# Patient Record
Sex: FEMALE | Race: BLACK OR AFRICAN AMERICAN | Employment: FULL TIME | URBAN - METROPOLITAN AREA
[De-identification: names, ages, dates, MRNs, and addresses within clinical notes are randomized per-mention and may not be internally consistent; named-entity substitution may affect disease eponyms.]

---

## 2017-02-10 ENCOUNTER — OFFICE VISIT (OUTPATIENT)
Dept: FAMILY MEDICINE CLINIC | Facility: CLINIC | Age: 42
End: 2017-02-10

## 2017-02-10 ENCOUNTER — LAB ENCOUNTER (OUTPATIENT)
Dept: LAB | Age: 42
End: 2017-02-10
Attending: PHYSICIAN ASSISTANT

## 2017-02-10 VITALS
BODY MASS INDEX: 39.27 KG/M2 | HEIGHT: 64 IN | SYSTOLIC BLOOD PRESSURE: 130 MMHG | WEIGHT: 230 LBS | HEART RATE: 90 BPM | DIASTOLIC BLOOD PRESSURE: 85 MMHG

## 2017-02-10 DIAGNOSIS — Z79.4 TYPE 2 DIABETES MELLITUS WITHOUT COMPLICATION, WITH LONG-TERM CURRENT USE OF INSULIN (HCC): ICD-10-CM

## 2017-02-10 DIAGNOSIS — N91.1 SECONDARY AMENORRHEA: Primary | ICD-10-CM

## 2017-02-10 DIAGNOSIS — E11.9 TYPE 2 DIABETES MELLITUS WITHOUT COMPLICATION, WITH LONG-TERM CURRENT USE OF INSULIN (HCC): ICD-10-CM

## 2017-02-10 DIAGNOSIS — N76.0 ACUTE VAGINITIS: ICD-10-CM

## 2017-02-10 DIAGNOSIS — N91.1 SECONDARY AMENORRHEA: ICD-10-CM

## 2017-02-10 DIAGNOSIS — M79.89 SWELLING OF BOTH HANDS: ICD-10-CM

## 2017-02-10 DIAGNOSIS — M54.50 ACUTE BILATERAL LOW BACK PAIN WITHOUT SCIATICA: ICD-10-CM

## 2017-02-10 LAB
ALBUMIN SERPL BCP-MCNC: 3.7 G/DL (ref 3.5–4.8)
ALBUMIN/GLOB SERPL: 1 {RATIO} (ref 1–2)
ALP SERPL-CCNC: 67 U/L (ref 32–100)
ALT SERPL-CCNC: 27 U/L (ref 14–54)
ANION GAP SERPL CALC-SCNC: 10 MMOL/L (ref 0–18)
APPEARANCE: CLEAR
AST SERPL-CCNC: 22 U/L (ref 15–41)
B-HCG SERPL-ACNC: 0.5 MIU/ML
BASOPHILS # BLD: 0.1 K/UL (ref 0–0.2)
BASOPHILS NFR BLD: 1 %
BILIRUB SERPL-MCNC: 0.5 MG/DL (ref 0.3–1.2)
BUN SERPL-MCNC: 10 MG/DL (ref 8–20)
BUN/CREAT SERPL: 12.3 (ref 10–20)
CALCIUM SERPL-MCNC: 9.3 MG/DL (ref 8.5–10.5)
CHLORIDE SERPL-SCNC: 104 MMOL/L (ref 95–110)
CHOLEST SERPL-MCNC: 178 MG/DL (ref 110–200)
CO2 SERPL-SCNC: 26 MMOL/L (ref 22–32)
CONTROL LINE PRESENT WITH A CLEAR BACKGROUND (YES/NO): YES YES/NO
CREAT SERPL-MCNC: 0.81 MG/DL (ref 0.5–1.5)
CRP SERPL-MCNC: 1.3 MG/DL (ref 0–0.9)
EOSINOPHIL # BLD: 0.2 K/UL (ref 0–0.7)
EOSINOPHIL NFR BLD: 3 %
ERYTHROCYTE [DISTWIDTH] IN BLOOD BY AUTOMATED COUNT: 14.7 % (ref 11–15)
ERYTHROCYTE [SEDIMENTATION RATE] IN BLOOD: 8 MM/HR (ref 0–20)
GLOBULIN PLAS-MCNC: 3.7 G/DL (ref 2.5–3.7)
GLUCOSE SERPL-MCNC: 93 MG/DL (ref 70–99)
HBA1C MFR BLD: 9.5 % (ref 4–6)
HCT VFR BLD AUTO: 44.5 % (ref 35–48)
HDLC SERPL-MCNC: 43 MG/DL
HGB BLD-MCNC: 14.6 G/DL (ref 12–16)
KIT LOT #: NORMAL NUMERIC
LDLC SERPL CALC-MCNC: 121 MG/DL (ref 0–99)
LYMPHOCYTES # BLD: 3 K/UL (ref 1–4)
LYMPHOCYTES NFR BLD: 37 %
MCH RBC QN AUTO: 26.5 PG (ref 27–32)
MCHC RBC AUTO-ENTMCNC: 32.8 G/DL (ref 32–37)
MCV RBC AUTO: 80.8 FL (ref 80–100)
MONOCYTES # BLD: 0.7 K/UL (ref 0–1)
MONOCYTES NFR BLD: 9 %
MULTISTIX LOT#: ABNORMAL NUMERIC
NEUTROPHILS # BLD AUTO: 4.2 K/UL (ref 1.8–7.7)
NEUTROPHILS NFR BLD: 51 %
NONHDLC SERPL-MCNC: 135 MG/DL
OSMOLALITY UR CALC.SUM OF ELEC: 289 MOSM/KG (ref 275–295)
PH, URINE: 6.5 (ref 4.5–8)
PLATELET # BLD AUTO: 237 K/UL (ref 140–400)
PMV BLD AUTO: 9 FL (ref 7.4–10.3)
POTASSIUM SERPL-SCNC: 3.7 MMOL/L (ref 3.3–5.1)
PREGNANCY TEST, URINE: NEGATIVE
PROT SERPL-MCNC: 7.4 G/DL (ref 5.9–8.4)
RBC # BLD AUTO: 5.51 M/UL (ref 3.7–5.4)
RHEUMATOID FACT SER QL: <5 IU/ML
SODIUM SERPL-SCNC: 140 MMOL/L (ref 136–144)
SPECIFIC GRAVITY: 1.01 (ref 1–1.03)
TRIGL SERPL-MCNC: 72 MG/DL (ref 1–149)
TSH SERPL-ACNC: 0.53 UIU/ML (ref 0.34–5.6)
UROBILINOGEN,SEMI-QN: 0.2 MG/DL (ref 0–1.9)
WBC # BLD AUTO: 8.2 K/UL (ref 4–11)

## 2017-02-10 PROCEDURE — 83036 HEMOGLOBIN GLYCOSYLATED A1C: CPT

## 2017-02-10 PROCEDURE — 80061 LIPID PANEL: CPT

## 2017-02-10 PROCEDURE — 85025 COMPLETE CBC W/AUTO DIFF WBC: CPT

## 2017-02-10 PROCEDURE — 86431 RHEUMATOID FACTOR QUANT: CPT

## 2017-02-10 PROCEDURE — 85652 RBC SED RATE AUTOMATED: CPT

## 2017-02-10 PROCEDURE — 86038 ANTINUCLEAR ANTIBODIES: CPT

## 2017-02-10 PROCEDURE — 86140 C-REACTIVE PROTEIN: CPT

## 2017-02-10 PROCEDURE — 36415 COLL VENOUS BLD VENIPUNCTURE: CPT

## 2017-02-10 PROCEDURE — 84702 CHORIONIC GONADOTROPIN TEST: CPT

## 2017-02-10 PROCEDURE — 81002 URINALYSIS NONAUTO W/O SCOPE: CPT | Performed by: PHYSICIAN ASSISTANT

## 2017-02-10 PROCEDURE — 84443 ASSAY THYROID STIM HORMONE: CPT

## 2017-02-10 PROCEDURE — 81025 URINE PREGNANCY TEST: CPT | Performed by: PHYSICIAN ASSISTANT

## 2017-02-10 PROCEDURE — 80053 COMPREHEN METABOLIC PANEL: CPT

## 2017-02-10 PROCEDURE — 86039 ANTINUCLEAR ANTIBODIES (ANA): CPT

## 2017-02-10 PROCEDURE — 99212 OFFICE O/P EST SF 10 MIN: CPT | Performed by: PHYSICIAN ASSISTANT

## 2017-02-10 PROCEDURE — 99214 OFFICE O/P EST MOD 30 MIN: CPT | Performed by: PHYSICIAN ASSISTANT

## 2017-02-10 RX ORDER — FLUCONAZOLE 150 MG/1
150 TABLET ORAL ONCE
Qty: 1 TABLET | Refills: 0 | Status: SHIPPED | OUTPATIENT
Start: 2017-02-10 | End: 2017-02-10

## 2017-02-10 NOTE — PROGRESS NOTES
HPI:    Patient ID: Gracie Sheppard is a 46year old female. HPI Comments: Patient presents for vaginal discharge and itching for past week. She denies urinary frequency or dysuria. She denies pelvic pain.   She has also been having irregular periods by mouth once. Disp: 1 tablet Rfl: 0   Insulin Pen Needle (RELION PEN NEEDLES) 32G X 4 MM Does not apply Misc Use with injections 2 times daily Disp: 200 each Rfl: 3   Insulin Syringe 31G X 5/16\" 0.5 ML Does not apply Misc by Does not apply route.  Disp: hands and feet. No sign of fungal infection. Psychiatric: She has a normal mood and affect. ASSESSMENT/PLAN:   Secondary amenorrhea  (primary encounter diagnosis)  -Urine HCG negative. Serum HCG and TSH ordered.     Acute vaginitis  -Difluc

## 2017-02-12 LAB
C TRACH DNA SPEC QL NAA+PROBE: NEGATIVE
N GONORRHOEA DNA SPEC QL NAA+PROBE: NEGATIVE
TRICHOMONAS SCREEN: NEGATIVE

## 2017-02-15 LAB — ANA NUCLEOLAR TITR SER IF: 40 {TITER}

## 2017-05-17 ENCOUNTER — PATIENT OUTREACH (OUTPATIENT)
Dept: FAMILY MEDICINE CLINIC | Facility: CLINIC | Age: 42
End: 2017-05-17

## 2017-05-17 NOTE — PROGRESS NOTES
NEEDS: HGBA1C, CMP, Lipid  DM REVIEW: HGBA1C: 2/10/2017-9.5, CMP: 2/20/2017, TSH: 2/10/2017, MICROALBUMIN:8/20/2016,  LDL: 2/10/2017-121,  EYE: 9/7/2016, FOOT: 8/22/2016   NOTES: LOV was 2/10/2017.

## 2017-09-09 ENCOUNTER — TELEPHONE (OUTPATIENT)
Dept: OTOLARYNGOLOGY | Facility: CLINIC | Age: 42
End: 2017-09-09

## 2017-09-09 DIAGNOSIS — E04.1 THYROID NODULE: Primary | ICD-10-CM

## 2017-10-13 ENCOUNTER — TELEPHONE (OUTPATIENT)
Dept: OTOLARYNGOLOGY | Facility: CLINIC | Age: 42
End: 2017-10-13

## 2018-03-13 ENCOUNTER — OFFICE VISIT (OUTPATIENT)
Dept: FAMILY MEDICINE CLINIC | Facility: CLINIC | Age: 43
End: 2018-03-13

## 2018-03-13 ENCOUNTER — LAB ENCOUNTER (OUTPATIENT)
Dept: LAB | Age: 43
End: 2018-03-13
Attending: FAMILY MEDICINE
Payer: COMMERCIAL

## 2018-03-13 VITALS
BODY MASS INDEX: 42.34 KG/M2 | WEIGHT: 248 LBS | HEIGHT: 64 IN | SYSTOLIC BLOOD PRESSURE: 122 MMHG | DIASTOLIC BLOOD PRESSURE: 87 MMHG | TEMPERATURE: 99 F | HEART RATE: 99 BPM

## 2018-03-13 DIAGNOSIS — E78.00 HYPERCHOLESTEREMIA: ICD-10-CM

## 2018-03-13 DIAGNOSIS — Z12.31 ENCOUNTER FOR SCREENING MAMMOGRAM FOR BREAST CANCER: ICD-10-CM

## 2018-03-13 DIAGNOSIS — Z01.419 ENCOUNTER FOR GYNECOLOGICAL EXAMINATION: ICD-10-CM

## 2018-03-13 DIAGNOSIS — E66.01 MORBID OBESITY WITH BMI OF 40.0-44.9, ADULT (HCC): ICD-10-CM

## 2018-03-13 DIAGNOSIS — Z11.3 SCREENING EXAMINATION FOR STD (SEXUALLY TRANSMITTED DISEASE): ICD-10-CM

## 2018-03-13 DIAGNOSIS — Z00.00 WELL ADULT EXAM: Primary | ICD-10-CM

## 2018-03-13 DIAGNOSIS — IMO0001 UNCONTROLLED TYPE 2 DIABETES MELLITUS WITHOUT COMPLICATION, WITH LONG-TERM CURRENT USE OF INSULIN: ICD-10-CM

## 2018-03-13 DIAGNOSIS — Z00.00 WELL ADULT EXAM: ICD-10-CM

## 2018-03-13 DIAGNOSIS — E04.2 MULTIPLE THYROID NODULES: ICD-10-CM

## 2018-03-13 PROBLEM — N76.0 ACUTE VAGINITIS: Status: RESOLVED | Noted: 2017-02-10 | Resolved: 2018-03-13

## 2018-03-13 PROBLEM — E11.9 TYPE 2 DIABETES MELLITUS WITHOUT COMPLICATION, WITH LONG-TERM CURRENT USE OF INSULIN (HCC): Status: RESOLVED | Noted: 2017-02-10 | Resolved: 2018-03-13

## 2018-03-13 PROBLEM — Z79.4 TYPE 2 DIABETES MELLITUS WITHOUT COMPLICATION, WITH LONG-TERM CURRENT USE OF INSULIN (HCC): Status: RESOLVED | Noted: 2017-02-10 | Resolved: 2018-03-13

## 2018-03-13 PROBLEM — M54.50 ACUTE BILATERAL LOW BACK PAIN WITHOUT SCIATICA: Status: RESOLVED | Noted: 2017-02-10 | Resolved: 2018-03-13

## 2018-03-13 LAB
ALT SERPL-CCNC: 36 U/L (ref 14–54)
ANION GAP SERPL CALC-SCNC: 7 MMOL/L (ref 0–18)
AST SERPL-CCNC: 28 U/L (ref 15–41)
BASOPHILS # BLD: 0 K/UL (ref 0–0.2)
BASOPHILS NFR BLD: 1 %
BUN SERPL-MCNC: 11 MG/DL (ref 8–20)
BUN/CREAT SERPL: 14.7 (ref 10–20)
CALCIUM SERPL-MCNC: 9.2 MG/DL (ref 8.5–10.5)
CHLORIDE SERPL-SCNC: 102 MMOL/L (ref 95–110)
CHOLEST SERPL-MCNC: 188 MG/DL (ref 110–200)
CO2 SERPL-SCNC: 28 MMOL/L (ref 22–32)
CREAT SERPL-MCNC: 0.75 MG/DL (ref 0.5–1.5)
CREAT UR-MCNC: 279.5 MG/DL
EOSINOPHIL # BLD: 0.1 K/UL (ref 0–0.7)
EOSINOPHIL NFR BLD: 2 %
ERYTHROCYTE [DISTWIDTH] IN BLOOD BY AUTOMATED COUNT: 14.5 % (ref 11–15)
GLUCOSE SERPL-MCNC: 238 MG/DL (ref 70–99)
HBA1C MFR BLD: 11 % (ref 4–6)
HCT VFR BLD AUTO: 42.4 % (ref 35–48)
HDLC SERPL-MCNC: 46 MG/DL
HGB BLD-MCNC: 14.5 G/DL (ref 12–16)
LDLC SERPL CALC-MCNC: 128 MG/DL (ref 0–99)
LYMPHOCYTES # BLD: 3.3 K/UL (ref 1–4)
LYMPHOCYTES NFR BLD: 39 %
MCH RBC QN AUTO: 27.2 PG (ref 27–32)
MCHC RBC AUTO-ENTMCNC: 34.1 G/DL (ref 32–37)
MCV RBC AUTO: 79.6 FL (ref 80–100)
MICROALBUMIN UR-MCNC: 5.2 MG/DL (ref 0–1.8)
MICROALBUMIN/CREAT UR: 18.6 MG/G{CREAT} (ref 0–20)
MONOCYTES # BLD: 0.7 K/UL (ref 0–1)
MONOCYTES NFR BLD: 8 %
NEUTROPHILS # BLD AUTO: 4.3 K/UL (ref 1.8–7.7)
NEUTROPHILS NFR BLD: 51 %
NONHDLC SERPL-MCNC: 142 MG/DL
OSMOLALITY UR CALC.SUM OF ELEC: 291 MOSM/KG (ref 275–295)
PLATELET # BLD AUTO: 427 K/UL (ref 140–400)
PMV BLD AUTO: 8.5 FL (ref 7.4–10.3)
POTASSIUM SERPL-SCNC: 3.8 MMOL/L (ref 3.3–5.1)
RBC # BLD AUTO: 5.32 M/UL (ref 3.7–5.4)
SODIUM SERPL-SCNC: 137 MMOL/L (ref 136–144)
TRIGL SERPL-MCNC: 72 MG/DL (ref 1–149)
TSH SERPL-ACNC: 0.89 UIU/ML (ref 0.45–5.33)
WBC # BLD AUTO: 8.4 K/UL (ref 4–11)

## 2018-03-13 PROCEDURE — 80061 LIPID PANEL: CPT

## 2018-03-13 PROCEDURE — 83036 HEMOGLOBIN GLYCOSYLATED A1C: CPT

## 2018-03-13 PROCEDURE — 99396 PREV VISIT EST AGE 40-64: CPT | Performed by: FAMILY MEDICINE

## 2018-03-13 PROCEDURE — 82570 ASSAY OF URINE CREATININE: CPT

## 2018-03-13 PROCEDURE — 80048 BASIC METABOLIC PNL TOTAL CA: CPT

## 2018-03-13 PROCEDURE — 84443 ASSAY THYROID STIM HORMONE: CPT

## 2018-03-13 PROCEDURE — 82306 VITAMIN D 25 HYDROXY: CPT

## 2018-03-13 PROCEDURE — 85025 COMPLETE CBC W/AUTO DIFF WBC: CPT

## 2018-03-13 PROCEDURE — 84450 TRANSFERASE (AST) (SGOT): CPT

## 2018-03-13 PROCEDURE — 82043 UR ALBUMIN QUANTITATIVE: CPT

## 2018-03-13 PROCEDURE — 36415 COLL VENOUS BLD VENIPUNCTURE: CPT

## 2018-03-13 PROCEDURE — 84460 ALANINE AMINO (ALT) (SGPT): CPT

## 2018-03-13 RX ORDER — PENICILLIN V POTASSIUM 500 MG/1
500 TABLET ORAL
Refills: 0 | COMMUNITY
Start: 2018-03-07 | End: 2018-06-08

## 2018-03-13 NOTE — PROGRESS NOTES
HPI:   Salas Rutherford is a 38year old female who presents for a complete physical exam. Symptoms: periods are regular.      Wt Readings from Last 6 Encounters:  03/13/18 : 248 lb (112.5 kg)  02/10/17 : 230 lb (104.3 kg)  09/16/16 : 230 lb (104.3 kg)  09 Insulin Syringe 30G X 5/16\" 1 ML Does not apply Misc Use with insulin 4 times per day Disp: 150 each Rfl: 0      Past Medical History:   Diagnosis Date   • Diabetes mellitus ([facility] no BDR  7/24/2014   • Diabetic retinopathy ([facility] 2008   • Myopia of both denies dysuria, vaginal discharge or itching,periods regular   MUSCULOSKELETAL: denies back pain  NEURO: denies headaches  PSYCHE: denies depression or anxiety  HEMATOLOGIC: denies hx of anemia  ENDOCRINE: denies thyroid history  ALL/ASTHMA: denies hx of a shot  Tetanus booster every 10 years    - ALT (SGPT); Future  - AST (SGOT); Future  - BASIC METABOLIC PANEL (8); Future  - CBC WITH DIFFERENTIAL WITH PLATELET; Future  - LIPID PANEL;  Future  - TSH W REFLEX TO FREE T4; Future  - VITAMIN D, 25-HYDROXY; Futur

## 2018-03-14 LAB
25(OH)D3 SERPL-MCNC: 15.3 NG/ML
C TRACH DNA SPEC QL NAA+PROBE: NEGATIVE
N GONORRHOEA DNA SPEC QL NAA+PROBE: NEGATIVE

## 2018-03-16 ENCOUNTER — PATIENT MESSAGE (OUTPATIENT)
Dept: ENDOCRINOLOGY CLINIC | Facility: CLINIC | Age: 43
End: 2018-03-16

## 2018-03-16 ENCOUNTER — PATIENT MESSAGE (OUTPATIENT)
Dept: FAMILY MEDICINE CLINIC | Facility: CLINIC | Age: 43
End: 2018-03-16

## 2018-03-16 ENCOUNTER — TELEPHONE (OUTPATIENT)
Dept: ENDOCRINOLOGY CLINIC | Facility: CLINIC | Age: 43
End: 2018-03-16

## 2018-03-16 ENCOUNTER — TELEPHONE (OUTPATIENT)
Dept: OTHER | Age: 43
End: 2018-03-16

## 2018-03-16 DIAGNOSIS — N92.6 LATE MENSES: Primary | ICD-10-CM

## 2018-03-16 LAB
HPV I/H RISK 1 DNA SPEC QL NAA+PROBE: NEGATIVE
LAST PAP RESULT: NORMAL

## 2018-03-16 NOTE — TELEPHONE ENCOUNTER
KASIATCDEAN and will also send email. Currently there is cancellation spot at 1:15 on Monday or 3pm MD approval. Will email patient as well and offer either one.

## 2018-03-16 NOTE — TELEPHONE ENCOUNTER
Pt sent message via My Chart scheduling request.  Pt was offered first available appt on 5/4/18 but states:    \"I need an appointment much sooner with Dr Ray Santoyo or the other Endocrinologist in the office my Dr wants me to see someone asap since my diabetes

## 2018-03-16 NOTE — TELEPHONE ENCOUNTER
No recent pregnancy test on file. Message was routed to provider to confirm if order can be placed.     Please reply to pool: JOSE ENRIQUE Connelly

## 2018-03-16 NOTE — TELEPHONE ENCOUNTER
From: Edgardo Rodas  To: Paige Alcantara MD  Sent: 3/16/2018 7:52 AM CDT  Subject: Test Results Question    I was wondering if the pregnancy test was done if so what are the results.     I can't get the mammogram next week without the results of the pregn

## 2018-03-17 NOTE — TELEPHONE ENCOUNTER
Pt informed via [employer] regarding urine pregnancy test order. Pt to call back if se has any questions.

## 2018-03-17 NOTE — TELEPHONE ENCOUNTER
Amie Rodarte MD Physician Signed  Creation Time: 03/17/2018 10:25 AM         No pregnancy test was done. I can order this.   Urine hCG ordered.

## 2018-03-19 ENCOUNTER — TELEPHONE (OUTPATIENT)
Dept: ENDOCRINOLOGY CLINIC | Facility: CLINIC | Age: 43
End: 2018-03-19

## 2018-03-19 ENCOUNTER — TELEPHONE (OUTPATIENT)
Dept: FAMILY MEDICINE CLINIC | Facility: CLINIC | Age: 43
End: 2018-03-19

## 2018-03-19 ENCOUNTER — OFFICE VISIT (OUTPATIENT)
Dept: ENDOCRINOLOGY CLINIC | Facility: CLINIC | Age: 43
End: 2018-03-19

## 2018-03-19 VITALS
SYSTOLIC BLOOD PRESSURE: 137 MMHG | DIASTOLIC BLOOD PRESSURE: 84 MMHG | WEIGHT: 250 LBS | HEART RATE: 97 BPM | HEIGHT: 64 IN | BODY MASS INDEX: 42.68 KG/M2

## 2018-03-19 DIAGNOSIS — E11.65 UNCONTROLLED TYPE 2 DIABETES MELLITUS WITH HYPERGLYCEMIA, WITH LONG-TERM CURRENT USE OF INSULIN (HCC): Primary | ICD-10-CM

## 2018-03-19 DIAGNOSIS — Z79.4 UNCONTROLLED TYPE 2 DIABETES MELLITUS WITH HYPERGLYCEMIA, WITH LONG-TERM CURRENT USE OF INSULIN (HCC): Primary | ICD-10-CM

## 2018-03-19 LAB
GLUCOSE BLOOD: 280
TEST STRIP LOT #: NORMAL NUMERIC

## 2018-03-19 PROCEDURE — 99212 OFFICE O/P EST SF 10 MIN: CPT | Performed by: INTERNAL MEDICINE

## 2018-03-19 PROCEDURE — 82962 GLUCOSE BLOOD TEST: CPT | Performed by: INTERNAL MEDICINE

## 2018-03-19 PROCEDURE — 36416 COLLJ CAPILLARY BLOOD SPEC: CPT | Performed by: INTERNAL MEDICINE

## 2018-03-19 PROCEDURE — 99214 OFFICE O/P EST MOD 30 MIN: CPT | Performed by: INTERNAL MEDICINE

## 2018-03-19 RX ORDER — GLIMEPIRIDE 4 MG/1
4 TABLET ORAL
Qty: 30 TABLET | Refills: 6 | Status: SHIPPED | OUTPATIENT
Start: 2018-03-19 | End: 2018-06-29

## 2018-03-19 RX ORDER — METFORMIN HYDROCHLORIDE 500 MG/1
500 TABLET, EXTENDED RELEASE ORAL 2 TIMES DAILY WITH MEALS
Qty: 60 TABLET | Refills: 6 | Status: SHIPPED | OUTPATIENT
Start: 2018-03-19 | End: 2018-06-08

## 2018-03-19 NOTE — PROGRESS NOTES
Name: Rajni Humphrey  Date: 3/19/2018    Referring Physician: No ref. provider found    HISTORY OF PRESENT ILLNESS   Rajni Humphrey is a 38year old female who presents for diabetes mellitus, diagnosed in 2008.   She has been lost to follow up for ove hours. (Patient taking differently: Inject 42 Units into the skin every 12 (twelve) hours.  Taking 75 units in the morning, 80 units in the evening ), Disp: 30 mL, Rfl: 3  •  Glucose Blood (CLAIRE CONTOUR NEXT TEST) In Vitro Strip, Check 2 times daily, Disp: conjunctivae, sclera. , normal sclera and normal pupils  Ears/Nose/Mouth/Throat/Neck:  no palpable thyroid nodules or cervical lymphadenopathy, diffusely enlarged thyroid  Back: no kyphosis or back tenderness  Respiratory:  clear to auscultation bilaterally

## 2018-03-19 NOTE — TELEPHONE ENCOUNTER
Informed patient that Dr Micheal Ortiz has ordered urine pregnancy test, lab in [facility] is open [id number] M-F, [place] [address]

## 2018-03-19 NOTE — PATIENT INSTRUCTIONS
Start Tresiba 80 units SQ daily    Start Glimepiride 4mg daily    Start Metformin ER 500mg 2 times per day    Start Victoza 0.6mg SQ daily for one week then increase to 1.2mg SQ daily for one week then increase to 1.8mg SQ daily

## 2018-03-20 ENCOUNTER — TELEPHONE (OUTPATIENT)
Dept: OTHER | Age: 43
End: 2018-03-20

## 2018-03-20 ENCOUNTER — APPOINTMENT (OUTPATIENT)
Dept: LAB | Age: 43
End: 2018-03-20
Attending: FAMILY MEDICINE
Payer: COMMERCIAL

## 2018-03-20 DIAGNOSIS — N92.6 LATE MENSES: ICD-10-CM

## 2018-03-20 LAB — B-HCG UR QL: NEGATIVE

## 2018-03-20 PROCEDURE — 81025 URINE PREGNANCY TEST: CPT

## 2018-03-21 ENCOUNTER — HOSPITAL ENCOUNTER (OUTPATIENT)
Dept: ULTRASOUND IMAGING | Age: 43
Discharge: HOME | End: 2018-03-21
Attending: FAMILY MEDICINE
Payer: COMMERCIAL

## 2018-03-21 ENCOUNTER — HOSPITAL ENCOUNTER (OUTPATIENT)
Dept: MAMMOGRAPHY | Age: 43
Discharge: HOME | End: 2018-03-21
Attending: FAMILY MEDICINE
Payer: COMMERCIAL

## 2018-03-21 ENCOUNTER — TELEPHONE (OUTPATIENT)
Dept: FAMILY MEDICINE CLINIC | Facility: CLINIC | Age: 43
End: 2018-03-21

## 2018-03-21 DIAGNOSIS — Z12.31 ENCOUNTER FOR SCREENING MAMMOGRAM FOR BREAST CANCER: ICD-10-CM

## 2018-03-21 DIAGNOSIS — E04.2 MULTIPLE THYROID NODULES: ICD-10-CM

## 2018-03-21 PROCEDURE — 77067 SCR MAMMO BI INCL CAD: CPT | Performed by: FAMILY MEDICINE

## 2018-03-21 PROCEDURE — 76536 US EXAM OF HEAD AND NECK: CPT | Performed by: FAMILY MEDICINE

## 2018-03-22 NOTE — TELEPHONE ENCOUNTER
Victoza approved through 3/20/2019. Approved or up to 9ml/month. Case number [id number]. Pharmacy made aware.

## 2018-03-23 PROBLEM — R92.2 DENSE BREAST TISSUE ON MAMMOGRAM: Status: ACTIVE | Noted: 2018-03-23

## 2018-03-23 PROBLEM — E55.9 VITAMIN D DEFICIENCY: Status: ACTIVE | Noted: 2018-03-23

## 2018-03-23 PROBLEM — R92.30 DENSE BREAST TISSUE ON MAMMOGRAM: Status: ACTIVE | Noted: 2018-03-23

## 2018-04-27 ENCOUNTER — OFFICE VISIT (OUTPATIENT)
Dept: ENDOCRINOLOGY CLINIC | Facility: CLINIC | Age: 43
End: 2018-04-27

## 2018-04-27 VITALS
DIASTOLIC BLOOD PRESSURE: 86 MMHG | WEIGHT: 251 LBS | HEART RATE: 102 BPM | SYSTOLIC BLOOD PRESSURE: 131 MMHG | HEIGHT: 64 IN | BODY MASS INDEX: 42.85 KG/M2

## 2018-04-27 DIAGNOSIS — E11.65 UNCONTROLLED TYPE 2 DIABETES MELLITUS WITH HYPERGLYCEMIA, WITH LONG-TERM CURRENT USE OF INSULIN (HCC): Primary | ICD-10-CM

## 2018-04-27 DIAGNOSIS — Z79.4 UNCONTROLLED TYPE 2 DIABETES MELLITUS WITH HYPERGLYCEMIA, WITH LONG-TERM CURRENT USE OF INSULIN (HCC): Primary | ICD-10-CM

## 2018-04-27 PROCEDURE — 99213 OFFICE O/P EST LOW 20 MIN: CPT | Performed by: INTERNAL MEDICINE

## 2018-04-27 PROCEDURE — 82962 GLUCOSE BLOOD TEST: CPT | Performed by: INTERNAL MEDICINE

## 2018-04-27 PROCEDURE — 99212 OFFICE O/P EST SF 10 MIN: CPT | Performed by: INTERNAL MEDICINE

## 2018-04-27 PROCEDURE — 36416 COLLJ CAPILLARY BLOOD SPEC: CPT | Performed by: INTERNAL MEDICINE

## 2018-04-27 PROCEDURE — 83036 HEMOGLOBIN GLYCOSYLATED A1C: CPT | Performed by: INTERNAL MEDICINE

## 2018-04-27 NOTE — PATIENT INSTRUCTIONS
Stop Metformin     Start Tresiba 80 units SQ bedtime    Start Victoza 0.6mg SQ daily for one week then increase to 1.2mg SQ daily     Continue Glimepiride

## 2018-04-27 NOTE — PROGRESS NOTES
Name: Pat Gutiérrez  Date: 4/27/2018    Referring Physician: No ref. provider found    HISTORY OF PRESENT ILLNESS   Pat Gutiérrez is a 38year old female who presents for diabetes mellitus, diagnosed in 2008.   She has been lost to follow up for ove each, Rfl: 3  •  Insulin Syringe 31G X 5/16\" 0.5 ML Does not apply Misc, by Does not apply route., Disp: , Rfl:   •  Insulin NPH Isophane & Regular (NOVOLIN 70/30 RELION) (70-30) 100 UNIT/ML Subcutaneous Suspension, Inject 42 Units into the skin every 12 Alcohol use: No              Drug use: No            Other Topics            Concern  Caffeine Concern        Yes    Comment:tea; 2 cups a week.         Medical History:   Past Medical History:   Diagnosis Date   • Diabetes mellitus ([facility] no BDR  7/24/2014 transition to basal and GLP-1 therapy   -Start Tresiba 80 units SQ QHS   -Start Victoza 1.8mg SQ daily  -Start Glimepiride 4mg PO daily  -No Metformin therapy given significant GI symptoms  -Provided coupon cards to help with cost and she will call if unab

## 2018-05-24 ENCOUNTER — APPOINTMENT (OUTPATIENT)
Dept: LAB | Age: 43
End: 2018-05-24
Attending: FAMILY MEDICINE
Payer: COMMERCIAL

## 2018-05-24 ENCOUNTER — OFFICE VISIT (OUTPATIENT)
Dept: FAMILY MEDICINE CLINIC | Facility: CLINIC | Age: 43
End: 2018-05-24

## 2018-05-24 VITALS
TEMPERATURE: 98 F | HEIGHT: 64 IN | HEART RATE: 90 BPM | DIASTOLIC BLOOD PRESSURE: 82 MMHG | BODY MASS INDEX: 42.34 KG/M2 | SYSTOLIC BLOOD PRESSURE: 132 MMHG | WEIGHT: 248 LBS

## 2018-05-24 DIAGNOSIS — Z11.1 TUBERCULOSIS SCREENING: ICD-10-CM

## 2018-05-24 DIAGNOSIS — Z01.84 IMMUNITY STATUS TESTING: ICD-10-CM

## 2018-05-24 DIAGNOSIS — Z02.1 DRUG SCREENING, PRE-EMPLOYMENT: ICD-10-CM

## 2018-05-24 DIAGNOSIS — Z02.1 PRE-EMPLOYMENT EXAMINATION: Primary | ICD-10-CM

## 2018-05-24 PROCEDURE — 99212 OFFICE O/P EST SF 10 MIN: CPT | Performed by: FAMILY MEDICINE

## 2018-05-24 PROCEDURE — 86580 TB INTRADERMAL TEST: CPT | Performed by: FAMILY MEDICINE

## 2018-05-24 PROCEDURE — 36415 COLL VENOUS BLD VENIPUNCTURE: CPT

## 2018-05-24 PROCEDURE — 86706 HEP B SURFACE ANTIBODY: CPT

## 2018-05-24 PROCEDURE — 99214 OFFICE O/P EST MOD 30 MIN: CPT | Performed by: FAMILY MEDICINE

## 2018-05-24 PROCEDURE — 86765 RUBEOLA ANTIBODY: CPT

## 2018-05-24 PROCEDURE — 86762 RUBELLA ANTIBODY: CPT

## 2018-05-24 PROCEDURE — 86787 VARICELLA-ZOSTER ANTIBODY: CPT

## 2018-05-24 PROCEDURE — 86735 MUMPS ANTIBODY: CPT

## 2018-05-24 PROCEDURE — 80307 DRUG TEST PRSMV CHEM ANLYZR: CPT

## 2018-05-24 NOTE — PROGRESS NOTES
HPI:   Sin Choi is a 38year old female who presents for a complete preemployment  exam.    Patient has brought in forms that she needs completed. Patient is currently doing masters in public health.   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 Disp: 30 tablet Rfl: 6   Insulin Pen Needle (RELION PEN NEEDLES) 32G X 4 MM Does not apply Misc Use with injections 2 times daily Disp: 200 each Administered Date(s) Administered   • Influenza Vaccine, No Preserv, 3YR + 10/09/2014   • Pneumovax 23 07/18/2016   • TDAP 07/18/2016   • Tb Intradermal Test 05/24/2018         Diabetes Care Foot Exam due on 08/22/2017  Diabetes Care A1C due on 07/27/201 SCREEN DONE ONLINE IN OFFICE WAS NORMAL    ASSESSMENT AND PLAN:   Sin Choi is a 38year old female who presents for a complete physical exam.Health maintenance, will check fasting Lipids, CMP, TSH and CBC.  Pt info handouts given for: exercise, low

## 2018-05-26 ENCOUNTER — NURSE ONLY (OUTPATIENT)
Dept: FAMILY MEDICINE CLINIC | Facility: CLINIC | Age: 43
End: 2018-05-26

## 2018-05-26 DIAGNOSIS — Z11.1 PPD SCREENING TEST: Primary | ICD-10-CM

## 2018-05-26 NOTE — PROGRESS NOTES
Patient is here for her tb read. Verified name, and medication.  Patients tb test was normal 0mm induration

## 2018-06-02 ENCOUNTER — NURSE ONLY (OUTPATIENT)
Dept: FAMILY MEDICINE CLINIC | Facility: CLINIC | Age: 43
End: 2018-06-02

## 2018-06-02 DIAGNOSIS — Z11.1 ENCOUNTER FOR PPD TEST: Primary | ICD-10-CM

## 2018-06-02 PROCEDURE — 90707 MMR VACCINE SC: CPT | Performed by: FAMILY MEDICINE

## 2018-06-02 PROCEDURE — 86580 TB INTRADERMAL TEST: CPT | Performed by: FAMILY MEDICINE

## 2018-06-02 PROCEDURE — 90471 IMMUNIZATION ADMIN: CPT | Performed by: FAMILY MEDICINE

## 2018-06-02 NOTE — PROGRESS NOTES
Pt in for nurse visit in for TB placement and MMR booster. Pt verified name and . Administered PPD on Right forearm today, tolerated injection n/c n/r. Gelacio Roughen Pt advised to return in 48-72 hrs. Pt advised not to scratch or place bandage on injection site.

## 2018-06-04 ENCOUNTER — NURSE ONLY (OUTPATIENT)
Dept: FAMILY MEDICINE CLINIC | Facility: CLINIC | Age: 43
End: 2018-06-04

## 2018-06-04 DIAGNOSIS — Z11.1 PPD SCREENING TEST: Primary | ICD-10-CM

## 2018-06-07 ENCOUNTER — TELEPHONE (OUTPATIENT)
Dept: FAMILY MEDICINE CLINIC | Facility: CLINIC | Age: 43
End: 2018-06-07

## 2018-06-07 NOTE — TELEPHONE ENCOUNTER
Pt is requesting results on color vision and a drug screening test. Pt states she also has questions on TB test results.      Pt also requesting order for alcohol test.

## 2018-06-08 ENCOUNTER — APPOINTMENT (OUTPATIENT)
Dept: LAB | Age: 43
End: 2018-06-08
Attending: INTERNAL MEDICINE
Payer: COMMERCIAL

## 2018-06-08 ENCOUNTER — OFFICE VISIT (OUTPATIENT)
Dept: INTERNAL MEDICINE CLINIC | Facility: CLINIC | Age: 43
End: 2018-06-08

## 2018-06-08 VITALS
DIASTOLIC BLOOD PRESSURE: 86 MMHG | WEIGHT: 248 LBS | BODY MASS INDEX: 42.34 KG/M2 | HEART RATE: 88 BPM | SYSTOLIC BLOOD PRESSURE: 140 MMHG | HEIGHT: 64 IN

## 2018-06-08 DIAGNOSIS — J30.2 SEASONAL ALLERGIC RHINITIS, UNSPECIFIED TRIGGER: ICD-10-CM

## 2018-06-08 DIAGNOSIS — G47.33 OSA (OBSTRUCTIVE SLEEP APNEA): ICD-10-CM

## 2018-06-08 DIAGNOSIS — E11.9 DIABETES MELLITUS TYPE 2 WITHOUT RETINOPATHY (HCC): ICD-10-CM

## 2018-06-08 DIAGNOSIS — Z02.0 SCHOOL PHYSICAL EXAM: ICD-10-CM

## 2018-06-08 DIAGNOSIS — Z02.0 SCHOOL PHYSICAL EXAM: Primary | ICD-10-CM

## 2018-06-08 DIAGNOSIS — H66.90 ACUTE OTITIS MEDIA, UNSPECIFIED OTITIS MEDIA TYPE: ICD-10-CM

## 2018-06-08 PROCEDURE — 80320 DRUG SCREEN QUANTALCOHOLS: CPT

## 2018-06-08 PROCEDURE — 36415 COLL VENOUS BLD VENIPUNCTURE: CPT

## 2018-06-08 PROCEDURE — 99213 OFFICE O/P EST LOW 20 MIN: CPT | Performed by: INTERNAL MEDICINE

## 2018-06-08 PROCEDURE — 99212 OFFICE O/P EST SF 10 MIN: CPT | Performed by: INTERNAL MEDICINE

## 2018-06-08 RX ORDER — AMOXICILLIN 875 MG/1
875 TABLET, COATED ORAL 2 TIMES DAILY
Qty: 10 TABLET | Refills: 0 | Status: SHIPPED | OUTPATIENT
Start: 2018-06-08 | End: 2018-06-13

## 2018-06-08 NOTE — PROGRESS NOTES
Isra Smith is a 38year old female.   Patient presents with:  Test Results: alcohol screen      HPI:   Pt comes for a f/u  c/c nasal congestion and needs forms filled   c/o nasal congestion x one week   PND when lying down and sneezing a lot   Ros as without mention of complication, not stated as uncontrolled       Past Surgical History:   Procedure Laterality Date   •       3      Social History:  Smoking status: Never Smoker                                                              Smokeless t (obstructive sleep apnea)   She does state that she has some difficulty swallowing certain foods and noted that her tonsils are enlarged   She does have sleep apnea but she is not on a CPAP machine   She does snore she does wake up short of breath from her

## 2018-06-08 NOTE — PATIENT INSTRUCTIONS
School physical exam  -     ETHYL ALCOHOL;  Future   Will check   Diabetes mellitus type 2 without retinopathy ([facility]   Noted that her A1c was 10.2 and April 2018 and she sees Dr. Le Snyder, advised her to continue checking blood sugars  Seasonal allergic rhinit Ask your provider for advice on how to avoid substances that you are allergic to. Below are a few tips for each type of allergen. Pet dander:  · Do not have pets with fur and feathers.   · If you can't avoid having a pet, keep it out of your bedroom and of © 3583-0014 [address] [facility] [place]. All rights reserved. This information is not intended as a substitute for professional medical care. Always follow your healthcare professional's instructions.

## 2018-06-08 NOTE — TELEPHONE ENCOUNTER
2 step TB test was done and neg  Drug screen was done and was neg  Not sure if we do alcohol test here, like a serum alcohol level?

## 2018-06-09 NOTE — TELEPHONE ENCOUNTER
Alcohol test was negative–if she needs any paperwork that needs to be filled/signed please ask her to bring it in, otherwise a copy of this can be left in the office for her to  her to be mailed   please call patient and ask her how she would like t

## 2018-06-11 ENCOUNTER — TELEPHONE (OUTPATIENT)
Dept: OTHER | Age: 43
End: 2018-06-11

## 2018-06-11 NOTE — TELEPHONE ENCOUNTER
Result   ETHYL ALCOHOL (Order [id number])   ETHYL ALCOHOL   Order: [id number]   Collected:  6/8/2018 12:59 PM Status:  Final result Dx:  School physical exam   Notes recorded by Jc You MD on 6/9/2018 at 1:38 PM CDT  Results reviewed and will be sent

## 2018-06-12 NOTE — TELEPHONE ENCOUNTER
Called patient - verified patient's name and  - informed pt of doctor's note - patient states she already received test results thru [facility]

## 2018-06-20 ENCOUNTER — OFFICE VISIT (OUTPATIENT)
Dept: OPHTHALMOLOGY | Facility: CLINIC | Age: 43
End: 2018-06-20

## 2018-06-20 DIAGNOSIS — E10.9 TYPE 1 DIABETES MELLITUS WITHOUT RETINOPATHY (HCC): Primary | ICD-10-CM

## 2018-06-20 PROCEDURE — 92014 COMPRE OPH EXAM EST PT 1/>: CPT | Performed by: OPHTHALMOLOGY

## 2018-06-20 NOTE — PATIENT INSTRUCTIONS
Type 1 diabetes mellitus without retinopathy ([facility]  Diabetes type I: no background retinopathy, no signs of neovascularization noted. Discussed ocular and systemic benefits of blood sugar control.

## 2018-06-20 NOTE — PROGRESS NOTES
Catherine Agustin is a 38year old female.     HPI:     HPI     Diabetic Eye Exam    Additional comments: Pt has been a diabetic for 10 years  3 month on pills/ 10 years on Insulin (taking insulin only)  Pt checks her BS 4 times a day   Pt's last blood suga Inject 2 times daily Disp: 100 each Rfl: 3   glimepiride 4 MG Oral Tab Take 1 tablet (4 mg total) by mouth every morning before breakfast. Disp: 30 tablet Rfl: 6   Insulin Pen Needle (RELION PEN NEEDLES) 32G X 4 MM Does not apply Misc Use with injections 2 glasses updated                 ASSESSMENT/PLAN:     Diagnoses and Plan:     Type 1 diabetes mellitus without retinopathy ([facility]  Diabetes type I: no background retinopathy, no signs of neovascularization noted.   Discussed ocular and systemic benefits of bl

## 2018-06-20 NOTE — ASSESSMENT & PLAN NOTE
Diabetes type I: no background retinopathy, no signs of neovascularization noted. Discussed ocular and systemic benefits of blood sugar control. 1 area of cotton wool spot observed in the left eye today. Will follow yearly.    Patient and her endocrinolo

## 2018-06-21 ENCOUNTER — HOSPITAL ENCOUNTER (EMERGENCY)
Facility: HOSPITAL | Age: 43
Discharge: HOME | End: 2018-06-21
Attending: EMERGENCY MEDICINE
Payer: COMMERCIAL

## 2018-06-21 ENCOUNTER — APPOINTMENT (OUTPATIENT)
Dept: CT IMAGING | Facility: HOSPITAL | Age: 43
End: 2018-06-21
Attending: EMERGENCY MEDICINE
Payer: COMMERCIAL

## 2018-06-21 VITALS
WEIGHT: 248 LBS | HEART RATE: 90 BPM | TEMPERATURE: 100 F | HEIGHT: 64 IN | OXYGEN SATURATION: 96 % | SYSTOLIC BLOOD PRESSURE: 133 MMHG | RESPIRATION RATE: 18 BRPM | DIASTOLIC BLOOD PRESSURE: 93 MMHG | BODY MASS INDEX: 42.34 KG/M2

## 2018-06-21 DIAGNOSIS — R51.9 NONINTRACTABLE HEADACHE, UNSPECIFIED CHRONICITY PATTERN, UNSPECIFIED HEADACHE TYPE: Primary | ICD-10-CM

## 2018-06-21 PROCEDURE — 70450 CT HEAD/BRAIN W/O DYE: CPT | Performed by: EMERGENCY MEDICINE

## 2018-06-21 PROCEDURE — 99284 EMERGENCY DEPT VISIT MOD MDM: CPT

## 2018-06-21 PROCEDURE — 96375 TX/PRO/DX INJ NEW DRUG ADDON: CPT

## 2018-06-21 PROCEDURE — 96361 HYDRATE IV INFUSION ADD-ON: CPT

## 2018-06-21 PROCEDURE — 80048 BASIC METABOLIC PNL TOTAL CA: CPT | Performed by: EMERGENCY MEDICINE

## 2018-06-21 PROCEDURE — 81025 URINE PREGNANCY TEST: CPT

## 2018-06-21 PROCEDURE — 96374 THER/PROPH/DIAG INJ IV PUSH: CPT

## 2018-06-21 PROCEDURE — 85025 COMPLETE CBC W/AUTO DIFF WBC: CPT | Performed by: EMERGENCY MEDICINE

## 2018-06-21 PROCEDURE — 82962 GLUCOSE BLOOD TEST: CPT

## 2018-06-21 RX ORDER — DIPHENHYDRAMINE HYDROCHLORIDE 50 MG/ML
25 INJECTION INTRAMUSCULAR; INTRAVENOUS ONCE
Status: COMPLETED | OUTPATIENT
Start: 2018-06-21 | End: 2018-06-21

## 2018-06-21 RX ORDER — METOCLOPRAMIDE HYDROCHLORIDE 5 MG/ML
10 INJECTION INTRAMUSCULAR; INTRAVENOUS ONCE
Status: COMPLETED | OUTPATIENT
Start: 2018-06-21 | End: 2018-06-21

## 2018-06-21 RX ORDER — DEXAMETHASONE SODIUM PHOSPHATE 4 MG/ML
10 VIAL (ML) INJECTION ONCE
Status: COMPLETED | OUTPATIENT
Start: 2018-06-21 | End: 2018-06-21

## 2018-06-21 RX ORDER — MELOXICAM 7.5 MG/1
7.5 TABLET ORAL DAILY
Qty: 14 TABLET | Refills: 0 | Status: SHIPPED | OUTPATIENT
Start: 2018-06-21 | End: 2018-06-28

## 2018-06-21 RX ORDER — KETOROLAC TROMETHAMINE 30 MG/ML
30 INJECTION, SOLUTION INTRAMUSCULAR; INTRAVENOUS ONCE
Status: COMPLETED | OUTPATIENT
Start: 2018-06-21 | End: 2018-06-21

## 2018-06-21 RX ORDER — BUTALBITAL, ACETAMINOPHEN AND CAFFEINE 50; 325; 40 MG/1; MG/1; MG/1
1 TABLET ORAL EVERY 6 HOURS PRN
Qty: 12 TABLET | Refills: 0 | Status: SHIPPED | OUTPATIENT
Start: 2018-06-21 | End: 2018-06-24

## 2018-06-21 RX ORDER — METOCLOPRAMIDE 10 MG/1
10 TABLET ORAL EVERY 6 HOURS PRN
Qty: 20 TABLET | Refills: 0 | Status: SHIPPED | OUTPATIENT
Start: 2018-06-21 | End: 2018-06-26

## 2018-06-21 NOTE — ED NOTES
Pt reports to ED with complaint of headache starting yesterday.  Pt has been having issues with her allergies, pt states she has taken multiple different OTC medications for her allergies, congestion, sinus pain, etc. Pt believes that her headache has progr

## 2018-06-21 NOTE — ED PROVIDER NOTES
Patient Seen in: [facility] Emergency Department    History   Patient presents with:  Headache (neurologic)    Stated Complaint:  cephalgia    HPI    39 yo F with PMH IDDM, HL, allergic rhinitis presenting with diffuse cephalgia for past two days re Hypertension Mother    • Cancer Maternal Grandmother      ovarian   • Stroke Maternal Grandmother    • Ovarian Cancer Maternal Grandmother    • Macular degeneration Neg    • Glaucoma Neg        Smoking status: Never Smoker reviewed.         ED Course     Labs Reviewed   POCT GLUCOSE - Abnormal; Notable for the following:        Result Value    POC Glucose  102 (*)     All other components within normal limits   CBC W/ DIFFERENTIAL - Abnormal; Notable for the following:     [facility] electrolyte derangement, gravid state, sinusitis, intracerebral mass/hemorrhage.     Pulse ox: 94%:Normal on RA, as interpreted by myself    Evaluation for cephalgia in setting of history of allergies though in patient with worsening pain without prior neur

## 2018-06-22 ENCOUNTER — OFFICE VISIT (OUTPATIENT)
Dept: INTERNAL MEDICINE CLINIC | Facility: CLINIC | Age: 43
End: 2018-06-22

## 2018-06-22 ENCOUNTER — APPOINTMENT (OUTPATIENT)
Dept: LAB | Age: 43
End: 2018-06-22
Attending: FAMILY MEDICINE
Payer: COMMERCIAL

## 2018-06-22 VITALS
WEIGHT: 248 LBS | BODY MASS INDEX: 42.34 KG/M2 | SYSTOLIC BLOOD PRESSURE: 144 MMHG | DIASTOLIC BLOOD PRESSURE: 94 MMHG | HEIGHT: 64 IN | HEART RATE: 97 BPM

## 2018-06-22 DIAGNOSIS — I10 ESSENTIAL HYPERTENSION: ICD-10-CM

## 2018-06-22 DIAGNOSIS — R51.9 HEADACHE DISORDER: Primary | ICD-10-CM

## 2018-06-22 DIAGNOSIS — J01.00 ACUTE NON-RECURRENT MAXILLARY SINUSITIS: ICD-10-CM

## 2018-06-22 DIAGNOSIS — E78.00 HYPERCHOLESTEREMIA: ICD-10-CM

## 2018-06-22 DIAGNOSIS — E11.9 DIABETES MELLITUS TYPE 2 WITHOUT RETINOPATHY (HCC): ICD-10-CM

## 2018-06-22 PROCEDURE — 99214 OFFICE O/P EST MOD 30 MIN: CPT | Performed by: INTERNAL MEDICINE

## 2018-06-22 PROCEDURE — 36415 COLL VENOUS BLD VENIPUNCTURE: CPT

## 2018-06-22 PROCEDURE — 80061 LIPID PANEL: CPT

## 2018-06-22 PROCEDURE — 99212 OFFICE O/P EST SF 10 MIN: CPT | Performed by: INTERNAL MEDICINE

## 2018-06-22 PROCEDURE — 80053 COMPREHEN METABOLIC PANEL: CPT

## 2018-06-22 RX ORDER — LISINOPRIL 10 MG/1
10 TABLET ORAL DAILY
Qty: 90 TABLET | Refills: 3 | Status: SHIPPED | OUTPATIENT
Start: 2018-06-22 | End: 2019-06-17

## 2018-06-22 RX ORDER — AMOXICILLIN AND CLAVULANATE POTASSIUM 875; 125 MG/1; MG/1
1 TABLET, FILM COATED ORAL 2 TIMES DAILY
Qty: 20 TABLET | Refills: 0 | Status: SHIPPED | OUTPATIENT
Start: 2018-06-22 | End: 2018-07-02

## 2018-06-22 NOTE — PROGRESS NOTES
Sin Choi is a 38year old female.   Patient presents with:  ER F/U: headache       HPI:   Pt comes for f/u   C/c htn and headaches   C/o headaches x 3 days   Here with   juancarlos   Has had sinus headaches before but not this bad   Can sleep o (RELION PEN NEEDLES) 32G X 4 MM Does not apply Misc Use with injections 2 times daily Disp: 200 each Rfl: 3   Insulin Syringe 31G X 5/16\" 0.5 ML Does not apply Misc by Does not apply route.  Disp:  Rfl:    Glucose Blood (CLAIRE CONTOUR NEXT TEST) In Vitro S redness and throat -- large tonsils without exudates   NECK: supple,no adenopathy,non tender , short thick neck   LUNGS: clear to auscultation, no wheeze  CARDIO: RRR without murmur  GI: obese   EXTREMITIES: no cyanosis, or edema    ASSESSMENT AND PLAN:

## 2018-06-28 ENCOUNTER — OFFICE VISIT (OUTPATIENT)
Dept: INTERNAL MEDICINE CLINIC | Facility: CLINIC | Age: 43
End: 2018-06-28

## 2018-06-28 VITALS
DIASTOLIC BLOOD PRESSURE: 83 MMHG | BODY MASS INDEX: 42.34 KG/M2 | SYSTOLIC BLOOD PRESSURE: 120 MMHG | WEIGHT: 248 LBS | HEIGHT: 64 IN | HEART RATE: 88 BPM

## 2018-06-28 DIAGNOSIS — I10 ESSENTIAL HYPERTENSION: ICD-10-CM

## 2018-06-28 DIAGNOSIS — IMO0001 UNCONTROLLED TYPE 2 DIABETES MELLITUS WITHOUT COMPLICATION, WITH LONG-TERM CURRENT USE OF INSULIN: ICD-10-CM

## 2018-06-28 DIAGNOSIS — G44.019 EPISODIC CLUSTER HEADACHE, NOT INTRACTABLE: Primary | ICD-10-CM

## 2018-06-28 PROCEDURE — 99214 OFFICE O/P EST MOD 30 MIN: CPT | Performed by: INTERNAL MEDICINE

## 2018-06-28 PROCEDURE — 99212 OFFICE O/P EST SF 10 MIN: CPT | Performed by: INTERNAL MEDICINE

## 2018-06-28 NOTE — PROGRESS NOTES
Divine Brumfield is a 38year old female.   Patient presents with:  Headache: follow up, has improved       HPI:   Patient comes for follow-up  C/c headache  C/ o  headaches have improved though having the pressure in her head a 6 out of 10  Having drainag 30 tablet Rfl: 6   Insulin Pen Needle (RELION PEN NEEDLES) 32G X 4 MM Does not apply Misc Use with injections 2 times daily Disp: 200 each Rfl: 3   Insulin Syringe 31G X 5/16\" 0.5 ML Does not apply Misc by Does not apply route.  Disp:  Rfl:    Glucose Bloo atraumatic, normocephalic  LUNGS: clear to auscultation, no wheeze  CARDIO: RRR without murmur  GI: obese   EXTREMITIES: no cyanosis, or edema    ASSESSMENT AND PLAN:   Diagnoses and all orders for this visit:    Episodic cluster headache, not intractable

## 2018-06-29 ENCOUNTER — OFFICE VISIT (OUTPATIENT)
Dept: ENDOCRINOLOGY CLINIC | Facility: CLINIC | Age: 43
End: 2018-06-29

## 2018-06-29 VITALS
BODY MASS INDEX: 43.19 KG/M2 | DIASTOLIC BLOOD PRESSURE: 86 MMHG | HEIGHT: 64 IN | HEART RATE: 82 BPM | SYSTOLIC BLOOD PRESSURE: 124 MMHG | WEIGHT: 253 LBS

## 2018-06-29 DIAGNOSIS — E11.8 UNCONTROLLED TYPE 2 DIABETES MELLITUS WITH COMPLICATION, UNSPECIFIED WHETHER LONG TERM INSULIN USE: Primary | ICD-10-CM

## 2018-06-29 DIAGNOSIS — E11.65 UNCONTROLLED TYPE 2 DIABETES MELLITUS WITH COMPLICATION, UNSPECIFIED WHETHER LONG TERM INSULIN USE: Primary | ICD-10-CM

## 2018-06-29 PROCEDURE — 83036 HEMOGLOBIN GLYCOSYLATED A1C: CPT | Performed by: INTERNAL MEDICINE

## 2018-06-29 PROCEDURE — 99214 OFFICE O/P EST MOD 30 MIN: CPT | Performed by: INTERNAL MEDICINE

## 2018-06-29 PROCEDURE — 82962 GLUCOSE BLOOD TEST: CPT | Performed by: INTERNAL MEDICINE

## 2018-06-29 PROCEDURE — 36416 COLLJ CAPILLARY BLOOD SPEC: CPT | Performed by: INTERNAL MEDICINE

## 2018-06-29 PROCEDURE — 99212 OFFICE O/P EST SF 10 MIN: CPT | Performed by: INTERNAL MEDICINE

## 2018-06-29 RX ORDER — GLIMEPIRIDE 4 MG/1
4 TABLET ORAL
Qty: 90 TABLET | Refills: 1 | Status: SHIPPED | OUTPATIENT
Start: 2018-06-29 | End: 2019-12-16

## 2018-06-29 RX ORDER — PEN NEEDLE, DIABETIC 31 G X1/4"
NEEDLE, DISPOSABLE MISCELLANEOUS
Qty: 100 EACH | Refills: 3 | Status: SHIPPED | OUTPATIENT
Start: 2018-06-29 | End: 2020-01-23

## 2018-06-29 NOTE — PROGRESS NOTES
Name: Miguel Morris  Date: 6/29/2018    Referring Physician: No ref. provider found    HISTORY OF PRESENT ILLNESS   Miguel Morris is a 38year old female who presents for diabetes mellitus, diagnosed in 2008.   She has been lost to follow up for ove 10 MG Oral Tab, Take 1 tablet (10 mg total) by mouth daily. , Disp: 90 tablet, Rfl: 3  •  atorvastatin 10 MG Oral Tab, Take 1 tablet (10 mg total) by mouth nightly., Disp: 90 tablet, Rfl: 0  •  Insulin Pen Needle (BD PEN NEEDLE ESTEFANY U/F) 32G X 4 MM Does not Surgical History:  No date:       Comment: 3      PHYSICAL EXAM  /86 (BP Location: Right arm, Patient Position: Sitting, Cuff Size: large)   Pulse 82   Ht 5' 4\" (1.626 m)   Wt 253 lb (114.8 kg)   BMI 43.43 kg/m²     General Appearance:  alert, w elevated, Continue atorvastatin 40mg PO daily    This is a 25 minute visit and greater than 50% of the time was spent counseling the patient and/or coordinating care.     RTC 2 month    6/29/2018  Reza Ocampo MD

## 2018-06-29 NOTE — PATIENT INSTRUCTIONS
Continue Glimepiride    Start Lantus 80 units SQ daily    Start Ozempic 0.25mg SQ weekly for one month then increase to 0.5mg SQ weekly

## 2018-08-02 ENCOUNTER — OFFICE VISIT (OUTPATIENT)
Dept: INTERNAL MEDICINE CLINIC | Facility: CLINIC | Age: 43
End: 2018-08-02
Payer: COMMERCIAL

## 2018-08-02 VITALS
DIASTOLIC BLOOD PRESSURE: 77 MMHG | HEIGHT: 64 IN | WEIGHT: 247 LBS | HEART RATE: 97 BPM | BODY MASS INDEX: 42.17 KG/M2 | SYSTOLIC BLOOD PRESSURE: 110 MMHG | TEMPERATURE: 98 F

## 2018-08-02 DIAGNOSIS — B37.3 CANDIDA VAGINITIS: ICD-10-CM

## 2018-08-02 DIAGNOSIS — Z23 NEED FOR MMR VACCINE: ICD-10-CM

## 2018-08-02 DIAGNOSIS — I10 ESSENTIAL HYPERTENSION: ICD-10-CM

## 2018-08-02 DIAGNOSIS — IMO0001 UNCONTROLLED TYPE 2 DIABETES MELLITUS WITHOUT COMPLICATION, WITH LONG-TERM CURRENT USE OF INSULIN: Primary | ICD-10-CM

## 2018-08-02 DIAGNOSIS — N91.1 SECONDARY AMENORRHEA: ICD-10-CM

## 2018-08-02 DIAGNOSIS — G43.009 MIGRAINE WITHOUT AURA AND WITHOUT STATUS MIGRAINOSUS, NOT INTRACTABLE: ICD-10-CM

## 2018-08-02 PROBLEM — B37.31 CANDIDA VAGINITIS: Status: ACTIVE | Noted: 2018-08-02

## 2018-08-02 PROCEDURE — 99212 OFFICE O/P EST SF 10 MIN: CPT | Performed by: INTERNAL MEDICINE

## 2018-08-02 PROCEDURE — 90707 MMR VACCINE SC: CPT | Performed by: INTERNAL MEDICINE

## 2018-08-02 PROCEDURE — 99214 OFFICE O/P EST MOD 30 MIN: CPT | Performed by: INTERNAL MEDICINE

## 2018-08-02 PROCEDURE — 90471 IMMUNIZATION ADMIN: CPT | Performed by: INTERNAL MEDICINE

## 2018-08-02 RX ORDER — NYSTATIN AND TRIAMCINOLONE ACETONIDE 100000; 1 [USP'U]/G; MG/G
1 OINTMENT TOPICAL 2 TIMES DAILY
Qty: 15 G | Refills: 0 | Status: SHIPPED | OUTPATIENT
Start: 2018-08-02 | End: 2019-10-22

## 2018-08-02 NOTE — PROGRESS NOTES
Julianna Lobato is a 41year old female. Patient presents with: Follow - Up  Blood Pressure  Imm/Inj: 2nd MMR booster?   Headache: none recently       HPI:   Pt comes for f/u  C/c htn dm ha   C/o migraines are better -- thinking it is due to more freque Inject 0.5 mg into the skin once a week.  Disp: 3 mL Rfl: 3   Insulin Pen Needle (PEN NEEDLES) 31G X 6 MM Does not apply Misc Inject once daily Disp: 100 each Rfl: 3   glimepiride 4 MG Oral Tab Take 1 tablet (4 mg total) by mouth every morning before breakf denies chest pain on exertion, palpitations, swelling in feet  NEURO: denies headaches , anxiety, depression  Feet–without ulcers or calluses sensations grossly intact and also define filament b/l     EXAM:   /77   Pulse 97   Temp 97.8 °F (36.6 °C) (

## 2018-08-02 NOTE — PATIENT INSTRUCTIONS
Diabetes: Activity Tips    Being more active can help you manage your diabetes. The tips on this sheet can help you get the most from your exercise. They can also help you stay safe.   Staying Active  It’s important for adults to spend less time sitting a You may be told to plan your exercise for 1 to 2 hours after a meal. In most cases, you don’t need to eat while being active. If you take insulin or medicine that can cause low blood sugar, test your blood sugar before exercising.  And carry a fast-acting s

## 2018-08-15 ENCOUNTER — TELEPHONE (OUTPATIENT)
Dept: OPHTHALMOLOGY | Facility: CLINIC | Age: 43
End: 2018-08-15

## 2018-08-15 NOTE — TELEPHONE ENCOUNTER
I spoke to patient and told her that her last glasses Rx was from 9/07/16. She declined a refraction when she was here in 6/2018 because she had just gotten new glasses in Jan 2018.   Patient understood and said she would call [facility] who did her Rx

## 2018-08-15 NOTE — TELEPHONE ENCOUNTER
pt called. She is at [facility] now. Could you please fax RX for glasses to fax# [phone]. She states this was not in her MyChart. Thank you.

## 2018-09-05 ENCOUNTER — OFFICE VISIT (OUTPATIENT)
Dept: OBGYN CLINIC | Facility: CLINIC | Age: 43
End: 2018-09-05
Payer: COMMERCIAL

## 2018-09-05 VITALS
HEART RATE: 86 BPM | DIASTOLIC BLOOD PRESSURE: 87 MMHG | BODY MASS INDEX: 42 KG/M2 | SYSTOLIC BLOOD PRESSURE: 128 MMHG | WEIGHT: 245 LBS

## 2018-09-05 DIAGNOSIS — N92.6 IRREGULAR PERIODS: Primary | ICD-10-CM

## 2018-09-05 DIAGNOSIS — N92.1 MENORRHAGIA WITH IRREGULAR CYCLE: ICD-10-CM

## 2018-09-05 PROCEDURE — 99243 OFF/OP CNSLTJ NEW/EST LOW 30: CPT | Performed by: OBSTETRICS & GYNECOLOGY

## 2018-09-05 NOTE — PROGRESS NOTES
Brittany Orona is a 41year old female  Patient's last menstrual period was 2018 (approximate).  Patient presents with:  Gyn Exam: Pt referred from her PCP, says that she has only had 3 periods this year, given her moms history wanted to get Potassium Chloride ER 10 MEQ Oral Tab CR, Take 1 tablet (10 mEq total) by mouth daily. , Disp: 30 tablet, Rfl: 3  •  insulin glargine (LANTUS SOLOSTAR) 100 UNIT/ML Subcutaneous Solution Pen-injector, Inject 80 Units into the skin nightly., Disp: 30 mL, Rfl: diarrhea or constipation  Genitourinary:    denies dysuria, incontinence, abnormal vaginal discharge, vaginal itching  Musculoskeletal:   denies back pain. Skin/Breast:    denies any breast pain, lumps, or discharge.    Neurological:    denies headaches, e / neg.

## 2018-09-10 ENCOUNTER — HOSPITAL ENCOUNTER (OUTPATIENT)
Dept: ULTRASOUND IMAGING | Facility: HOSPITAL | Age: 43
Discharge: HOME | End: 2018-09-10
Attending: OBSTETRICS & GYNECOLOGY
Payer: COMMERCIAL

## 2018-09-10 DIAGNOSIS — N92.6 IRREGULAR PERIODS: ICD-10-CM

## 2018-09-10 PROCEDURE — 76830 TRANSVAGINAL US NON-OB: CPT | Performed by: OBSTETRICS & GYNECOLOGY

## 2018-09-10 PROCEDURE — 76856 US EXAM PELVIC COMPLETE: CPT | Performed by: OBSTETRICS & GYNECOLOGY

## 2018-09-13 ENCOUNTER — APPOINTMENT (OUTPATIENT)
Dept: LAB | Facility: HOSPITAL | Age: 43
End: 2018-09-13
Attending: OBSTETRICS & GYNECOLOGY
Payer: COMMERCIAL

## 2018-09-13 DIAGNOSIS — N92.6 IRREGULAR PERIODS: ICD-10-CM

## 2018-09-13 LAB — HCG SERPL QL: NEGATIVE

## 2018-09-13 PROCEDURE — 36415 COLL VENOUS BLD VENIPUNCTURE: CPT

## 2018-09-13 PROCEDURE — 84703 CHORIONIC GONADOTROPIN ASSAY: CPT

## 2018-09-14 ENCOUNTER — OFFICE VISIT (OUTPATIENT)
Dept: OBGYN CLINIC | Facility: CLINIC | Age: 43
End: 2018-09-14
Payer: COMMERCIAL

## 2018-09-14 VITALS
SYSTOLIC BLOOD PRESSURE: 121 MMHG | BODY MASS INDEX: 42 KG/M2 | HEART RATE: 84 BPM | WEIGHT: 243 LBS | DIASTOLIC BLOOD PRESSURE: 77 MMHG

## 2018-09-14 DIAGNOSIS — N92.6 IRREGULAR MENSES: Primary | ICD-10-CM

## 2018-09-14 DIAGNOSIS — N92.6 IRREGULAR MENSTRUAL CYCLE: ICD-10-CM

## 2018-09-14 PROCEDURE — 58100 BIOPSY OF UTERUS LINING: CPT | Performed by: OBSTETRICS & GYNECOLOGY

## 2018-09-14 RX ORDER — ATORVASTATIN CALCIUM 20 MG/1
10 TABLET, FILM COATED ORAL
COMMUNITY
End: 2018-09-14

## 2018-09-19 ENCOUNTER — TELEPHONE (OUTPATIENT)
Dept: OBGYN CLINIC | Facility: CLINIC | Age: 43
End: 2018-09-19

## 2018-09-28 ENCOUNTER — OFFICE VISIT (OUTPATIENT)
Dept: ENDOCRINOLOGY CLINIC | Facility: CLINIC | Age: 43
End: 2018-09-28
Payer: COMMERCIAL

## 2018-09-28 VITALS
BODY MASS INDEX: 42 KG/M2 | DIASTOLIC BLOOD PRESSURE: 77 MMHG | WEIGHT: 241.81 LBS | SYSTOLIC BLOOD PRESSURE: 116 MMHG | HEART RATE: 83 BPM

## 2018-09-28 DIAGNOSIS — IMO0001 UNCONTROLLED TYPE 2 DIABETES MELLITUS WITHOUT COMPLICATION, WITH LONG-TERM CURRENT USE OF INSULIN: Primary | ICD-10-CM

## 2018-09-28 LAB
CARTRIDGE LOT#: ABNORMAL NUMERIC
GLUCOSE BLOOD: 127
HEMOGLOBIN A1C: 7.8 % (ref 4.3–5.6)
TEST STRIP LOT #: NORMAL NUMERIC

## 2018-09-28 PROCEDURE — 90471 IMMUNIZATION ADMIN: CPT | Performed by: INTERNAL MEDICINE

## 2018-09-28 PROCEDURE — 99212 OFFICE O/P EST SF 10 MIN: CPT | Performed by: INTERNAL MEDICINE

## 2018-09-28 PROCEDURE — 90686 IIV4 VACC NO PRSV 0.5 ML IM: CPT | Performed by: INTERNAL MEDICINE

## 2018-09-28 PROCEDURE — 83036 HEMOGLOBIN GLYCOSYLATED A1C: CPT | Performed by: INTERNAL MEDICINE

## 2018-09-28 PROCEDURE — 82962 GLUCOSE BLOOD TEST: CPT | Performed by: INTERNAL MEDICINE

## 2018-09-28 PROCEDURE — 36416 COLLJ CAPILLARY BLOOD SPEC: CPT | Performed by: INTERNAL MEDICINE

## 2018-09-28 PROCEDURE — 99213 OFFICE O/P EST LOW 20 MIN: CPT | Performed by: INTERNAL MEDICINE

## 2018-09-28 NOTE — PROGRESS NOTES
Name: Edgardo Rodas  Date: 9/28/2018    Referring Physician: No ref. provider found    HISTORY OF PRESENT ILLNESS   Edgardo Rodas is a 41year old female who presents for diabetes mellitus, diagnosed in 2008.   She has been lost to follow up for ove tablet (10 mEq total) by mouth daily. , Disp: 30 tablet, Rfl: 3  •  Insulin NPH & Regular (70-30) 100 UNIT/ML Subcutaneous Suspension Pen-injector, Inject into the skin., Disp: , Rfl:   •  insulin glargine (LANTUS SOLOSTAR) 100 UNIT/ML Subcutaneous Solution Not on file      Transportation needs - non-medical: Not on file    Occupational History      Not on file    Tobacco Use      Smoking status: Never Smoker      Smokeless tobacco: Never Used    Substance and Sexual Activity      Alcohol use: No      Drug us muscle strength and tone  Skin:  normal moisture and skin texture  Neuro:  sensory grossly intact and motor grossly intact  Psychiatric:  oriented to time, self, and place  Nutritional:  no abnormal weight gain or loss    ASSESSMENT/PLAN:      1.  Diabetes

## 2018-10-05 ENCOUNTER — TELEPHONE (OUTPATIENT)
Dept: GASTROENTEROLOGY | Facility: CLINIC | Age: 43
End: 2018-10-05

## 2018-10-05 ENCOUNTER — LAB ENCOUNTER (OUTPATIENT)
Dept: LAB | Facility: HOSPITAL | Age: 43
End: 2018-10-05
Attending: PHYSICIAN ASSISTANT
Payer: COMMERCIAL

## 2018-10-05 ENCOUNTER — OFFICE VISIT (OUTPATIENT)
Dept: GASTROENTEROLOGY | Facility: CLINIC | Age: 43
End: 2018-10-05
Payer: COMMERCIAL

## 2018-10-05 VITALS
WEIGHT: 244 LBS | SYSTOLIC BLOOD PRESSURE: 120 MMHG | DIASTOLIC BLOOD PRESSURE: 81 MMHG | HEIGHT: 64 IN | BODY MASS INDEX: 41.66 KG/M2 | HEART RATE: 76 BPM

## 2018-10-05 DIAGNOSIS — R10.13 EPIGASTRIC PAIN: ICD-10-CM

## 2018-10-05 DIAGNOSIS — R12 HEARTBURN: Primary | ICD-10-CM

## 2018-10-05 DIAGNOSIS — R12 HEARTBURN: ICD-10-CM

## 2018-10-05 DIAGNOSIS — K59.00 CONSTIPATION, UNSPECIFIED CONSTIPATION TYPE: ICD-10-CM

## 2018-10-05 PROCEDURE — 36415 COLL VENOUS BLD VENIPUNCTURE: CPT

## 2018-10-05 PROCEDURE — 99212 OFFICE O/P EST SF 10 MIN: CPT | Performed by: PHYSICIAN ASSISTANT

## 2018-10-05 PROCEDURE — 83690 ASSAY OF LIPASE: CPT

## 2018-10-05 PROCEDURE — 99204 OFFICE O/P NEW MOD 45 MIN: CPT | Performed by: PHYSICIAN ASSISTANT

## 2018-10-05 PROCEDURE — 80076 HEPATIC FUNCTION PANEL: CPT

## 2018-10-05 PROCEDURE — 85025 COMPLETE CBC W/AUTO DIFF WBC: CPT

## 2018-10-05 RX ORDER — PANTOPRAZOLE SODIUM 40 MG/1
40 TABLET, DELAYED RELEASE ORAL
Qty: 90 TABLET | Refills: 0 | Status: SHIPPED | OUTPATIENT
Start: 2018-10-05 | End: 2018-10-19

## 2018-10-05 NOTE — TELEPHONE ENCOUNTER
Per LOV note 9/28/18 patient was given the following instructions:    -Continue Lantus 80 units SQ daily   -Continue Ozempic 0.5mg SQ weekly, verbalized understanding of risks and benefits  -Continue Glimepiride 4mg PO daily  -No Metformin therapy given si

## 2018-10-05 NOTE — TELEPHONE ENCOUNTER
GI RNs, please obtain recommendations from Dr. Gonzalo Adam for this patient's upper endoscopy with MAC sedation.

## 2018-10-05 NOTE — TELEPHONE ENCOUNTER
[facility]  Provider Name:   Date:  10/16/18  Location:  [facility]  Sedation:  Mac  Time:  1300 / Arrival 1200  Prep: Npo 3 hours prior to arrival   Meds/Allergies Reconciled?:  Physician reviewed  Diagnosis with codes:  Heartburn Rebeccaster Began difficulty with the abdominal pain and the shortness of breath, I did recommend you go to the ER today. I strongly urge you that if this continues even into the remainder of the day, that you proceed to the ER for urgent evaluation.                 Further

## 2018-10-05 NOTE — PATIENT INSTRUCTIONS
1. Upper endoscopy with MAC sedation @ [facility] - Dr. Kacy Mann. 2. US of the Abdomen Complete - please call to schedule this. We may consider a CT scan of the abdomen/pelvis pending the result of the labs/your course with symptoms. 3. Labs today.     4.

## 2018-10-05 NOTE — TELEPHONE ENCOUNTER
Routed to Endo RN's- please advise on DM med adjustment orders prior to pt's EGD w/ Dr. Blane Armstrong on 10/16/18, pt will be NPO at midnight, thank you.

## 2018-10-05 NOTE — H&P
[address] [facility] [address] Gastroenterology                                                                                                  Clinic History and Physical     Pa seems to make her symptoms worse. She is taking Aleve daily or BID for the past few weeks. She has been having problems with constipation all of her life. She does not take anything for this.  She has a bowel movement each day however it is generally ha Smoker      Smokeless tobacco: Never Used    Alcohol use: No    Drug use: No       Medications (Active prior to today's visit):    Current Outpatient Medications:  Pantoprazole Sodium 40 MG Oral Tab EC Take 1 tablet (40 mg total) by mouth every morning bef Apply 1 Application topically 2 (two) times daily.  Disp: 15 g Rfl: 0       Allergies:  No Known Allergies    ROS:   CONSTITUTIONAL:  negative for fevers, rigors  EYES:  negative for diplopia   RESPIRATORY:  +SOB  CARDIOVASCULAR:  negative for crushing sub- absence of overt anemia, (+) mild microcytosis, no transaminitis. Presents today looking quite uncomfortable and refusing ER evaluation x 3.     # Epigastric Pain with radiation to the back  # Heartburn  # Hx of Nausea: resolved  # Constipation    Upper GI intervention [i.e. polypectomy, ablation, stent placement, etc.] as indicated.     Orders This Visit:  Orders Placed This Encounter      CBC W Differential W Platelet      Hepatic Function Panel (7)      Lipase      Meds This Visit:  Requested Prescriptions

## 2018-10-08 NOTE — TELEPHONE ENCOUNTER
I have copied Dr Vikram Deluca if she wants to adjust but I suggest the following:      Lantus 40 units the day of the procedure (one half of the daily dose)     -Continue Ozempic 0.5mg SQ weekly, (unless due on the day of the procedure ) then delay a day     Decr

## 2018-10-09 ENCOUNTER — TELEPHONE (OUTPATIENT)
Dept: GASTROENTEROLOGY | Facility: CLINIC | Age: 43
End: 2018-10-09

## 2018-10-09 ENCOUNTER — HOSPITAL ENCOUNTER (OUTPATIENT)
Dept: ULTRASOUND IMAGING | Age: 43
Discharge: HOME | End: 2018-10-09
Attending: PHYSICIAN ASSISTANT
Payer: COMMERCIAL

## 2018-10-09 DIAGNOSIS — R10.13 EPIGASTRIC PAIN: ICD-10-CM

## 2018-10-09 DIAGNOSIS — R12 HEARTBURN: ICD-10-CM

## 2018-10-09 DIAGNOSIS — R16.0 HEPATOMEGALY: ICD-10-CM

## 2018-10-09 DIAGNOSIS — K59.00 CONSTIPATION, UNSPECIFIED CONSTIPATION TYPE: ICD-10-CM

## 2018-10-09 DIAGNOSIS — K76.9 LIVER LESION: Primary | ICD-10-CM

## 2018-10-09 PROCEDURE — 76700 US EXAM ABDOM COMPLETE: CPT | Performed by: PHYSICIAN ASSISTANT

## 2018-10-09 NOTE — TELEPHONE ENCOUNTER
[facility] Pt verbalized understanding of all and copy of all instructions sent to her via [employer] for her review.

## 2018-10-15 ENCOUNTER — TELEPHONE (OUTPATIENT)
Dept: GASTROENTEROLOGY | Facility: CLINIC | Age: 43
End: 2018-10-15

## 2018-10-15 DIAGNOSIS — R10.13 EPIGASTRIC PAIN: ICD-10-CM

## 2018-10-15 DIAGNOSIS — R10.13 EPIGASTRIC PAIN IN PEDIATRIC PATIENT: ICD-10-CM

## 2018-10-15 DIAGNOSIS — R12 HEARTBURN: Primary | ICD-10-CM

## 2018-10-15 NOTE — TELEPHONE ENCOUNTER
Patient left message with answering service to cancel procedure with Dr Mey Howard for Tuesday 10/16

## 2018-10-15 NOTE — TELEPHONE ENCOUNTER
PB-FYI    Pt contacted and states she does NOT feel she needs to undergo the EGD at this time, and would prefer to cancel at this time. Pt informed she is to c/b if she changes her mind and wishes to reschedule in the future.  Pt removed from provider sched

## 2018-10-22 NOTE — TELEPHONE ENCOUNTER
Given the patient's clinical picture, I did discuss with her the importance of EGD, however if she feels that she does not need this procedure, I have noted her request. If her clinical situation changes, she is welcome to see me in office for re-assessmen

## 2018-11-03 ENCOUNTER — HOSPITAL ENCOUNTER (OUTPATIENT)
Dept: MRI IMAGING | Facility: HOSPITAL | Age: 43
Discharge: HOME | End: 2018-11-03
Attending: PHYSICIAN ASSISTANT
Payer: COMMERCIAL

## 2018-11-03 DIAGNOSIS — R16.0 HEPATOMEGALY: ICD-10-CM

## 2018-11-03 DIAGNOSIS — K76.9 LIVER LESION: ICD-10-CM

## 2018-11-03 PROCEDURE — 82565 ASSAY OF CREATININE: CPT

## 2018-11-03 PROCEDURE — A9575 INJ GADOTERATE MEGLUMI 0.1ML: HCPCS | Performed by: PHYSICIAN ASSISTANT

## 2018-11-03 PROCEDURE — 74183 MRI ABD W/O CNTR FLWD CNTR: CPT | Performed by: PHYSICIAN ASSISTANT

## 2018-11-28 ENCOUNTER — TELEPHONE (OUTPATIENT)
Dept: ENDOCRINOLOGY CLINIC | Facility: CLINIC | Age: 43
End: 2018-11-28

## 2018-11-28 NOTE — TELEPHONE ENCOUNTER
Pharmacy faxed a new RX request for Lantua 100 Unit/MI Inj Scarlet Grater 30  Last filled 10/29/2018

## 2019-06-12 ENCOUNTER — OFFICE VISIT (OUTPATIENT)
Dept: INTERNAL MEDICINE CLINIC | Facility: CLINIC | Age: 44
End: 2019-06-12
Payer: COMMERCIAL

## 2019-06-12 VITALS
SYSTOLIC BLOOD PRESSURE: 137 MMHG | WEIGHT: 236 LBS | HEIGHT: 64 IN | DIASTOLIC BLOOD PRESSURE: 92 MMHG | HEART RATE: 99 BPM | BODY MASS INDEX: 40.29 KG/M2

## 2019-06-12 DIAGNOSIS — Z12.31 SCREENING MAMMOGRAM, ENCOUNTER FOR: ICD-10-CM

## 2019-06-12 DIAGNOSIS — I10 ESSENTIAL HYPERTENSION: Primary | ICD-10-CM

## 2019-06-12 DIAGNOSIS — K21.9 GASTRIC REFLUX: ICD-10-CM

## 2019-06-12 DIAGNOSIS — E66.01 MORBID OBESITY WITH BMI OF 40.0-44.9, ADULT (HCC): ICD-10-CM

## 2019-06-12 DIAGNOSIS — E11.9 DIABETES MELLITUS TYPE 2 WITHOUT RETINOPATHY (HCC): ICD-10-CM

## 2019-06-12 PROCEDURE — 99214 OFFICE O/P EST MOD 30 MIN: CPT | Performed by: INTERNAL MEDICINE

## 2019-06-12 PROCEDURE — 99212 OFFICE O/P EST SF 10 MIN: CPT | Performed by: INTERNAL MEDICINE

## 2019-06-12 NOTE — PROGRESS NOTES
Alex Starr is a 41year old female. Patient presents with:  Diabetes: follow up.  need order for mammogram to have done at [place]      HPI:   Patient comes for follow-up  C/C dm, htn .  Needs a mammogram   C/o overall she feels good and started runn Lancets 30G Does not apply Misc Check 2 times daily, lancets for contour Disp: 100 each Rfl: 6   Insulin Syringe 30G X 5/16\" 1 ML Does not apply Misc Use with insulin 4 times per day Disp: 150 each Rfl: 0   Insulin NPH & Regular (70-30) 100 UNIT/ML Subc tenderness  EXTREMITIES: no edema  BREAST: Bilateral breasts without any lumps, bilateral axilla without any lymphadenopathy, no nipple retraction or  discharge      ASSESSMENT AND PLAN:   Diagnoses and all orders for this visit:    Essential hypertension

## 2019-06-25 ENCOUNTER — MED REC SCAN ONLY (OUTPATIENT)
Dept: INTERNAL MEDICINE CLINIC | Facility: CLINIC | Age: 44
End: 2019-06-25

## 2019-10-07 ENCOUNTER — MED REC SCAN ONLY (OUTPATIENT)
Dept: INTERNAL MEDICINE CLINIC | Facility: CLINIC | Age: 44
End: 2019-10-07

## 2019-10-22 ENCOUNTER — OFFICE VISIT (OUTPATIENT)
Dept: INTERNAL MEDICINE CLINIC | Facility: CLINIC | Age: 44
End: 2019-10-22
Payer: COMMERCIAL

## 2019-10-22 VITALS
DIASTOLIC BLOOD PRESSURE: 90 MMHG | WEIGHT: 247 LBS | BODY MASS INDEX: 42.17 KG/M2 | HEIGHT: 64 IN | SYSTOLIC BLOOD PRESSURE: 143 MMHG | HEART RATE: 82 BPM

## 2019-10-22 DIAGNOSIS — E55.9 VITAMIN D DEFICIENCY: ICD-10-CM

## 2019-10-22 DIAGNOSIS — I10 ESSENTIAL HYPERTENSION: ICD-10-CM

## 2019-10-22 DIAGNOSIS — Z00.00 PHYSICAL EXAM: Primary | ICD-10-CM

## 2019-10-22 DIAGNOSIS — IMO0001 UNCONTROLLED TYPE 2 DIABETES MELLITUS WITHOUT COMPLICATION, WITH LONG-TERM CURRENT USE OF INSULIN: ICD-10-CM

## 2019-10-22 DIAGNOSIS — R92.8 ABNORMAL MAMMOGRAM: ICD-10-CM

## 2019-10-22 DIAGNOSIS — D75.839 THROMBOCYTOSIS: ICD-10-CM

## 2019-10-22 DIAGNOSIS — E66.9 OBESITY (BMI 30-39.9): ICD-10-CM

## 2019-10-22 PROCEDURE — 99396 PREV VISIT EST AGE 40-64: CPT | Performed by: INTERNAL MEDICINE

## 2019-10-22 RX ORDER — ATORVASTATIN CALCIUM 10 MG/1
10 TABLET, FILM COATED ORAL NIGHTLY
Qty: 90 TABLET | Refills: 3 | Status: SHIPPED | OUTPATIENT
Start: 2019-10-22 | End: 2021-05-11

## 2019-10-22 RX ORDER — LISINOPRIL 10 MG/1
10 TABLET ORAL DAILY
Qty: 90 TABLET | Refills: 3 | Status: SHIPPED | OUTPATIENT
Start: 2019-10-22 | End: 2019-12-16

## 2019-10-22 RX ORDER — LISINOPRIL 10 MG/1
10 TABLET ORAL DAILY
COMMUNITY
End: 2019-10-22

## 2019-10-22 RX ORDER — INSULIN ASPART 100 [IU]/ML
20 INJECTION, SOLUTION INTRAVENOUS; SUBCUTANEOUS
Qty: 3 VIAL | Refills: 3 | Status: SHIPPED | OUTPATIENT
Start: 2019-10-22 | End: 2019-10-29

## 2019-10-22 NOTE — PROGRESS NOTES
Armida Barnett is a 45year old female.   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[place] [id number] Check 2 times daily, Disp: 100 strip, Rfl: 6  Lancets 30G Does not apply Misc, Check 2 times daily, lancets for contour, Disp: 100 each, Rfl: 6  Insulin Syringe 30G X 5/16\" 1 ML Does not apply Misc, Use with insulin 4 times per day, Disp: 150 each, Rfl: 0 lesions, noted acanthosis nigricans and swelling in the back of the neck  HEENT: atraumatic, normocephalic,ears and throat are clear, no frontal or maxillary sinus tenderness, pupils equal reactive light bilaterally, extraocular muscles intact  NECK: suppl on acei, statin   Diet -- advised to follow a low carb, low sugar diet , try to be consistent                Exercise 30 min a day   Sees dr Lakeshia Santo  Try Lantus and novolog ,stop 70/30 , consider  victoza and cont glimiperide , Could not tolerate metformin

## 2019-10-23 NOTE — PATIENT INSTRUCTIONS
Prevention Guidelines, Women Ages 36 to 52  Screening tests and vaccines are an important part of managing your health. A screening test is done to find diseases in people who don't have any symptoms.  The goal is to find a disease early so lifestyle  · Flexible sigmoidoscopy every 5 years, or  · Colonoscopy every 10 years, or  · CT colonography (virtual colonoscopy) every 5 years, or  · Yearly fecal occult blood test, or  · Yearly fecal immunochemical test every year, or  · Stool DNA test, every 3 year Chickenpox (varicella) All women in this age group who have no record of this infection or vaccine 2 doses; the second dose should be given at least 4 weeks after the first dose   Hepatitis A Women at increased risk for infection–talk with your healthcare Use of tobacco and the health effects it can cause All women in this age group Every exam   1 American Diabetes Association  2 American College of Obstetricians and Gynecologists   [address]  [address] [facility]  Date Last R

## 2019-10-26 ENCOUNTER — LAB ENCOUNTER (OUTPATIENT)
Dept: LAB | Age: 44
End: 2019-10-26
Attending: INTERNAL MEDICINE
Payer: COMMERCIAL

## 2019-10-26 DIAGNOSIS — E66.9 OBESITY (BMI 30-39.9): ICD-10-CM

## 2019-10-26 DIAGNOSIS — E55.9 VITAMIN D DEFICIENCY: ICD-10-CM

## 2019-10-26 DIAGNOSIS — IMO0001 UNCONTROLLED TYPE 2 DIABETES MELLITUS WITHOUT COMPLICATION, WITH LONG-TERM CURRENT USE OF INSULIN: ICD-10-CM

## 2019-10-26 PROCEDURE — 84443 ASSAY THYROID STIM HORMONE: CPT

## 2019-10-26 PROCEDURE — 36415 COLL VENOUS BLD VENIPUNCTURE: CPT

## 2019-10-26 PROCEDURE — 83036 HEMOGLOBIN GLYCOSYLATED A1C: CPT

## 2019-10-26 PROCEDURE — 85025 COMPLETE CBC W/AUTO DIFF WBC: CPT

## 2019-10-26 PROCEDURE — 82043 UR ALBUMIN QUANTITATIVE: CPT

## 2019-10-26 PROCEDURE — 82306 VITAMIN D 25 HYDROXY: CPT

## 2019-10-26 PROCEDURE — 82570 ASSAY OF URINE CREATININE: CPT

## 2019-10-29 ENCOUNTER — TELEPHONE (OUTPATIENT)
Dept: CARDIOLOGY CLINIC | Facility: CLINIC | Age: 44
End: 2019-10-29

## 2019-10-29 DIAGNOSIS — IMO0001 UNCONTROLLED TYPE 2 DIABETES MELLITUS WITHOUT COMPLICATION, WITH LONG-TERM CURRENT USE OF INSULIN: ICD-10-CM

## 2019-10-29 RX ORDER — ERGOCALCIFEROL 1.25 MG/1
50000 CAPSULE ORAL WEEKLY
Qty: 4 CAPSULE | Refills: 3 | Status: SHIPPED | OUTPATIENT
Start: 2019-10-29 | End: 2020-09-14

## 2019-10-29 NOTE — TELEPHONE ENCOUNTER
Prior auth needed for med below. Key.covermymeds. ClassBadges  Key: [id number]    Current Outpatient Medications:     •  insulin glargine (LANTUS) 100 UNIT/ML Subcutaneous Solution, Inject 70 Units into the skin nightly., Disp: 3 vial, Rfl: 3

## 2019-10-30 RX ORDER — INSULIN ASPART 100 [IU]/ML
INJECTION, SOLUTION INTRAVENOUS; SUBCUTANEOUS
Qty: 60 ML | Refills: 1 | Status: SHIPPED | OUTPATIENT
Start: 2019-10-30 | End: 2020-01-23

## 2019-11-01 NOTE — TELEPHONE ENCOUNTER
Per CMM :  Additional Information Required  Please advise the dispensing pharmacy to contact the [address] at [phone] for assistance. Contacted [facility] and spoke with Víctor Tyson, [address].    PA approved effective 10/02/2019-11/01/2020 (f

## 2019-11-22 ENCOUNTER — MED REC SCAN ONLY (OUTPATIENT)
Dept: INTERNAL MEDICINE CLINIC | Facility: CLINIC | Age: 44
End: 2019-11-22

## 2019-11-26 ENCOUNTER — TELEPHONE (OUTPATIENT)
Dept: INTERNAL MEDICINE CLINIC | Facility: CLINIC | Age: 44
End: 2019-11-26

## 2019-11-26 NOTE — TELEPHONE ENCOUNTER
Per Prachi Castaneda patient has an appointment tomorrow and need the Order fix please put it as  ANDERS 2D + 3D diagnostic with US if needed.

## 2019-11-27 ENCOUNTER — HOSPITAL ENCOUNTER (OUTPATIENT)
Dept: MAMMOGRAPHY | Facility: HOSPITAL | Age: 44
Discharge: HOME | End: 2019-11-27
Attending: INTERNAL MEDICINE
Payer: COMMERCIAL

## 2019-11-27 DIAGNOSIS — R92.8 ABNORMAL MAMMOGRAM: ICD-10-CM

## 2019-12-06 ENCOUNTER — HOSPITAL ENCOUNTER (OUTPATIENT)
Dept: MAMMOGRAPHY | Facility: HOSPITAL | Age: 44
Discharge: HOME | End: 2019-12-06
Attending: INTERNAL MEDICINE
Payer: COMMERCIAL

## 2019-12-06 PROCEDURE — 77065 DX MAMMO INCL CAD UNI: CPT | Performed by: INTERNAL MEDICINE

## 2019-12-06 PROCEDURE — 77061 BREAST TOMOSYNTHESIS UNI: CPT | Performed by: INTERNAL MEDICINE

## 2019-12-16 ENCOUNTER — OFFICE VISIT (OUTPATIENT)
Dept: INTERNAL MEDICINE CLINIC | Facility: CLINIC | Age: 44
End: 2019-12-16
Payer: COMMERCIAL

## 2019-12-16 VITALS
DIASTOLIC BLOOD PRESSURE: 82 MMHG | SYSTOLIC BLOOD PRESSURE: 117 MMHG | OXYGEN SATURATION: 99 % | TEMPERATURE: 99 F | WEIGHT: 251 LBS | RESPIRATION RATE: 22 BRPM | HEART RATE: 98 BPM | BODY MASS INDEX: 42.85 KG/M2 | HEIGHT: 64 IN

## 2019-12-16 DIAGNOSIS — I10 ESSENTIAL HYPERTENSION: ICD-10-CM

## 2019-12-16 DIAGNOSIS — E55.9 VITAMIN D DEFICIENCY: ICD-10-CM

## 2019-12-16 DIAGNOSIS — R05.9 COUGH: ICD-10-CM

## 2019-12-16 DIAGNOSIS — E11.9 DIABETES MELLITUS TYPE 2 WITHOUT RETINOPATHY (HCC): ICD-10-CM

## 2019-12-16 DIAGNOSIS — J06.9 UPPER RESPIRATORY TRACT INFECTION, UNSPECIFIED TYPE: Primary | ICD-10-CM

## 2019-12-16 PROCEDURE — 99214 OFFICE O/P EST MOD 30 MIN: CPT | Performed by: INTERNAL MEDICINE

## 2019-12-16 RX ORDER — GUAIFENESIN AND CODEINE PHOSPHATE 100; 10 MG/5ML; MG/5ML
5 SOLUTION ORAL NIGHTLY PRN
Qty: 118 ML | Refills: 0 | Status: SHIPPED | OUTPATIENT
Start: 2019-12-16 | End: 2019-12-23

## 2019-12-16 RX ORDER — BENZONATATE 100 MG/1
100 CAPSULE ORAL 2 TIMES DAILY PRN
Qty: 20 CAPSULE | Refills: 0 | Status: SHIPPED | OUTPATIENT
Start: 2019-12-16 | End: 2019-12-23

## 2019-12-16 RX ORDER — GLIMEPIRIDE 4 MG/1
4 TABLET ORAL
Qty: 90 TABLET | Refills: 1 | Status: SHIPPED | OUTPATIENT
Start: 2019-12-16 | End: 2020-05-01

## 2019-12-16 RX ORDER — AMOXICILLIN AND CLAVULANATE POTASSIUM 875; 125 MG/1; MG/1
1 TABLET, FILM COATED ORAL 2 TIMES DAILY
Qty: 20 TABLET | Refills: 0 | Status: SHIPPED | OUTPATIENT
Start: 2019-12-16 | End: 2019-12-26

## 2019-12-16 NOTE — PROGRESS NOTES
Isra Smith is a 45year old female.   Patient presents with:  Cough: Patient present with cough, congestion and headache      HPI:   Patient comes as an urgent visit  C/C dr chew cough  C/O cough x 4 days -- hard to get it up   Unable to lie down tried 100 each 3   • Insulin Pen Needle (RELION PEN NEEDLES) 32G X 4 MM Does not apply Misc Use with injections 2 times daily 200 each 3   • Glucose Blood (CLAIRE CONTOUR NEXT TEST) In Vitro Strip Check 2 times daily 100 strip 6   • Lancets 30G Does not apply Mis maxillary sinus tenderness   NECK: supple,no adenopathy, nontender  LUNGS: clear to auscultation, no wheeze  CARDIO: RRR without murmur  EXTREMITIES: no cyanosis, or edema    ASSESSMENT AND PLAN:   Diagnoses and all orders for this visit:    Upper respirat March 2018–normal-- dr Paige Wilkins 12/19 --Rpt in one yr   Labs -viewed labs done in June 2019 in care everywhere and 10/19   Flu shot up-to-date         The patient indicates understanding of these issues and agrees to the plan.   No follow-ups on arnel

## 2019-12-21 ENCOUNTER — NURSE ONLY (OUTPATIENT)
Dept: INTERNAL MEDICINE CLINIC | Facility: CLINIC | Age: 44
End: 2019-12-21
Payer: COMMERCIAL

## 2019-12-21 VITALS — SYSTOLIC BLOOD PRESSURE: 135 MMHG | DIASTOLIC BLOOD PRESSURE: 86 MMHG | HEART RATE: 76 BPM

## 2019-12-21 DIAGNOSIS — I10 ESSENTIAL HYPERTENSION: Primary | ICD-10-CM

## 2019-12-21 NOTE — PROGRESS NOTES
Pt presents for Blood pressure check . Pt stopped Lisinopril per Dr Sheila Ware order for possible reaction to med Hoarseness and Cough. Pt waited in the room about 5 minutes before checking B/P reading obtained was  137/92 left arm Pulse 86 .  Will wait abo

## 2019-12-23 ENCOUNTER — OFFICE VISIT (OUTPATIENT)
Dept: SURGERY | Facility: CLINIC | Age: 44
End: 2019-12-23
Payer: COMMERCIAL

## 2019-12-23 VITALS
BODY MASS INDEX: 42.85 KG/M2 | DIASTOLIC BLOOD PRESSURE: 80 MMHG | SYSTOLIC BLOOD PRESSURE: 130 MMHG | HEIGHT: 64 IN | WEIGHT: 251 LBS

## 2019-12-23 DIAGNOSIS — E55.9 VITAMIN D DEFICIENCY: ICD-10-CM

## 2019-12-23 DIAGNOSIS — Z51.81 ENCOUNTER FOR THERAPEUTIC DRUG MONITORING: ICD-10-CM

## 2019-12-23 DIAGNOSIS — E78.00 HYPERCHOLESTEREMIA: Primary | ICD-10-CM

## 2019-12-23 DIAGNOSIS — Z86.79 HISTORY OF HYPERTENSION: ICD-10-CM

## 2019-12-23 DIAGNOSIS — E66.01 MORBID OBESITY WITH BMI OF 40.0-44.9, ADULT (HCC): ICD-10-CM

## 2019-12-23 PROCEDURE — 99214 OFFICE O/P EST MOD 30 MIN: CPT | Performed by: NURSE PRACTITIONER

## 2019-12-23 RX ORDER — PHENTERMINE HYDROCHLORIDE 15 MG/1
15 CAPSULE ORAL EVERY MORNING
Qty: 30 CAPSULE | Refills: 1 | Status: SHIPPED | OUTPATIENT
Start: 2019-12-23 | End: 2020-01-29 | Stop reason: DRUGHIGH

## 2019-12-23 NOTE — PROGRESS NOTES
[address] [facility]  [address] [address]  Dept: [phone]     Patient:  Eileen Moreira  :      1975  MRN:      [id number]    Chief Complaint:  Patient presents with:   Follow - Up: Weigh mg total) by mouth every morning before breakfast. 90 tablet 1   • Amoxicillin-Pot Clavulanate 875-125 MG Oral Tab Take 1 tablet by mouth 2 (two) times daily for 10 days.  20 tablet 0   • NOVOLOG 100 UNIT/ML Subcutaneous Solution INJECT 20 UNITS INTO THE SK activity: Not on file    Lifestyle      Physical activity:        Days per week: Not on file        Minutes per session: Not on file      Stress: Not on file    Relationships      Social connections:        Talks on phone: Not on file        Gets together: exercising? no   · Type of exercise?  None     Eating Habits  · Patient states the following:  · Eats 2 meal(s) per day  · Length of time it takes to consume a meal:  20  · # of snacks per day: 1 Type of snacks:  Chips  · Amount of soda consumption per day: Satya.  10/26/19 a1c 10.4. HYPERCHOLESTEROLEMIA:  Mild elevation.     Lab Results   Component Value Date/Time    CHOLEST 165 06/22/2018 10:40 AM     (H) 06/22/2018 10:40 AM    HDL 39 06/22/2018 10:40 AM    TRIG 67 06/22/2018 10:40 AM       Nancy anxiety among others. She understands that I will not call in the prescription for her; she has to have an appointment to have the medication refilled. Must avoid pregnancy during use, counseled on adequate contraception during use.  Patient states Elni

## 2019-12-24 NOTE — PROGRESS NOTES
Called Pt to f/u on the cough  . Pt is not available left message with Pt's daughter to return the call to our clinic . Pt needs an appointment if the cough has not cleared .

## 2019-12-24 NOTE — PROGRESS NOTES
Pt called back states cough is better now she is just experiencing hoarseness . Pt declined appointment at this time states she will call back by next wk if not improving .

## 2019-12-26 ENCOUNTER — TELEPHONE (OUTPATIENT)
Dept: ENDOCRINOLOGY CLINIC | Facility: CLINIC | Age: 44
End: 2019-12-26

## 2020-01-02 ENCOUNTER — APPOINTMENT (OUTPATIENT)
Dept: LAB | Facility: HOSPITAL | Age: 45
End: 2020-01-02
Attending: INTERNAL MEDICINE
Payer: COMMERCIAL

## 2020-01-02 ENCOUNTER — OFFICE VISIT (OUTPATIENT)
Dept: INTERNAL MEDICINE CLINIC | Facility: CLINIC | Age: 45
End: 2020-01-02
Payer: COMMERCIAL

## 2020-01-02 ENCOUNTER — HOSPITAL ENCOUNTER (OUTPATIENT)
Dept: GENERAL RADIOLOGY | Facility: HOSPITAL | Age: 45
Discharge: HOME | End: 2020-01-02
Attending: INTERNAL MEDICINE
Payer: COMMERCIAL

## 2020-01-02 VITALS
HEIGHT: 64 IN | BODY MASS INDEX: 43.05 KG/M2 | TEMPERATURE: 99 F | SYSTOLIC BLOOD PRESSURE: 131 MMHG | WEIGHT: 252.19 LBS | HEART RATE: 90 BPM | DIASTOLIC BLOOD PRESSURE: 85 MMHG | OXYGEN SATURATION: 95 %

## 2020-01-02 DIAGNOSIS — E78.00 HYPERCHOLESTEREMIA: ICD-10-CM

## 2020-01-02 DIAGNOSIS — E55.9 VITAMIN D DEFICIENCY: ICD-10-CM

## 2020-01-02 DIAGNOSIS — I10 ESSENTIAL HYPERTENSION: ICD-10-CM

## 2020-01-02 DIAGNOSIS — J40 BRONCHITIS: Primary | ICD-10-CM

## 2020-01-02 DIAGNOSIS — J40 BRONCHITIS: ICD-10-CM

## 2020-01-02 DIAGNOSIS — Z51.81 ENCOUNTER FOR THERAPEUTIC DRUG MONITORING: ICD-10-CM

## 2020-01-02 DIAGNOSIS — E11.9 DIABETES MELLITUS TYPE 2 WITHOUT RETINOPATHY (HCC): ICD-10-CM

## 2020-01-02 DIAGNOSIS — Z86.79 HISTORY OF HYPERTENSION: ICD-10-CM

## 2020-01-02 DIAGNOSIS — E66.01 MORBID OBESITY WITH BMI OF 40.0-44.9, ADULT (HCC): ICD-10-CM

## 2020-01-02 LAB
ALBUMIN SERPL-MCNC: 3.5 G/DL (ref 3.4–5)
ALBUMIN/GLOB SERPL: 0.9 {RATIO} (ref 1–2)
ALP LIVER SERPL-CCNC: 87 U/L (ref 37–98)
ALT SERPL-CCNC: 41 U/L (ref 13–56)
ANION GAP SERPL CALC-SCNC: 4 MMOL/L (ref 0–18)
AST SERPL-CCNC: 31 U/L (ref 15–37)
BILIRUB SERPL-MCNC: 0.5 MG/DL (ref 0.1–2)
BUN BLD-MCNC: 10 MG/DL (ref 7–18)
BUN/CREAT SERPL: 12 (ref 10–20)
CALCIUM BLD-MCNC: 8.5 MG/DL (ref 8.5–10.1)
CHLORIDE SERPL-SCNC: 106 MMOL/L (ref 98–112)
CO2 SERPL-SCNC: 27 MMOL/L (ref 21–32)
CREAT BLD-MCNC: 0.83 MG/DL (ref 0.55–1.02)
GLOBULIN PLAS-MCNC: 4 G/DL (ref 2.8–4.4)
GLUCOSE BLD-MCNC: 166 MG/DL (ref 70–99)
M PROTEIN MFR SERPL ELPH: 7.5 G/DL (ref 6.4–8.2)
OSMOLALITY SERPL CALC.SUM OF ELEC: 287 MOSM/KG (ref 275–295)
PATIENT FASTING Y/N/NP: NO
POTASSIUM SERPL-SCNC: 3.9 MMOL/L (ref 3.5–5.1)
SODIUM SERPL-SCNC: 137 MMOL/L (ref 136–145)

## 2020-01-02 PROCEDURE — 71046 X-RAY EXAM CHEST 2 VIEWS: CPT | Performed by: INTERNAL MEDICINE

## 2020-01-02 PROCEDURE — 93010 ELECTROCARDIOGRAM REPORT: CPT | Performed by: NURSE PRACTITIONER

## 2020-01-02 PROCEDURE — 80053 COMPREHEN METABOLIC PANEL: CPT

## 2020-01-02 PROCEDURE — 93005 ELECTROCARDIOGRAM TRACING: CPT

## 2020-01-02 PROCEDURE — 99214 OFFICE O/P EST MOD 30 MIN: CPT | Performed by: INTERNAL MEDICINE

## 2020-01-02 PROCEDURE — 36415 COLL VENOUS BLD VENIPUNCTURE: CPT

## 2020-01-02 RX ORDER — PREDNISONE 10 MG/1
TABLET ORAL
Qty: 20 TABLET | Refills: 0 | Status: SHIPPED | OUTPATIENT
Start: 2020-01-02 | End: 2020-01-29 | Stop reason: ALTCHOICE

## 2020-01-02 NOTE — PROGRESS NOTES
Kandyrylie Brumfield is a 45year old female.   Patient presents with:  Hoarseness: Hoarseness / Cough       HPI:   Pt comes as an urgent visit  C/c coughing   C/o feels like she can bring up her cough and spit outhte mucus   Finished the abx and didn't like t 32G X 4 MM Does not apply Misc Inject 2 times daily 100 each 3   • Insulin Pen Needle (RELION PEN NEEDLES) 32G X 4 MM Does not apply Misc Use with injections 2 times daily 200 each 3   • Glucose Blood (CLAIRE CONTOUR NEXT TEST) In Vitro Strip Check 2 times distress  SKIN: no rashes,no suspicious lesions  HEENT: atraumatic, normocephalic,ears- canal red but TM is ok  and throat are clear, + mild frontal sinus tenderness no maxillary sinus tenderness  NECK: supple,no adenopathy, nontender,+ acanthosis nigrican file.

## 2020-01-17 ENCOUNTER — MED REC SCAN ONLY (OUTPATIENT)
Dept: INTERNAL MEDICINE CLINIC | Facility: CLINIC | Age: 45
End: 2020-01-17

## 2020-01-23 ENCOUNTER — PATIENT MESSAGE (OUTPATIENT)
Dept: INTERNAL MEDICINE CLINIC | Facility: CLINIC | Age: 45
End: 2020-01-23

## 2020-01-23 DIAGNOSIS — E11.9 DIABETES MELLITUS TYPE 2 WITHOUT RETINOPATHY (HCC): Primary | ICD-10-CM

## 2020-01-23 RX ORDER — INSULIN LISPRO 100 [IU]/ML
20 INJECTION, SOLUTION INTRAVENOUS; SUBCUTANEOUS
Qty: 60 ML | Refills: 1 | Status: SHIPPED | OUTPATIENT
Start: 2020-01-23 | End: 2020-03-09

## 2020-01-23 RX ORDER — INSULIN ASPART 100 [IU]/ML
INJECTION, SOLUTION INTRAVENOUS; SUBCUTANEOUS
Refills: 0 | Status: CANCELLED | OUTPATIENT
Start: 2020-01-23

## 2020-01-23 RX ORDER — PEN NEEDLE, DIABETIC 31 G X1/4"
NEEDLE, DISPOSABLE MISCELLANEOUS
Qty: 400 EACH | Refills: 1 | Status: SHIPPED | OUTPATIENT
Start: 2020-01-23 | End: 2020-08-13

## 2020-01-23 NOTE — TELEPHONE ENCOUNTER
Please see patient MyChart message and message taken by CSS below. Rx for Humalog pended for review/approval with same instructions as Novolog Rx.

## 2020-01-23 NOTE — TELEPHONE ENCOUNTER
[employer] message sent. From: Marian Harris  To:  Ania Gupta MD  Sent: 1/23/2020 12:29 PM CST  Subject: Prescription Question    I need a new prescription for the pen needles   31×6mm    They are giving me humalog pens not vials

## 2020-01-23 NOTE — TELEPHONE ENCOUNTER
Patient stated that she uses 4 pen needles daily, injects 4 times a day. Pharmacy confirmed. No other needs at this time.

## 2020-01-23 NOTE — TELEPHONE ENCOUNTER
The patient will not be receiving vial for her insulin. Left message to call back please transfer to triage. A [email or web address] eGifter[employer] message was also sent. How many time will you inject?    From what I can see is 4 times total    From patient review of clinical info

## 2020-01-29 ENCOUNTER — OFFICE VISIT (OUTPATIENT)
Dept: SURGERY | Facility: CLINIC | Age: 45
End: 2020-01-29
Payer: COMMERCIAL

## 2020-01-29 VITALS
BODY MASS INDEX: 43.19 KG/M2 | WEIGHT: 253 LBS | HEIGHT: 64 IN | OXYGEN SATURATION: 99 % | HEART RATE: 98 BPM | DIASTOLIC BLOOD PRESSURE: 74 MMHG | SYSTOLIC BLOOD PRESSURE: 120 MMHG

## 2020-01-29 DIAGNOSIS — Z51.81 ENCOUNTER FOR THERAPEUTIC DRUG MONITORING: ICD-10-CM

## 2020-01-29 DIAGNOSIS — E66.01 MORBID OBESITY WITH BMI OF 40.0-44.9, ADULT (HCC): ICD-10-CM

## 2020-01-29 DIAGNOSIS — Z86.79 HISTORY OF HYPERTENSION: Primary | ICD-10-CM

## 2020-01-29 DIAGNOSIS — E55.9 VITAMIN D DEFICIENCY: ICD-10-CM

## 2020-01-29 DIAGNOSIS — E78.00 HYPERCHOLESTEREMIA: ICD-10-CM

## 2020-01-29 PROCEDURE — 99214 OFFICE O/P EST MOD 30 MIN: CPT | Performed by: NURSE PRACTITIONER

## 2020-01-29 RX ORDER — PHENTERMINE HYDROCHLORIDE 30 MG/1
30 CAPSULE ORAL EVERY MORNING
Qty: 30 CAPSULE | Refills: 1 | Status: SHIPPED | OUTPATIENT
Start: 2020-01-29 | End: 2020-09-14

## 2020-01-29 NOTE — PROGRESS NOTES
[facility]  [address] [facility]  [address] [address]  Dept: [phone]     Patient:  Amna Dominguez  :      1975  MRN:      [id number]    Chief Complaint:  Patient presents with:   Follow - Up  Weigh kg)      Patient Medications:    Current Outpatient Medications   Medication Sig Dispense Refill   • Phentermine HCl 30 MG Oral Cap Take 1 capsule (30 mg total) by mouth every morning.  30 capsule 1   • Insulin Lispro (HUMALOG) 100 UNIT/ML Subcutaneous Solu Inability: Not on file      Transportation needs:        Medical: Not on file        Non-medical: Not on file    Tobacco Use      Smoking status: Never Smoker      Smokeless tobacco: Never Used    Substance and Sexual Activity      Alcohol use: No      Luis M smoker   • Macular degeneration Neg    • Glaucoma Neg        Food Journal  · Reviewed and Discussed:       · Patient has a Food Journal?: no   · Patient is reading nutrition labels?   no  · Average Caloric Intake: Not tracking    · Average CHO Intake: Not t no organomegaly  Extremities: extremities normal, atraumatic, no cyanosis or edema  Skin: acanthosis nigricans behind neck    ASSESSMENT     DIABETES:  The patient's blood sugars were reviewed with the patient.   The patient denies any episodes of hypoglyce maintenance. Avoid processed and poor quality carbohydrates, refined grains, flour, sugar.   Denies hx cardiac disease or substance abuse    Continue phentermine, increase to 30 mg by mouth in the AM- good RX    Discussed risks, benefits, rationale, and si

## 2020-01-29 NOTE — PATIENT INSTRUCTIONS
Goals:     Hydration: Aim for 64 oz of water a day. Vegetables: 2 non-starchy vegetables with dinner.      Increase phentermine to 30 mg in the AM    Meet with Dr. Melinda Salcedo

## 2020-02-17 ENCOUNTER — OFFICE VISIT (OUTPATIENT)
Dept: ENDOCRINOLOGY CLINIC | Facility: CLINIC | Age: 45
End: 2020-02-17
Payer: COMMERCIAL

## 2020-02-17 VITALS
HEART RATE: 96 BPM | BODY MASS INDEX: 44 KG/M2 | WEIGHT: 256 LBS | DIASTOLIC BLOOD PRESSURE: 86 MMHG | SYSTOLIC BLOOD PRESSURE: 131 MMHG

## 2020-02-17 DIAGNOSIS — E11.65 UNCONTROLLED TYPE 2 DIABETES MELLITUS WITH HYPERGLYCEMIA (HCC): Primary | ICD-10-CM

## 2020-02-17 LAB
CARTRIDGE LOT#: ABNORMAL NUMERIC
GLUCOSE BLOOD: 193
HEMOGLOBIN A1C: 9.3 % (ref 4.3–5.6)
TEST STRIP LOT #: NORMAL NUMERIC

## 2020-02-17 PROCEDURE — 36416 COLLJ CAPILLARY BLOOD SPEC: CPT | Performed by: INTERNAL MEDICINE

## 2020-02-17 PROCEDURE — 99214 OFFICE O/P EST MOD 30 MIN: CPT | Performed by: INTERNAL MEDICINE

## 2020-02-17 PROCEDURE — 82962 GLUCOSE BLOOD TEST: CPT | Performed by: INTERNAL MEDICINE

## 2020-02-17 PROCEDURE — 83036 HEMOGLOBIN GLYCOSYLATED A1C: CPT | Performed by: INTERNAL MEDICINE

## 2020-02-17 RX ORDER — FLUCONAZOLE 150 MG/1
150 TABLET ORAL WEEKLY
Qty: 4 TABLET | Refills: 3 | Status: SHIPPED | OUTPATIENT
Start: 2020-02-17 | End: 2020-05-01

## 2020-02-17 NOTE — PROGRESS NOTES
Name: Lissy Finley  Date: 2/17/2020    Referring Physician: No ref. provider found    HISTORY OF PRESENT ILLNESS   Lissy Finley is a 45year old female who presents for diabetes mellitus, diagnosed in 2008.   She has been lost to follow up for ove mouth every morning., Disp: 30 capsule, Rfl: 1  •  Insulin Lispro (HUMALOG) 100 UNIT/ML Subcutaneous Solution, Inject 20 Units into the skin 3 (three) times daily before meals. , Disp: 60 mL, Rfl: 1  •  Insulin Pen Needle (PEN NEEDLES) 31G X 6 MM Does not a Other Topics      Concerns:        Caffeine Concern: Yes          tea; 2 cups a week.       Medical History:   Past Medical History:   Diagnosis Date   • Diabetes mellitus ([facility] no BDR  7/24/2014   • Diabetic retinopathy ([facility] 2008   • Myopia of both eyes w benefits  -Provided titration instructons for medications  -Start Invokana 300mg PO daily, verbalized understanding of risks and benefits   -Provide script for diflucan to prevent yeast infections   -Continue Glimepiride 4mg PO daily  -No Metformin therapy

## 2020-02-17 NOTE — PATIENT INSTRUCTIONS
Continue Lantus 80 units SQ daily    STOP Humalog    START Ozempic 0.25mg SQ weekly for 3 weeks then increase to 0.5mg SQ weekly for 3 weeks then increase to 1mg SQ weekly    START Invokana 300mg daily    START Diflucan 150mg PO weekly

## 2020-02-20 ENCOUNTER — TELEPHONE (OUTPATIENT)
Dept: ENDOCRINOLOGY CLINIC | Facility: CLINIC | Age: 45
End: 2020-02-20

## 2020-02-20 NOTE — TELEPHONE ENCOUNTER
•  Canagliflozin (INVOKANA) 300 MG Oral Tab, Take 300 mg by mouth daily. , Disp: 30 tablet, Rfl: 5    [employer]  Key: [id number]  Patient Last Name: Brigid Abel  : 1975    •  Semaglutide,0.25 or 0.5MG/DOS, (OZEMPIC, 0.25 OR 0.5 MG/DOSE,) 2 MG/1.

## 2020-02-20 NOTE — TELEPHONE ENCOUNTER
[facility] is missing the ICD 10 dx on rx - please resend to fax# [phone]. For additional questions please call. Thank you.

## 2020-02-21 NOTE — TELEPHONE ENCOUNTER
Dr. Destiny Castro,     Please clarify diagnosis on  02/17/20 chart note - states she has type 1 diabetes at the top but at the assessment/plan section states type 2. We need to send notes to Melany Orozco for prior auth on her medications. Thank you.

## 2020-02-21 NOTE — TELEPHONE ENCOUNTER
Called [facility] back and they seem to not know what RN was calling for. Questionnaire completed on the phone. RN confirmed they received notes we faxed.

## 2020-02-21 NOTE — TELEPHONE ENCOUNTER
Request Reference Number: [id number]. Vanesa Oconnor 2/1.5ML is approved through 02/20/2021. For further questions, call [phone]. Request Reference Number: [id number]. HUMBERTO TAB 300MG is approved through 02/20/2021.  For further questions, call (

## 2020-03-09 ENCOUNTER — OFFICE VISIT (OUTPATIENT)
Dept: SURGERY | Facility: CLINIC | Age: 45
End: 2020-03-09
Payer: COMMERCIAL

## 2020-03-09 DIAGNOSIS — G56.03 BILATERAL CARPAL TUNNEL SYNDROME: ICD-10-CM

## 2020-03-09 DIAGNOSIS — G56.02 CARPAL TUNNEL SYNDROME, LEFT: Primary | ICD-10-CM

## 2020-03-09 PROCEDURE — 99243 OFF/OP CNSLTJ NEW/EST LOW 30: CPT | Performed by: PLASTIC SURGERY

## 2020-03-09 RX ORDER — CANAGLIFLOZIN 300 MG/1
TABLET, FILM COATED ORAL
COMMUNITY
Start: 2020-02-24 | End: 2020-05-01

## 2020-03-09 RX ORDER — PHENTERMINE HYDROCHLORIDE 30 MG/1
CAPSULE ORAL
COMMUNITY
Start: 2020-01-29 | End: 2020-06-12 | Stop reason: DRUGHIGH

## 2020-03-09 RX ORDER — SEMAGLUTIDE 1.34 MG/ML
INJECTION, SOLUTION SUBCUTANEOUS
COMMUNITY
Start: 2020-02-24 | End: 2020-05-01

## 2020-03-09 NOTE — H&P
Amna Dominguez is a 45year old female that presents with Patient presents with:  Carpal Tunnel Syndrome: Left hand   .     REFERRED BY:  Nagiial Block     Pacemaker: No  Latex Allergy: no  Coumadin: No  Plavix: No  Other anticoagulants: No  Cardiac stents No pertinent surgical history.      ALLERIGIES  No Known Allergies     MEDICATIONS  Current Outpatient Medications   Medication Sig Dispense Refill   • INVOKANA 300 MG Oral Tab      • OZEMPIC, 0.25 OR 0.5 MG/DOSE, 2 MG/1.5ML Subcutaneous Solution Pen-inject lancets for contour 100 each 6   • Insulin Syringe 30G X 5/16\" 1 ML Does not apply Misc Use with insulin 4 times per day 150 each 0        SOCIAL HISTORY  Social History    Tobacco Use      Smoking status: Never Smoker      Smokeless tobacco: Never Used tunnel syndrome is including treatment options. The patient  may not have relief of symptoms with treatment. This is a moderate carpal tunnel syndrome.     Because of the severity (see Severity Index) of this carpal tunnel, non-operative treatment i

## 2020-05-01 ENCOUNTER — PATIENT MESSAGE (OUTPATIENT)
Dept: ENDOCRINOLOGY CLINIC | Facility: CLINIC | Age: 45
End: 2020-05-01

## 2020-05-01 ENCOUNTER — TELEMEDICINE (OUTPATIENT)
Dept: ENDOCRINOLOGY CLINIC | Facility: CLINIC | Age: 45
End: 2020-05-01
Payer: COMMERCIAL

## 2020-05-01 DIAGNOSIS — E11.9 DIABETES MELLITUS TYPE 2 WITHOUT RETINOPATHY (HCC): ICD-10-CM

## 2020-05-01 PROCEDURE — 99213 OFFICE O/P EST LOW 20 MIN: CPT | Performed by: INTERNAL MEDICINE

## 2020-05-01 RX ORDER — CANAGLIFLOZIN 300 MG/1
300 TABLET, FILM COATED ORAL DAILY
Qty: 90 TABLET | Refills: 1 | Status: SHIPPED | OUTPATIENT
Start: 2020-05-01 | End: 2020-08-13

## 2020-05-01 RX ORDER — FLUCONAZOLE 150 MG/1
150 TABLET ORAL WEEKLY
Qty: 4 TABLET | Refills: 3 | Status: SHIPPED | OUTPATIENT
Start: 2020-05-01 | End: 2020-08-13

## 2020-05-01 RX ORDER — GLIMEPIRIDE 4 MG/1
4 TABLET ORAL
Qty: 90 TABLET | Refills: 1 | Status: SHIPPED | OUTPATIENT
Start: 2020-05-01 | End: 2020-08-13

## 2020-05-01 NOTE — PROGRESS NOTES
Name: Julianna Lobato  Date: 5/1/2020    Please note that this visit was started with two way video but then converted to audio due to technical difficulties.      HISTORY OF PRESENT ILLNESS   Julianna Lobato is a 45year old female who presents for nasreen , Rfl:   •  OZEMPIC, 0.25 OR 0.5 MG/DOSE, 2 MG/1.5ML Subcutaneous Solution Pen-injector, , Disp: , Rfl:   •  Phentermine HCl 30 MG Oral Cap, , Disp: , Rfl:   •  Semaglutide,0.25 or 0.5MG/DOS, (OZEMPIC, 0.25 OR 0.5 MG/DOSE,) 2 MG/1.5ML Subcutaneous Solution 30G X 5/16\" 1 ML Does not apply Misc, Use with insulin 4 times per day, Disp: 150 each, Rfl: 0     Allergies:   No Known Allergies    Social History:   Social History    Socioeconomic History      Marital status:       Spouse name: Not on file cardiovascular disease  -Discussed importance of SBGM  -Discussed importance of low CHO diet  -Increase Lantus 80 units SQ daily   -Continue Ozempic 1.0mg SQ weekly, verbalized understanding of risks and benefits  -Continue Invokana 300mg PO daily, verbali

## 2020-05-04 NOTE — TELEPHONE ENCOUNTER
From: Janie Hayes  To: Zamzam Sandra MD  Sent: 5/1/2020 4:27 PM CDT  Subject: Prescription Question    [facility]id[facility] Paris said they did not receive an electronic script for the Ozempic.+

## 2020-05-04 NOTE — TELEPHONE ENCOUNTER
Receipt confirmed to pharmacy on 5/1 at 8:17am. Replied to patient asking if she received the rx. Will await response.

## 2020-06-12 ENCOUNTER — OFFICE VISIT (OUTPATIENT)
Dept: SURGERY | Facility: CLINIC | Age: 45
End: 2020-06-12
Payer: COMMERCIAL

## 2020-06-12 VITALS
HEART RATE: 90 BPM | BODY MASS INDEX: 40.48 KG/M2 | DIASTOLIC BLOOD PRESSURE: 91 MMHG | HEIGHT: 64 IN | SYSTOLIC BLOOD PRESSURE: 129 MMHG | OXYGEN SATURATION: 99 % | WEIGHT: 237.13 LBS

## 2020-06-12 DIAGNOSIS — E66.01 MORBID OBESITY WITH BMI OF 40.0-44.9, ADULT (HCC): ICD-10-CM

## 2020-06-12 DIAGNOSIS — E78.00 HYPERCHOLESTEREMIA: Primary | ICD-10-CM

## 2020-06-12 DIAGNOSIS — E11.9 DIABETES MELLITUS TYPE 2 WITHOUT RETINOPATHY (HCC): ICD-10-CM

## 2020-06-12 DIAGNOSIS — Z51.81 ENCOUNTER FOR THERAPEUTIC DRUG MONITORING: ICD-10-CM

## 2020-06-12 DIAGNOSIS — E55.9 VITAMIN D DEFICIENCY: ICD-10-CM

## 2020-06-12 DIAGNOSIS — Z86.79 HISTORY OF HYPERTENSION: ICD-10-CM

## 2020-06-12 PROCEDURE — 99214 OFFICE O/P EST MOD 30 MIN: CPT | Performed by: NURSE PRACTITIONER

## 2020-06-12 RX ORDER — PHENTERMINE HYDROCHLORIDE 37.5 MG/1
37.5 CAPSULE ORAL AS NEEDED
Qty: 30 CAPSULE | Refills: 0 | Status: SHIPPED | OUTPATIENT
Start: 2020-06-12

## 2020-06-12 NOTE — PATIENT INSTRUCTIONS
Ella Yeboah about developing a plant powered eating pattern. Whole Food, Plant Based; Mediterranean Diet; Paleo Diet    Whole Food Plant Based:  [place] over [facility]. Eat to Live. How Not to Diet. Blue Zones. Nutritionfacts. org  Food Revol

## 2020-06-12 NOTE — PROGRESS NOTES
[address] [facility]  [address] [address]  Dept: [phone]     Patient:  Lissy Finley  :      1975  MRN:      [id number]    Chief Complaint:  Patient presents with:   Follow - Up  Weigh Medications:    Current Outpatient Medications   Medication Sig Dispense Refill   • Phentermine HCl 37.5 MG Oral Cap Take 1 capsule (37.5 mg total) by mouth as needed. 30 capsule 0   • INVOKANA 300 MG Oral Tab Take 300 mg by mouth daily.  90 tablet 1   • Se Years of education: Not on file      Highest education level: Not on file    Occupational History      Not on file    Social Needs      Financial resource strain: Not on file      Food insecurity:        Worry: Not on file        Inability: Not on file sunburns in the past: No        Tanning Salons in the Past: No        Hx of Radiation Treatments: No        Regular use of sun block: Not Asked    Social History Narrative      Not on file    Surgical History:  History reviewed.  No pertinent surgical histo minutes to complete each meal    Exercise Goals Reviewed and Discussed    Continue to exercise as tolerated- 10 minutes daily- biking and walking     ROS:    Constitutional: positive for fatigue  Respiratory: negative  Cardiovascular: negative  Gastrointes Intolerant of Metformin. DYSLIPIDEMIA: Elevated. Recommend dietary changes and lifestyle modifications as discussed below. Monitor.        Lab Results   Component Value Date/Time    CHOLEST 165 06/22/2018 10:40 AM     (H) 06/22/2018 10:40 AM done 1/2/2020. Does not like fruit. Follow up with Dr. Nishant Live as recommended. Meet with RD. RTC one month. HANANE Castro    Visit Code: [id number].

## 2020-07-17 ENCOUNTER — OFFICE VISIT (OUTPATIENT)
Dept: SURGERY | Facility: CLINIC | Age: 45
End: 2020-07-17
Payer: COMMERCIAL

## 2020-07-17 VITALS
OXYGEN SATURATION: 99 % | WEIGHT: 234.88 LBS | BODY MASS INDEX: 40.1 KG/M2 | HEART RATE: 92 BPM | HEIGHT: 64 IN | SYSTOLIC BLOOD PRESSURE: 123 MMHG | DIASTOLIC BLOOD PRESSURE: 80 MMHG

## 2020-07-17 DIAGNOSIS — Z86.79 HISTORY OF HYPERTENSION: ICD-10-CM

## 2020-07-17 DIAGNOSIS — K59.00 CONSTIPATION, UNSPECIFIED CONSTIPATION TYPE: ICD-10-CM

## 2020-07-17 DIAGNOSIS — E55.9 VITAMIN D DEFICIENCY: ICD-10-CM

## 2020-07-17 DIAGNOSIS — Z51.81 ENCOUNTER FOR THERAPEUTIC DRUG MONITORING: ICD-10-CM

## 2020-07-17 DIAGNOSIS — E11.9 DIABETES MELLITUS TYPE 2 WITHOUT RETINOPATHY (HCC): ICD-10-CM

## 2020-07-17 DIAGNOSIS — E66.01 MORBID OBESITY WITH BMI OF 40.0-44.9, ADULT (HCC): ICD-10-CM

## 2020-07-17 DIAGNOSIS — E78.00 HYPERCHOLESTEREMIA: Primary | ICD-10-CM

## 2020-07-17 PROCEDURE — 3008F BODY MASS INDEX DOCD: CPT | Performed by: NURSE PRACTITIONER

## 2020-07-17 PROCEDURE — 3074F SYST BP LT 130 MM HG: CPT | Performed by: NURSE PRACTITIONER

## 2020-07-17 PROCEDURE — 99213 OFFICE O/P EST LOW 20 MIN: CPT | Performed by: NURSE PRACTITIONER

## 2020-07-17 PROCEDURE — 3079F DIAST BP 80-89 MM HG: CPT | Performed by: NURSE PRACTITIONER

## 2020-07-17 NOTE — PATIENT INSTRUCTIONS
1. Do a house cleanse, remove unhealthy foods from the house. 2. Aim to eat a whole, plant strong, low glycemic index diet. 3. Focus on the quality of your diet.   4. Get in the habit of recording everything you eat and drink using a food log- Los you are getting 64 oz of water daily. Please concentrate on getting adequate fiber in your diet (40 grams is recommended per day), which is found in fruits, vegetables, whole grains, and legumes (lentils, beans).      You can try prunes, apples, kiwi fo

## 2020-07-17 NOTE — PROGRESS NOTES
[address] [facility]  [address] [place] [id number]  Dept: [phone]     Patient:  Shannon Shaikh  :      1975  MRN:      [id number]    Chief Complaint:  Patient presents with:   Follow - Up  Weigh lb (116.1 kg)      Patient Medications:    Current Outpatient Medications   Medication Sig Dispense Refill   • Phentermine HCl 37.5 MG Oral Cap Take 1 capsule (37.5 mg total) by mouth as needed.  30 capsule 0   • INVOKANA 300 MG Oral Tab Take 300 mg by mout education: Not on file      Highest education level: Not on file    Occupational History      Not on file    Social Needs      Financial resource strain: Not on file      Food insecurity:        Worry: Not on file        Inability: Not on file      Transpo past: No        Tanning Salons in the Past: No        Hx of Radiation Treatments: No        Regular use of sun block: Not Asked    Social History Narrative      Not on file    Surgical History:  History reviewed. No pertinent surgical history.   Family Hist triggers for eating and manage cues and Eat slowly and take 20 to 30 minutes to complete each meal    Exercise Goals Reviewed and Discussed    Continue to exercise as tolerated- 10 minutes daily- biking and walking 2-3 days/week.      ROS:    Constitutional 6/12/2020. DIABETES: Continue current medications. Follow up with Dr. Glenroy Flores as planned. Per Dr. Talha Barnes note 5/1/2020: Intolerant of Metformin. DYSLIPIDEMIA: Elevated. Recommend dietary changes and lifestyle modifications as discussed below.  Johnie Wetzel to side effects- diarrhea. Trialed Victoza, but was too expensive in the past.  Trialed tresiba, but stopped due to back pain. Labs done 10/26/19, updated CMP essentially normal on 1/2/2020 other than elevated glucose. EKG done 1/2/2020.     Does not

## 2020-07-18 ENCOUNTER — APPOINTMENT (OUTPATIENT)
Dept: LAB | Facility: HOSPITAL | Age: 45
End: 2020-07-18
Attending: NURSE PRACTITIONER
Payer: COMMERCIAL

## 2020-07-18 DIAGNOSIS — K59.00 CONSTIPATION, UNSPECIFIED CONSTIPATION TYPE: ICD-10-CM

## 2020-07-18 DIAGNOSIS — Z86.79 HISTORY OF HYPERTENSION: ICD-10-CM

## 2020-07-18 DIAGNOSIS — E55.9 VITAMIN D DEFICIENCY: ICD-10-CM

## 2020-07-18 DIAGNOSIS — E78.00 HYPERCHOLESTEREMIA: ICD-10-CM

## 2020-07-18 DIAGNOSIS — E11.9 DIABETES MELLITUS TYPE 2 WITHOUT RETINOPATHY (HCC): ICD-10-CM

## 2020-07-18 DIAGNOSIS — E66.01 MORBID OBESITY WITH BMI OF 40.0-44.9, ADULT (HCC): ICD-10-CM

## 2020-07-18 DIAGNOSIS — Z51.81 ENCOUNTER FOR THERAPEUTIC DRUG MONITORING: ICD-10-CM

## 2020-07-18 LAB
ANION GAP SERPL CALC-SCNC: 3 MMOL/L (ref 0–18)
BUN BLD-MCNC: 11 MG/DL (ref 7–18)
BUN/CREAT SERPL: 13.3 (ref 10–20)
CALCIUM BLD-MCNC: 8.7 MG/DL (ref 8.5–10.1)
CHLORIDE SERPL-SCNC: 107 MMOL/L (ref 98–112)
CO2 SERPL-SCNC: 29 MMOL/L (ref 21–32)
CREAT BLD-MCNC: 0.83 MG/DL (ref 0.55–1.02)
EST. AVERAGE GLUCOSE BLD GHB EST-MCNC: 169 MG/DL (ref 68–126)
GLUCOSE BLD-MCNC: 119 MG/DL (ref 70–99)
HBA1C MFR BLD HPLC: 7.5 % (ref ?–5.7)
OSMOLALITY SERPL CALC.SUM OF ELEC: 289 MOSM/KG (ref 275–295)
PATIENT FASTING Y/N/NP: YES
POTASSIUM SERPL-SCNC: 3.6 MMOL/L (ref 3.5–5.1)
SODIUM SERPL-SCNC: 139 MMOL/L (ref 136–145)

## 2020-07-18 PROCEDURE — 83036 HEMOGLOBIN GLYCOSYLATED A1C: CPT

## 2020-07-18 PROCEDURE — 80048 BASIC METABOLIC PNL TOTAL CA: CPT

## 2020-07-18 PROCEDURE — 36415 COLL VENOUS BLD VENIPUNCTURE: CPT

## 2020-08-13 ENCOUNTER — OFFICE VISIT (OUTPATIENT)
Dept: ENDOCRINOLOGY CLINIC | Facility: CLINIC | Age: 45
End: 2020-08-13
Payer: COMMERCIAL

## 2020-08-13 VITALS
BODY MASS INDEX: 40 KG/M2 | DIASTOLIC BLOOD PRESSURE: 90 MMHG | HEART RATE: 86 BPM | SYSTOLIC BLOOD PRESSURE: 133 MMHG | WEIGHT: 234 LBS

## 2020-08-13 DIAGNOSIS — E11.9 DIABETES MELLITUS TYPE 2 WITHOUT RETINOPATHY (HCC): ICD-10-CM

## 2020-08-13 PROCEDURE — 3075F SYST BP GE 130 - 139MM HG: CPT | Performed by: INTERNAL MEDICINE

## 2020-08-13 PROCEDURE — 99213 OFFICE O/P EST LOW 20 MIN: CPT | Performed by: INTERNAL MEDICINE

## 2020-08-13 PROCEDURE — 3080F DIAST BP >= 90 MM HG: CPT | Performed by: INTERNAL MEDICINE

## 2020-08-13 RX ORDER — FLUCONAZOLE 150 MG/1
150 TABLET ORAL WEEKLY
Qty: 12 TABLET | Refills: 1 | Status: SHIPPED | OUTPATIENT
Start: 2020-08-13 | End: 2020-12-30

## 2020-08-13 RX ORDER — PEN NEEDLE, DIABETIC 31 G X1/4"
NEEDLE, DISPOSABLE MISCELLANEOUS
Qty: 400 EACH | Refills: 1 | Status: SHIPPED | OUTPATIENT
Start: 2020-08-13 | End: 2020-11-12

## 2020-08-13 RX ORDER — GLIMEPIRIDE 4 MG/1
4 TABLET ORAL
Qty: 90 TABLET | Refills: 1 | Status: SHIPPED | OUTPATIENT
Start: 2020-08-13 | End: 2020-11-12

## 2020-08-13 RX ORDER — CANAGLIFLOZIN 300 MG/1
300 TABLET, FILM COATED ORAL DAILY
Qty: 90 TABLET | Refills: 1 | Status: SHIPPED | OUTPATIENT
Start: 2020-08-13 | End: 2020-11-12

## 2020-08-13 NOTE — PROGRESS NOTES
Name: Jaida Oh  Date: 8/13/2020    Referring Physician: No ref. provider found    HISTORY OF PRESENT ILLNESS   Jaida Oh is a 39year old female who presents for diabetes mellitus, diagnosed in 2008.        Since last visit she has been fol MG/1.5ML Subcutaneous Solution Pen-injector, Inject 1 mg into the skin once a week., Disp: 9 mL, Rfl: 1  •  glimepiride 4 MG Oral Tab, Take 1 tablet (4 mg total) by mouth every morning before breakfast., Disp: 90 tablet, Rfl: 1  •  fluconazole (DIFLUCAN) 1 Sexual Activity      Alcohol use: No      Drug use: No    Other Topics      Concerns:        Caffeine Concern: Yes          tea; 2 cups a week.         Left Handed: Yes        Right Handed: No        Currently spends a great deal of time in the sun: No include retinopathy, neuropathy, nephropathy and cardiovascular disease  -Discussed importance of SBGM  -Discussed importance of low CHO diet  -Continue Lantus 70 units SQ daily   -Continue Ozempic 1.0mg SQ weekly, verbalized understanding of risks and shasha

## 2020-09-14 ENCOUNTER — OFFICE VISIT (OUTPATIENT)
Dept: SURGERY | Facility: CLINIC | Age: 45
End: 2020-09-14
Payer: COMMERCIAL

## 2020-09-14 VITALS
OXYGEN SATURATION: 98 % | BODY MASS INDEX: 40.24 KG/M2 | DIASTOLIC BLOOD PRESSURE: 74 MMHG | WEIGHT: 235.69 LBS | SYSTOLIC BLOOD PRESSURE: 126 MMHG | HEIGHT: 64 IN | HEART RATE: 64 BPM

## 2020-09-14 DIAGNOSIS — K59.00 CONSTIPATION, UNSPECIFIED CONSTIPATION TYPE: ICD-10-CM

## 2020-09-14 DIAGNOSIS — Z51.81 ENCOUNTER FOR THERAPEUTIC DRUG MONITORING: ICD-10-CM

## 2020-09-14 DIAGNOSIS — Z86.79 HISTORY OF HYPERTENSION: ICD-10-CM

## 2020-09-14 DIAGNOSIS — E55.9 VITAMIN D DEFICIENCY: ICD-10-CM

## 2020-09-14 DIAGNOSIS — E78.00 HYPERCHOLESTEREMIA: Primary | ICD-10-CM

## 2020-09-14 DIAGNOSIS — E11.9 DIABETES MELLITUS TYPE 2 WITHOUT RETINOPATHY (HCC): ICD-10-CM

## 2020-09-14 DIAGNOSIS — E66.01 MORBID OBESITY WITH BMI OF 40.0-44.9, ADULT (HCC): ICD-10-CM

## 2020-09-14 PROCEDURE — 3008F BODY MASS INDEX DOCD: CPT | Performed by: NURSE PRACTITIONER

## 2020-09-14 PROCEDURE — 3078F DIAST BP <80 MM HG: CPT | Performed by: NURSE PRACTITIONER

## 2020-09-14 PROCEDURE — 99214 OFFICE O/P EST MOD 30 MIN: CPT | Performed by: NURSE PRACTITIONER

## 2020-09-14 PROCEDURE — 3074F SYST BP LT 130 MM HG: CPT | Performed by: NURSE PRACTITIONER

## 2020-09-14 RX ORDER — TOPIRAMATE 25 MG/1
25 TABLET ORAL DAILY
Qty: 30 TABLET | Refills: 1 | Status: SHIPPED | OUTPATIENT
Start: 2020-09-14 | End: 2021-04-16 | Stop reason: ALTCHOICE

## 2020-09-14 NOTE — PATIENT INSTRUCTIONS
Start magnesium Life Extension- 1 capsule daily for leg cramps, sleep and constipation. Start topiramate 25 mg in the evening.

## 2020-10-19 ENCOUNTER — OFFICE VISIT (OUTPATIENT)
Dept: SURGERY | Facility: CLINIC | Age: 45
End: 2020-10-19
Payer: COMMERCIAL

## 2020-10-19 VITALS
DIASTOLIC BLOOD PRESSURE: 74 MMHG | HEART RATE: 82 BPM | SYSTOLIC BLOOD PRESSURE: 118 MMHG | OXYGEN SATURATION: 100 % | WEIGHT: 232.13 LBS | HEIGHT: 64 IN | BODY MASS INDEX: 39.63 KG/M2

## 2020-10-19 DIAGNOSIS — E78.00 HYPERCHOLESTEREMIA: Primary | ICD-10-CM

## 2020-10-19 DIAGNOSIS — Z51.81 ENCOUNTER FOR THERAPEUTIC DRUG MONITORING: ICD-10-CM

## 2020-10-19 DIAGNOSIS — E55.9 VITAMIN D DEFICIENCY: ICD-10-CM

## 2020-10-19 DIAGNOSIS — E66.9 OBESITY (BMI 30-39.9): ICD-10-CM

## 2020-10-19 DIAGNOSIS — K59.00 CONSTIPATION, UNSPECIFIED CONSTIPATION TYPE: ICD-10-CM

## 2020-10-19 DIAGNOSIS — E11.9 DIABETES MELLITUS TYPE 2 WITHOUT RETINOPATHY (HCC): ICD-10-CM

## 2020-10-19 DIAGNOSIS — Z86.79 HISTORY OF HYPERTENSION: ICD-10-CM

## 2020-10-19 PROCEDURE — 3074F SYST BP LT 130 MM HG: CPT | Performed by: NURSE PRACTITIONER

## 2020-10-19 PROCEDURE — 3078F DIAST BP <80 MM HG: CPT | Performed by: NURSE PRACTITIONER

## 2020-10-19 PROCEDURE — 3008F BODY MASS INDEX DOCD: CPT | Performed by: NURSE PRACTITIONER

## 2020-10-19 PROCEDURE — 99214 OFFICE O/P EST MOD 30 MIN: CPT | Performed by: NURSE PRACTITIONER

## 2020-10-19 RX ORDER — PHENTERMINE HYDROCHLORIDE 15 MG/1
15 CAPSULE ORAL EVERY MORNING
Qty: 30 CAPSULE | Refills: 1 | Status: SHIPPED | OUTPATIENT
Start: 2020-10-19 | End: 2020-12-09

## 2020-10-19 NOTE — PROGRESS NOTES
[address] [facility]  [address] [address]  Dept: [phone]     Patient:  Lilton Galeazzi  :      1975  MRN:      [id number]    Chief Complaint:  Patient presents with:   Follow - Up  Weigh lb (106.1 kg)      Patient Medications:    Current Outpatient Medications   Medication Sig Dispense Refill   • Phentermine HCl 15 MG Oral Cap Take 1 capsule (15 mg total) by mouth every morning.  30 capsule 1   • fluconazole (DIFLUCAN) 150 MG Oral Tab Take education level: Not on file    Occupational History      Not on file    Social Needs      Financial resource strain: Not on file      Food insecurity        Worry: Not on file        Inability: Not on file      Transportation needs        Medical: Not on Past: No        Hx of Radiation Treatments: No        Regular use of sun block: Not Asked    Social History Narrative      Not on file    Surgical History:  History reviewed. No pertinent surgical history.   Family History:    Family History   Problem Relat 30 minutes to complete each meal    Exercise Goals Reviewed and Discussed    Continue to exercise as tolerated- walking.     ROS:    Constitutional: positive for fatigue  Respiratory: negative  Cardiovascular: negative  Gastrointestinal: negative  Musculosk lifestyle modifications as discussed below. Monitor.        Lab Results   Component Value Date/Time    CHOLEST 165 06/22/2018 10:40 AM     (H) 06/22/2018 10:40 AM    HDL 39 06/22/2018 10:40 AM    TRIG 67 06/22/2018 10:40 AM     Vitamin D Deficiency: pain.     Labs done 10/26/19, updated CMP essentially normal on 1/2/2020 other than elevated glucose. EKG done 1/2/2020. Follow up with Dr. Elo Elkins as recommended. Meet with RD.      Complete a 10 Day Challenge by next visit- include one animal produ

## 2020-11-12 ENCOUNTER — OFFICE VISIT (OUTPATIENT)
Dept: ENDOCRINOLOGY CLINIC | Facility: CLINIC | Age: 45
End: 2020-11-12
Payer: COMMERCIAL

## 2020-11-12 ENCOUNTER — PATIENT MESSAGE (OUTPATIENT)
Dept: INTERNAL MEDICINE CLINIC | Facility: CLINIC | Age: 45
End: 2020-11-12

## 2020-11-12 VITALS
BODY MASS INDEX: 39 KG/M2 | SYSTOLIC BLOOD PRESSURE: 132 MMHG | HEART RATE: 84 BPM | WEIGHT: 229 LBS | DIASTOLIC BLOOD PRESSURE: 86 MMHG

## 2020-11-12 DIAGNOSIS — Z12.31 BREAST CANCER SCREENING BY MAMMOGRAM: Primary | ICD-10-CM

## 2020-11-12 DIAGNOSIS — E04.2 MULTINODULAR GOITER: ICD-10-CM

## 2020-11-12 DIAGNOSIS — E11.9 DIABETES MELLITUS TYPE 2 WITHOUT RETINOPATHY (HCC): Primary | ICD-10-CM

## 2020-11-12 PROCEDURE — 83036 HEMOGLOBIN GLYCOSYLATED A1C: CPT | Performed by: INTERNAL MEDICINE

## 2020-11-12 PROCEDURE — 3075F SYST BP GE 130 - 139MM HG: CPT | Performed by: INTERNAL MEDICINE

## 2020-11-12 PROCEDURE — 82962 GLUCOSE BLOOD TEST: CPT | Performed by: INTERNAL MEDICINE

## 2020-11-12 PROCEDURE — 99072 ADDL SUPL MATRL&STAF TM PHE: CPT | Performed by: INTERNAL MEDICINE

## 2020-11-12 PROCEDURE — 90686 IIV4 VACC NO PRSV 0.5 ML IM: CPT | Performed by: INTERNAL MEDICINE

## 2020-11-12 PROCEDURE — 36416 COLLJ CAPILLARY BLOOD SPEC: CPT | Performed by: INTERNAL MEDICINE

## 2020-11-12 PROCEDURE — 99214 OFFICE O/P EST MOD 30 MIN: CPT | Performed by: INTERNAL MEDICINE

## 2020-11-12 PROCEDURE — 3079F DIAST BP 80-89 MM HG: CPT | Performed by: INTERNAL MEDICINE

## 2020-11-12 PROCEDURE — 90471 IMMUNIZATION ADMIN: CPT | Performed by: INTERNAL MEDICINE

## 2020-11-12 RX ORDER — PEN NEEDLE, DIABETIC 31 G X1/4"
NEEDLE, DISPOSABLE MISCELLANEOUS
Qty: 400 EACH | Refills: 1 | Status: SHIPPED | OUTPATIENT
Start: 2020-11-12

## 2020-11-12 RX ORDER — BLOOD SUGAR DIAGNOSTIC
STRIP MISCELLANEOUS
Qty: 200 STRIP | Refills: 1 | Status: SHIPPED | OUTPATIENT
Start: 2020-11-12

## 2020-11-12 RX ORDER — INSULIN GLARGINE 100 [IU]/ML
80 INJECTION, SOLUTION SUBCUTANEOUS NIGHTLY
Qty: 30 ML | Refills: 3 | Status: SHIPPED | OUTPATIENT
Start: 2020-11-12 | End: 2021-03-11

## 2020-11-12 RX ORDER — GLIMEPIRIDE 4 MG/1
4 TABLET ORAL
Qty: 90 TABLET | Refills: 1 | Status: SHIPPED | OUTPATIENT
Start: 2020-11-12

## 2020-11-12 RX ORDER — CANAGLIFLOZIN 300 MG/1
300 TABLET, FILM COATED ORAL DAILY
Qty: 90 TABLET | Refills: 1 | Status: SHIPPED | OUTPATIENT
Start: 2020-11-12

## 2020-11-12 RX ORDER — LANCETS
EACH MISCELLANEOUS
Qty: 200 EACH | Refills: 1 | Status: SHIPPED | OUTPATIENT
Start: 2020-11-12

## 2020-11-12 RX ORDER — SEMAGLUTIDE 1.34 MG/ML
1 INJECTION, SOLUTION SUBCUTANEOUS WEEKLY
Qty: 9 ML | Refills: 1 | Status: SHIPPED | OUTPATIENT
Start: 2020-11-12 | End: 2021-03-11

## 2020-11-13 NOTE — TELEPHONE ENCOUNTER
From: Steve Fink  To: Carol Ovalle MD  Sent: 11/12/2020 8:08 PM CST  Subject: Other    I received a message saying that I due for a mammogram is that true?

## 2020-11-13 NOTE — PROGRESS NOTES
Name: Salas Rutherford  Date: 11/12/2020    Referring Physician: No ref. provider found    HISTORY OF PRESENT ILLNESS   Salas Rutherford is a 39year old female who presents for diabetes mellitus, diagnosed in 2008.        Since last visit she has been fo Reported on 10/19/2020 ), Disp: 30 tablet, Rfl: 1  •  fluconazole (DIFLUCAN) 150 MG Oral Tab, Take 1 tablet (150 mg total) by mouth once a week., Disp: 12 tablet, Rfl: 1  •  INVOKANA 300 MG Oral Tab, Take 300 mg by mouth daily. , Disp: 90 tablet, Rfl: 1  • Substance and Sexual Activity      Alcohol use: No      Drug use: No    Other Topics      Concerns:        Caffeine Concern: Yes          tea; 2 cups a week.         Left Handed: Yes        Right Handed: No        Currently spends a great deal of time in th importance of low CHO diet  -She notes significant increase in stress plus missing glimepiride dose likely leading to higher sugars   -Continue Lantus 70 units SQ daily   -Continue Ozempic 1.0mg SQ weekly, verbalized understanding of risks and benefits  -C

## 2020-11-14 ENCOUNTER — TELEPHONE (OUTPATIENT)
Dept: ENDOCRINOLOGY CLINIC | Facility: CLINIC | Age: 45
End: 2020-11-14

## 2020-11-14 ENCOUNTER — LAB ENCOUNTER (OUTPATIENT)
Dept: LAB | Facility: HOSPITAL | Age: 45
End: 2020-11-14
Attending: INTERNAL MEDICINE
Payer: COMMERCIAL

## 2020-11-14 DIAGNOSIS — E11.9 DIABETES MELLITUS TYPE 2 WITHOUT RETINOPATHY (HCC): ICD-10-CM

## 2020-11-14 PROCEDURE — 80053 COMPREHEN METABOLIC PANEL: CPT

## 2020-11-14 PROCEDURE — 80061 LIPID PANEL: CPT

## 2020-11-14 PROCEDURE — 82570 ASSAY OF URINE CREATININE: CPT

## 2020-11-14 PROCEDURE — 84439 ASSAY OF FREE THYROXINE: CPT

## 2020-11-14 PROCEDURE — 82043 UR ALBUMIN QUANTITATIVE: CPT

## 2020-11-14 PROCEDURE — 36415 COLL VENOUS BLD VENIPUNCTURE: CPT

## 2020-11-14 PROCEDURE — 84443 ASSAY THYROID STIM HORMONE: CPT

## 2020-11-14 NOTE — TELEPHONE ENCOUNTER
Current Outpatient Medications   Medication Sig Dispense Refill   • INVOKANA 300 MG Oral Tab Take 300 mg by mouth daily. 90 tablet 1     Per pharmacy a Prior Auth is required for this med. Go.[facility]/login  Key:  [id number]    Current Outpatient M

## 2020-11-18 NOTE — TELEPHONE ENCOUNTER
PA completed for Invokana and Ozempic under keys below on covermymeds. Lantus also not covered. Please advise if ok to switch to [place] or [place]? Appears both preferred.

## 2020-11-19 RX ORDER — INSULIN DEGLUDEC INJECTION 100 U/ML
INJECTION, SOLUTION SUBCUTANEOUS
Qty: 30 ML | Refills: 2 | Status: SHIPPED | OUTPATIENT
Start: 2020-11-19 | End: 2020-11-19

## 2020-11-19 NOTE — TELEPHONE ENCOUNTER
rx sent to [facility] but rn called patient and states she picked up all her prescriptions already including Lantus  States she still have coverage via old insurance and all meds were covered.   States she tried Store[facility] before and did not work anyway  rx [facility]

## 2020-11-23 NOTE — TELEPHONE ENCOUNTER
Received Approval for Invokana 300mg. Approval is approved until 11/18/21. Received Approval for Ozempic. Approval is good until 11/18/21. 77.1

## 2020-12-08 ENCOUNTER — HOSPITAL ENCOUNTER (OUTPATIENT)
Dept: MAMMOGRAPHY | Facility: HOSPITAL | Age: 45
Discharge: HOME | End: 2020-12-08
Attending: INTERNAL MEDICINE
Payer: COMMERCIAL

## 2020-12-08 DIAGNOSIS — Z12.31 BREAST CANCER SCREENING BY MAMMOGRAM: ICD-10-CM

## 2020-12-08 PROCEDURE — 77063 BREAST TOMOSYNTHESIS BI: CPT | Performed by: INTERNAL MEDICINE

## 2020-12-08 PROCEDURE — 77067 SCR MAMMO BI INCL CAD: CPT | Performed by: INTERNAL MEDICINE

## 2020-12-09 ENCOUNTER — TELEMEDICINE (OUTPATIENT)
Dept: SURGERY | Facility: CLINIC | Age: 45
End: 2020-12-09

## 2020-12-09 VITALS — BODY MASS INDEX: 38.07 KG/M2 | WEIGHT: 223 LBS | HEIGHT: 64 IN

## 2020-12-09 DIAGNOSIS — E55.9 VITAMIN D DEFICIENCY: ICD-10-CM

## 2020-12-09 DIAGNOSIS — E66.9 OBESITY (BMI 30-39.9): ICD-10-CM

## 2020-12-09 DIAGNOSIS — Z51.81 ENCOUNTER FOR THERAPEUTIC DRUG MONITORING: ICD-10-CM

## 2020-12-09 DIAGNOSIS — E78.00 HYPERCHOLESTEREMIA: Primary | ICD-10-CM

## 2020-12-09 DIAGNOSIS — E11.9 DIABETES MELLITUS TYPE 2 WITHOUT RETINOPATHY (HCC): ICD-10-CM

## 2020-12-09 PROCEDURE — 3008F BODY MASS INDEX DOCD: CPT | Performed by: INTERNAL MEDICINE

## 2020-12-09 PROCEDURE — 99214 OFFICE O/P EST MOD 30 MIN: CPT | Performed by: INTERNAL MEDICINE

## 2020-12-09 RX ORDER — PHENTERMINE HYDROCHLORIDE 15 MG/1
15 CAPSULE ORAL EVERY MORNING
Qty: 30 CAPSULE | Refills: 1 | Status: SHIPPED | OUTPATIENT
Start: 2020-12-09

## 2020-12-09 RX ORDER — ERGOCALCIFEROL 1.25 MG/1
50000 CAPSULE ORAL WEEKLY
Qty: 12 CAPSULE | Refills: 0 | Status: SHIPPED | OUTPATIENT
Start: 2020-12-09 | End: 2021-04-22

## 2020-12-09 NOTE — PROGRESS NOTES
[facility]  [address] [address]  Dept: [phone]     Virtual Telephone Check-In    Debi Walker verbally consents to a Virtual/Telephone Check-In visit on 12/09/20.   Patient has bee yes and Snoring-Improvement?  yes    OBJECTIVE     Vitals: Ht 5' 4\" (1.626 m)   Wt 223 lb (101.2 kg)   LMP 12/06/2020 (Exact Date)   BMI 38.28 kg/m²     Initial weight loss: -09   Total weight loss: +17   Start weight: 206    Wt Readings from Last 3 Encou daily 100 strip 6   • Lancets 30G Does not apply Misc Check 2 times daily, lancets for contour 100 each 6   • Insulin Syringe 30G X 5/16\" 1 ML Does not apply Misc Use with insulin 4 times per day 150 each 0     Allergies:  Patient has no known allergies. Diet: Not Asked        Back Care: Not Asked        Exercise: Not Asked        Bike Helmet: Not Asked        Seat Belt: Not Asked        Self-Exams: Not Asked        Left Handed: Yes        Right Handed: No        Currently spends a great deal of time in th within 1 hour of waking  and Eat 3-4 cups of fresh fruits or vegetables daily    Behavior Modifications Reviewed and Discussed  Eat breakfast, Eat 3 meals per day, Plan meals in advance, Read nutrition labels, Drink 64 oz of water per day, Maintain a daily planned. Per Dr. Rosenda Darling note 5/1/2020: Intolerant of Metformin. DYSLIPIDEMIA: Elevated. Recommend dietary changes and lifestyle modifications as discussed below. Monitor.        Lab Results   Component Value Date/Time    CHOLEST 173 11/14/2020 09:21 side effects- diarrhea. -Trialed Victoza, but was too expensive in the past.  -Trialed tresiba, but stopped due to back pain.  -Avoid topiramate- caused side pain.      Labs done 10/26/19, updated CMP essentially normal on 1/2/2020 other than elevated gluc

## 2020-12-31 RX ORDER — FLUCONAZOLE 150 MG/1
150 TABLET ORAL WEEKLY
Qty: 12 TABLET | Refills: 0 | Status: SHIPPED | OUTPATIENT
Start: 2020-12-31 | End: 2021-03-11

## 2021-01-22 ENCOUNTER — TELEPHONE (OUTPATIENT)
Dept: ENDOCRINOLOGY CLINIC | Facility: CLINIC | Age: 46
End: 2021-01-22

## 2021-01-22 NOTE — TELEPHONE ENCOUNTER
Prior Authorization Renewal Request for    Ozempic (0.25 or 0.5mg/dose) 2mg/1.5ml pen-injectors    Go to:  [email or web address]    Key: [address]  Patient Last Name: Nick Minaya  : 1975      Invokana 300mg tablets    Key: [id number]    Please fol

## 2021-01-27 RX ORDER — INSULIN ASPART 100 [IU]/ML
12 INJECTION, SOLUTION INTRAVENOUS; SUBCUTANEOUS
Qty: 30 ML | Refills: 2 | Status: SHIPPED | OUTPATIENT
Start: 2021-01-27 | End: 2021-05-11 | Stop reason: ALTCHOICE

## 2021-01-27 NOTE — TELEPHONE ENCOUNTER
Patient called and reviewed advise per Dr. Manan Leon as listed. Patient agreeable with starting SA insulin once finished with ozempic and Invokana. New prescription sent to [facility] pharmacy per patient's request.     Patient denied having any questions.

## 2021-01-27 NOTE — TELEPHONE ENCOUNTER
Ok, noted. Unfortunately the alternative plan would be to start short acting insulin vs. 70/30. Ideally she should stay on the Lantus and add Novolog (or Humalog) 12 units subcutaneous TID with meals. Disp 30mL refill 2. Thanks.

## 2021-01-27 NOTE — TELEPHONE ENCOUNTER
Spoke to pharmacist Slim and states below medications do not need PA. They are going through, however, patient's plan has a very high deductible.      Ozempic: $850 for 30 days  Invokana: $542 for 30 days    RN spoke to patient and informed her of the abov

## 2021-01-29 NOTE — TELEPHONE ENCOUNTER
•  Semaglutide, 1 MG/DOSE, (OZEMPIC, 1 MG/DOSE,) 2 MG/1.5ML Subcutaneous Solution Pen-injector, Inject 1 mg into the skin once a week., Disp: 9 mL, Rfl: 1    Key:[id number]          •  INVOKANA 300 MG Oral Tab, Take 300 mg by mouth daily. , Disp: 90 tablet, R

## 2021-01-29 NOTE — TELEPHONE ENCOUNTER
Per documentation below pt has decided to start SA insulin instead. Rx has been sent. Called and notified the pharmacy.

## 2021-03-11 ENCOUNTER — OFFICE VISIT (OUTPATIENT)
Dept: ENDOCRINOLOGY CLINIC | Facility: CLINIC | Age: 46
End: 2021-03-11
Payer: COMMERCIAL

## 2021-03-11 VITALS
WEIGHT: 228 LBS | HEART RATE: 104 BPM | SYSTOLIC BLOOD PRESSURE: 114 MMHG | BODY MASS INDEX: 39 KG/M2 | OXYGEN SATURATION: 99 % | DIASTOLIC BLOOD PRESSURE: 70 MMHG

## 2021-03-11 DIAGNOSIS — E11.9 DIABETES MELLITUS TYPE 2 WITHOUT RETINOPATHY (HCC): Primary | ICD-10-CM

## 2021-03-11 DIAGNOSIS — E04.2 MULTINODULAR GOITER: ICD-10-CM

## 2021-03-11 LAB
CARTRIDGE LOT#: ABNORMAL NUMERIC
GLUCOSE BLOOD: 174
HEMOGLOBIN A1C: 7.3 % (ref 4.3–5.6)
TEST STRIP LOT #: NORMAL NUMERIC

## 2021-03-11 PROCEDURE — 3078F DIAST BP <80 MM HG: CPT | Performed by: INTERNAL MEDICINE

## 2021-03-11 PROCEDURE — 99214 OFFICE O/P EST MOD 30 MIN: CPT | Performed by: INTERNAL MEDICINE

## 2021-03-11 PROCEDURE — 3074F SYST BP LT 130 MM HG: CPT | Performed by: INTERNAL MEDICINE

## 2021-03-11 PROCEDURE — 82947 ASSAY GLUCOSE BLOOD QUANT: CPT | Performed by: INTERNAL MEDICINE

## 2021-03-11 PROCEDURE — 83036 HEMOGLOBIN GLYCOSYLATED A1C: CPT | Performed by: INTERNAL MEDICINE

## 2021-03-11 PROCEDURE — 36416 COLLJ CAPILLARY BLOOD SPEC: CPT | Performed by: INTERNAL MEDICINE

## 2021-03-11 RX ORDER — SEMAGLUTIDE 1.34 MG/ML
1 INJECTION, SOLUTION SUBCUTANEOUS WEEKLY
Qty: 9 ML | Refills: 1 | Status: SHIPPED | OUTPATIENT
Start: 2021-03-11 | End: 2021-03-23

## 2021-03-11 RX ORDER — PANTOPRAZOLE SODIUM 20 MG/1
40 TABLET, DELAYED RELEASE ORAL
Qty: 30 TABLET | Refills: 0 | Status: SHIPPED | OUTPATIENT
Start: 2021-03-11

## 2021-03-11 RX ORDER — INSULIN GLARGINE 100 [IU]/ML
64 INJECTION, SOLUTION SUBCUTANEOUS NIGHTLY
Qty: 30 ML | Refills: 3 | Status: SHIPPED | OUTPATIENT
Start: 2021-03-11 | End: 2021-09-02

## 2021-03-12 DIAGNOSIS — Z23 NEED FOR VACCINATION: ICD-10-CM

## 2021-03-12 RX ORDER — FLUCONAZOLE 150 MG/1
150 TABLET ORAL WEEKLY
Qty: 12 TABLET | Refills: 0 | Status: SHIPPED | OUTPATIENT
Start: 2021-03-12 | End: 2021-09-02

## 2021-03-12 NOTE — PROGRESS NOTES
Name: Janie Hayes  Date: 3/11/2021    Referring Physician: No ref. provider found    HISTORY OF PRESENT ILLNESS   Janie Hayes is a 39year old female who presents for diabetes mellitus, diagnosed in 2008.          Prior HbA, C or glycohemoglobin MG/DOSE,) 2 MG/1.5ML Subcutaneous Solution Pen-injector, Inject 1 mg into the skin once a week., Disp: 9 mL, Rfl: 1  •  INVOKANA 300 MG Oral Tab, Take 300 mg by mouth daily. , Disp: 90 tablet, Rfl: 1  •  glimepiride 4 MG Oral Tab, Take 1 tablet (4 mg total) History    Socioeconomic History      Marital status:       Spouse name: Not on file      Number of children: Not on file      Years of education: Not on file      Highest education level: Not on file    Tobacco Use      Smoking status: Never Smoker unspecified hyperlipidemia    • Stress    • Type II or unspecified type diabetes mellitus without mention of complication, not stated as uncontrolled        Surgical history:   History reviewed. No pertinent surgical history.       PHYSICAL EXAM  /70 Monica Holloway MD

## 2021-03-15 ENCOUNTER — TELEPHONE (OUTPATIENT)
Dept: ENDOCRINOLOGY CLINIC | Facility: CLINIC | Age: 46
End: 2021-03-15

## 2021-03-15 NOTE — TELEPHONE ENCOUNTER
•  Semaglutide, 1 MG/DOSE, (OZEMPIC, 1 MG/DOSE,) 2 MG/1.5ML Subcutaneous Solution Pen-injector, Inject 1 mg into the skin once a week., Disp: 9 mL, Rfl: 1    Key: [id number]

## 2021-03-16 ENCOUNTER — IMMUNIZATION (OUTPATIENT)
Dept: LAB | Age: 46
End: 2021-03-16
Attending: HOSPITALIST
Payer: MEDICAID

## 2021-03-16 DIAGNOSIS — Z23 NEED FOR VACCINATION: Primary | ICD-10-CM

## 2021-03-16 PROCEDURE — 0001A SARSCOV2 VAC 30MCG/0.3ML IM: CPT

## 2021-03-23 RX ORDER — LIRAGLUTIDE 6 MG/ML
1.8 INJECTION SUBCUTANEOUS DAILY
Qty: 27 ML | Refills: 0 | Status: SHIPPED | OUTPATIENT
Start: 2021-03-23

## 2021-03-23 NOTE — TELEPHONE ENCOUNTER
Received fax stating that Dirk Casarezbourne has been denied. Denial reason: \"The request was denied because other equally effective therapies are available without prior authorization\". Plan covers Byetta and Victoza.  Please advise if patient can use covered

## 2021-03-23 NOTE — TELEPHONE ENCOUNTER
Lets transition to victoza 1.2mg once daily in AM for 1 week, then increase dose to 1.8mg once daily in AM if no GI s/e. New prescription entered. Please update patient on this new change to her medications.       Thank you

## 2021-03-25 NOTE — TELEPHONE ENCOUNTER
Spoke to patient and relayed below message from [employer] as outlined. States she has taken victoza before but had to stop because of coverage issue also.   She asked how long she will be on the medication and RN informed her usually it will be long term but als

## 2021-04-06 ENCOUNTER — IMMUNIZATION (OUTPATIENT)
Dept: LAB | Age: 46
End: 2021-04-06
Attending: HOSPITALIST
Payer: MEDICAID

## 2021-04-06 DIAGNOSIS — Z23 NEED FOR VACCINATION: Primary | ICD-10-CM

## 2021-04-06 PROCEDURE — 0002A SARSCOV2 VAC 30MCG/0.3ML IM: CPT

## 2021-04-12 ENCOUNTER — HOSPITAL ENCOUNTER (OUTPATIENT)
Dept: ULTRASOUND IMAGING | Age: 46
Discharge: HOME | End: 2021-04-12
Attending: INTERNAL MEDICINE
Payer: MEDICAID

## 2021-04-12 DIAGNOSIS — E04.2 MULTINODULAR GOITER: ICD-10-CM

## 2021-04-12 PROCEDURE — 76536 US EXAM OF HEAD AND NECK: CPT | Performed by: INTERNAL MEDICINE

## 2021-04-15 ENCOUNTER — TELEPHONE (OUTPATIENT)
Dept: INTERNAL MEDICINE CLINIC | Facility: CLINIC | Age: 46
End: 2021-04-15

## 2021-04-22 DIAGNOSIS — E55.9 VITAMIN D DEFICIENCY: ICD-10-CM

## 2021-04-22 RX ORDER — ERGOCALCIFEROL 1.25 MG/1
CAPSULE ORAL
Qty: 12 CAPSULE | Refills: 0 | Status: SHIPPED | OUTPATIENT
Start: 2021-04-22

## 2021-04-24 ENCOUNTER — TELEPHONE (OUTPATIENT)
Dept: ENDOCRINOLOGY CLINIC | Facility: CLINIC | Age: 46
End: 2021-04-24

## 2021-04-24 NOTE — TELEPHONE ENCOUNTER
Prior Authorization request for:      •  Liraglutide (VICTOZA) 18 MG/3ML Subcutaneous Solution Pen-injector, Inject 1.8 mg into the skin daily. , Disp: 27 mL, Rfl: 0    KEY: [id number]    Please follow up, thank you.

## 2021-04-26 NOTE — TELEPHONE ENCOUNTER
Medication PA Requested: Victoza 18mg/3ml                                                        CoverMyMeds Used:  Yes  Key: [id number]  Sig: Inject 1.8 mg into the skin daily  DX Code: E11.9                                 CPT code (if applicable):   Case N

## 2021-04-27 NOTE — TELEPHONE ENCOUNTER
Medication PA Requested: Victoza 18mg/3ml                                                        CoverMyMeds Used:  Yes  Key: [id number]  Sig: Inject 1.8 mg into the skin daily  DX Code: E11.9            Submitted with Office note/A1c op 3/11/21 and phone not

## 2021-04-29 NOTE — TELEPHONE ENCOUNTER
I phone [employer] [phone], clincial review, spoke with [facility]. States Victoza PA does not appear in Cover My Meds on her end. Gave her the KEY of [id number]  and she states not pulling up with key.  She checked with clinical team and states no P

## 2021-05-04 ENCOUNTER — TELEPHONE (OUTPATIENT)
Dept: INTERNAL MEDICINE CLINIC | Facility: CLINIC | Age: 46
End: 2021-05-04

## 2021-05-04 NOTE — TELEPHONE ENCOUNTER
Patient states will need an updated vaccine record that shows everything current including most recent flu vaccine.  Please call when ready for , need by tomorrow, 5/5

## 2021-05-11 ENCOUNTER — LAB ENCOUNTER (OUTPATIENT)
Dept: LAB | Age: 46
End: 2021-05-11
Attending: INTERNAL MEDICINE
Payer: MEDICAID

## 2021-05-11 ENCOUNTER — OFFICE VISIT (OUTPATIENT)
Dept: INTERNAL MEDICINE CLINIC | Facility: CLINIC | Age: 46
End: 2021-05-11
Payer: MEDICAID

## 2021-05-11 VITALS
RESPIRATION RATE: 18 BRPM | DIASTOLIC BLOOD PRESSURE: 89 MMHG | BODY MASS INDEX: 39.09 KG/M2 | HEART RATE: 92 BPM | SYSTOLIC BLOOD PRESSURE: 130 MMHG | WEIGHT: 229 LBS | HEIGHT: 64 IN

## 2021-05-11 DIAGNOSIS — Z00.00 PHYSICAL EXAM, ANNUAL: ICD-10-CM

## 2021-05-11 DIAGNOSIS — Z00.00 PHYSICAL EXAM, ANNUAL: Primary | ICD-10-CM

## 2021-05-11 DIAGNOSIS — R01.1 SYSTOLIC MURMUR: ICD-10-CM

## 2021-05-11 DIAGNOSIS — Z01.419 ENCOUNTER FOR ROUTINE GYNECOLOGICAL EXAMINATION WITH PAPANICOLAOU SMEAR OF CERVIX: ICD-10-CM

## 2021-05-11 DIAGNOSIS — E11.9 DIABETES MELLITUS TYPE 2 WITHOUT RETINOPATHY (HCC): ICD-10-CM

## 2021-05-11 PROCEDURE — 3075F SYST BP GE 130 - 139MM HG: CPT | Performed by: INTERNAL MEDICINE

## 2021-05-11 PROCEDURE — 99396 PREV VISIT EST AGE 40-64: CPT | Performed by: INTERNAL MEDICINE

## 2021-05-11 PROCEDURE — 3008F BODY MASS INDEX DOCD: CPT | Performed by: INTERNAL MEDICINE

## 2021-05-11 PROCEDURE — 36415 COLL VENOUS BLD VENIPUNCTURE: CPT

## 2021-05-11 PROCEDURE — 87624 HPV HI-RISK TYP POOLED RSLT: CPT | Performed by: INTERNAL MEDICINE

## 2021-05-11 PROCEDURE — 80061 LIPID PANEL: CPT

## 2021-05-11 PROCEDURE — 3079F DIAST BP 80-89 MM HG: CPT | Performed by: INTERNAL MEDICINE

## 2021-05-11 RX ORDER — ROSUVASTATIN CALCIUM 10 MG/1
10 TABLET, COATED ORAL NIGHTLY
Qty: 90 TABLET | Refills: 3 | Status: SHIPPED | OUTPATIENT
Start: 2021-05-11

## 2021-05-11 NOTE — PROGRESS NOTES
Rajni Humphrey is a 39year old female.   Patient presents with:  Physical  Pap      HPI:   Pt comes for her physical   C/c physical  C/o might be moving to [facility] for a gvnt job                History   6/18 visit   C/o headaches x 3 days   Here with mouth as needed.  30 capsule 0   • Insulin Pen Needle (BD PEN NEEDLE ESTEFANY U/F) 32G X 4 MM Does not apply Misc Inject 2 times daily 100 each 3   • Insulin Pen Needle (RELION PEN NEEDLES) 32G X 4 MM Does not apply Misc Use with injections 2 times daily 200 ea Sitting, Cuff Size: large)   Pulse 92   Resp 18   Ht 5' 4\" (1.626 m)   Wt 229 lb (103.9 kg)   LMP 04/12/2021   BMI 39.31 kg/m²   GENERAL: well developed, well nourished,in no apparent distress   SKIN: no rashes,no suspicious lesions  HEENT: atraumatic, no jo-ann Moreno Asa , on acei, was on statin but she stopped   Diet -- advised to follow a low carb, low sugar diet , try to be consistent                Exercise 30 min a day   Sees dr Coarl Simpson-    Preventative medicine  Pap smear done March 2018–normal--

## 2021-06-01 ENCOUNTER — HOSPITAL ENCOUNTER (OUTPATIENT)
Dept: CV DIAGNOSTICS | Age: 46
Discharge: HOME | End: 2021-06-01
Attending: INTERNAL MEDICINE
Payer: MEDICAID

## 2021-06-01 DIAGNOSIS — R01.1 SYSTOLIC MURMUR: ICD-10-CM

## 2021-06-01 PROCEDURE — 93306 TTE W/DOPPLER COMPLETE: CPT | Performed by: INTERNAL MEDICINE

## 2021-06-08 ENCOUNTER — OFFICE VISIT (OUTPATIENT)
Dept: OPTOMETRY | Facility: CLINIC | Age: 46
End: 2021-06-08
Payer: MEDICAID

## 2021-06-08 DIAGNOSIS — E11.9 DIABETES MELLITUS TYPE 2 WITHOUT RETINOPATHY (HCC): Primary | ICD-10-CM

## 2021-06-08 DIAGNOSIS — H52.13 MYOPIA OF BOTH EYES WITH ASTIGMATISM: ICD-10-CM

## 2021-06-08 DIAGNOSIS — H52.203 MYOPIA OF BOTH EYES WITH ASTIGMATISM: ICD-10-CM

## 2021-06-08 PROCEDURE — 92014 COMPRE OPH EXAM EST PT 1/>: CPT | Performed by: OPTOMETRIST

## 2021-06-08 PROCEDURE — 92015 DETERMINE REFRACTIVE STATE: CPT | Performed by: OPTOMETRIST

## 2021-06-08 NOTE — PATIENT INSTRUCTIONS
Diabetes mellitus type 2 without retinopathy [facility]  I advised patient that there is no background diabetic retinopathy in either eye and that they should continue to keep their blood sugar under control and continue to see their physician as directed.  I st

## 2021-06-08 NOTE — PROGRESS NOTES
Manolo Michaels is a 39year old female.     HPI:     HPI     Diabetic Eye Exam     Diabetes Type: Type 2, controlled with diet, on insulin and taking oral medications    Duration: 13 years    Number of years diabetic: 13    Number of years on insulin: 13 Liraglutide (VICTOZA) 18 MG/3ML Subcutaneous Solution Pen-injector Inject 1.8 mg into the skin daily. 27 mL 0   • fluconazole (DIFLUCAN) 150 MG Oral Tab Take 1 tablet (150 mg total) by mouth once a week.  12 tablet 0   • Pantoprazole Sodium 20 MG Oral Tab E EXAM:     Base Eye Exam     Visual Acuity (Snellen - Linear)       Right Left    Dist cc 20/20 20/20    Near sc 4pt 4pt    Correction: Glasses          Tonometry (Icare, 2:07 PM)       Right Left    Pressure 14 18          Pupils       Pupils    Right PERR advised to call immediately if they notice any changes or problems with their vision     Myopia of both eyes with astigmatism  New glasses RX given to update as needed. No orders of the defined types were placed in this encounter.       Meds This Vis

## 2021-06-08 NOTE — PROGRESS NOTES
Tameka Elizondo is a 39year old female.     HPI:     HPI     Diabetic Eye Exam     Diabetes Type: Type 2, controlled with diet, on insulin and taking oral medications    Duration: 13 years    Number of years diabetic: 13    Number of years on insulin: 13 Liraglutide (VICTOZA) 18 MG/3ML Subcutaneous Solution Pen-injector Inject 1.8 mg into the skin daily. 27 mL 0   • fluconazole (DIFLUCAN) 150 MG Oral Tab Take 1 tablet (150 mg total) by mouth once a week.  12 tablet 0   • Pantoprazole Sodium 20 MG Oral Tab E EXAM:     Base Eye Exam     Visual Acuity (Snellen - Linear)       Right Left    Dist cc 20/20 20/20    Near sc 4pt 4pt    Correction: Glasses          Tonometry (Icare, 2:07 PM)       Right Left    Pressure 14 18          Pupils       Pupils    Right PERR advised to call immediately if they notice any changes or problems with their vision     Myopia of both eyes with astigmatism  New glasses RX given to update as needed. No orders of the defined types were placed in this encounter.       Meds This Vis

## 2021-09-02 RX ORDER — INSULIN GLARGINE 100 [IU]/ML
64 INJECTION, SOLUTION SUBCUTANEOUS NIGHTLY
Qty: 60 ML | Refills: 0 | Status: SHIPPED | OUTPATIENT
Start: 2021-09-02 | End: 2022-08-28

## 2021-09-02 RX ORDER — FLUCONAZOLE 150 MG/1
TABLET ORAL
Qty: 12 TABLET | Refills: 0 | Status: SHIPPED | OUTPATIENT
Start: 2021-09-02

## 2021-09-22 NOTE — TELEPHONE ENCOUNTER
----- Message from Summer Miller sent at 3/16/2018  7:52 AM CDT -----  Regarding: Test Results Question  Contact: [phone]  I was wondering if the pregnancy test was done if so what are the results.     I can't get the mammogram next week without t no

## 2021-11-15 ENCOUNTER — TELEPHONE (OUTPATIENT)
Dept: ENDOCRINOLOGY CLINIC | Facility: CLINIC | Age: 46
End: 2021-11-15

## 2022-03-07 ENCOUNTER — OFFICE VISIT (OUTPATIENT)
Dept: SURGERY | Facility: CLINIC | Age: 47
End: 2022-03-07
Payer: COMMERCIAL

## 2022-03-07 VITALS
HEIGHT: 64 IN | BODY MASS INDEX: 39.09 KG/M2 | DIASTOLIC BLOOD PRESSURE: 80 MMHG | SYSTOLIC BLOOD PRESSURE: 112 MMHG | WEIGHT: 229 LBS | HEART RATE: 94 BPM | OXYGEN SATURATION: 97 %

## 2022-03-07 DIAGNOSIS — Z86.79 HISTORY OF HYPERTENSION: ICD-10-CM

## 2022-03-07 DIAGNOSIS — K59.00 CONSTIPATION, UNSPECIFIED CONSTIPATION TYPE: ICD-10-CM

## 2022-03-07 DIAGNOSIS — E11.9 DIABETES MELLITUS TYPE 2 WITHOUT RETINOPATHY (HCC): ICD-10-CM

## 2022-03-07 DIAGNOSIS — E66.9 OBESITY (BMI 30-39.9): ICD-10-CM

## 2022-03-07 DIAGNOSIS — E78.00 HYPERCHOLESTEREMIA: Primary | ICD-10-CM

## 2022-03-07 DIAGNOSIS — Z51.81 ENCOUNTER FOR THERAPEUTIC DRUG MONITORING: ICD-10-CM

## 2022-03-07 DIAGNOSIS — E55.9 VITAMIN D DEFICIENCY: ICD-10-CM

## 2022-03-07 PROCEDURE — 99214 OFFICE O/P EST MOD 30 MIN: CPT | Performed by: NURSE PRACTITIONER

## 2022-03-07 PROCEDURE — 3079F DIAST BP 80-89 MM HG: CPT | Performed by: NURSE PRACTITIONER

## 2022-03-07 PROCEDURE — 3008F BODY MASS INDEX DOCD: CPT | Performed by: NURSE PRACTITIONER

## 2022-03-07 PROCEDURE — 3074F SYST BP LT 130 MM HG: CPT | Performed by: NURSE PRACTITIONER

## 2022-03-07 RX ORDER — SEMAGLUTIDE 1.34 MG/ML
INJECTION, SOLUTION SUBCUTANEOUS
COMMUNITY
Start: 2021-12-07

## 2022-03-07 RX ORDER — PHENTERMINE HYDROCHLORIDE 15 MG/1
15 CAPSULE ORAL EVERY MORNING
Qty: 30 CAPSULE | Refills: 1 | Status: SHIPPED | OUTPATIENT
Start: 2022-03-07

## 2022-03-07 RX ORDER — DICLOFENAC SODIUM 75 MG/1
75 TABLET, DELAYED RELEASE ORAL 2 TIMES DAILY
COMMUNITY
Start: 2021-12-30

## 2022-03-07 RX ORDER — NALTREXONE HYDROCHLORIDE AND BUPROPION HYDROCHLORIDE 8; 90 MG/1; MG/1
TABLET, EXTENDED RELEASE ORAL
Qty: 120 TABLET | Refills: 1 | Status: SHIPPED | OUTPATIENT
Start: 2022-03-07 | End: 2022-03-11

## 2022-03-07 NOTE — PATIENT INSTRUCTIONS
Magnesium glycinate 200 to 500 mg/day for sleep. Life Extension. NOW. Garden of Life. Or consider Natural Calm. by [employer]  Follow dosage instructions. Start 1 teaspoon and increase up to 3 teaspoons as needed for sleep. Start [employer] or meet with dietician.

## 2022-05-06 ENCOUNTER — LAB ENCOUNTER (OUTPATIENT)
Dept: LAB | Facility: HOSPITAL | Age: 47
End: 2022-05-06
Attending: INTERNAL MEDICINE
Payer: COMMERCIAL

## 2022-05-06 ENCOUNTER — HOSPITAL ENCOUNTER (OUTPATIENT)
Dept: GENERAL RADIOLOGY | Facility: HOSPITAL | Age: 47
Discharge: HOME | End: 2022-05-06
Attending: INTERNAL MEDICINE
Payer: COMMERCIAL

## 2022-05-06 ENCOUNTER — OFFICE VISIT (OUTPATIENT)
Dept: ENDOCRINOLOGY CLINIC | Facility: CLINIC | Age: 47
End: 2022-05-06
Payer: COMMERCIAL

## 2022-05-06 VITALS
WEIGHT: 231 LBS | HEART RATE: 94 BPM | DIASTOLIC BLOOD PRESSURE: 91 MMHG | BODY MASS INDEX: 40 KG/M2 | SYSTOLIC BLOOD PRESSURE: 138 MMHG

## 2022-05-06 DIAGNOSIS — E11.9 DIABETES MELLITUS TYPE 2 WITHOUT RETINOPATHY (HCC): Primary | ICD-10-CM

## 2022-05-06 DIAGNOSIS — G89.29 CHRONIC PAIN OF LEFT KNEE: ICD-10-CM

## 2022-05-06 DIAGNOSIS — G89.29 CHRONIC PAIN OF LEFT ANKLE: ICD-10-CM

## 2022-05-06 DIAGNOSIS — M25.562 CHRONIC PAIN OF LEFT KNEE: ICD-10-CM

## 2022-05-06 DIAGNOSIS — M25.572 CHRONIC PAIN OF LEFT ANKLE: ICD-10-CM

## 2022-05-06 PROBLEM — E66.01 MORBID (SEVERE) OBESITY DUE TO EXCESS CALORIES (HCC): Status: ACTIVE | Noted: 2022-05-06

## 2022-05-06 PROBLEM — E11.65 TYPE 2 DIABETES MELLITUS WITH HYPERGLYCEMIA (HCC): Status: ACTIVE | Noted: 2022-05-06

## 2022-05-06 LAB
ALBUMIN SERPL-MCNC: 3.9 G/DL (ref 3.4–5)
ALBUMIN/GLOB SERPL: 1 {RATIO} (ref 1–2)
ALP LIVER SERPL-CCNC: 86 U/L
ALT SERPL-CCNC: 30 U/L
ANION GAP SERPL CALC-SCNC: 7 MMOL/L (ref 0–18)
AST SERPL-CCNC: 15 U/L (ref 15–37)
BILIRUB SERPL-MCNC: 0.5 MG/DL (ref 0.1–2)
BUN BLD-MCNC: 11 MG/DL (ref 7–18)
BUN/CREAT SERPL: 13.9 (ref 10–20)
CALCIUM BLD-MCNC: 8.9 MG/DL (ref 8.5–10.1)
CARTRIDGE LOT#: ABNORMAL NUMERIC
CHLORIDE SERPL-SCNC: 105 MMOL/L (ref 98–112)
CHOLEST SERPL-MCNC: 188 MG/DL (ref ?–200)
CO2 SERPL-SCNC: 25 MMOL/L (ref 21–32)
CREAT BLD-MCNC: 0.79 MG/DL
CREAT UR-SCNC: 146 MG/DL
DEPRECATED RDW RBC AUTO: 41.3 FL (ref 35.1–46.3)
ERYTHROCYTE [DISTWIDTH] IN BLOOD BY AUTOMATED COUNT: 13.9 % (ref 11–15)
FASTING PATIENT LIPID ANSWER: YES
FASTING STATUS PATIENT QL REPORTED: YES
GLOBULIN PLAS-MCNC: 3.9 G/DL (ref 2.8–4.4)
GLUCOSE BLD-MCNC: 237 MG/DL (ref 70–99)
GLUCOSE BLOOD: 270
HCT VFR BLD AUTO: 44.3 %
HDLC SERPL-MCNC: 53 MG/DL (ref 40–59)
HEMOGLOBIN A1C: 8.3 % (ref 4.3–5.6)
HGB BLD-MCNC: 14.1 G/DL
LDLC SERPL CALC-MCNC: 124 MG/DL (ref ?–100)
MCH RBC QN AUTO: 26.3 PG (ref 26–34)
MCHC RBC AUTO-ENTMCNC: 31.8 G/DL (ref 31–37)
MCV RBC AUTO: 82.6 FL
MICROALBUMIN UR-MCNC: 4.56 MG/DL
MICROALBUMIN/CREAT 24H UR-RTO: 31.2 UG/MG (ref ?–30)
NONHDLC SERPL-MCNC: 135 MG/DL (ref ?–130)
OSMOLALITY SERPL CALC.SUM OF ELEC: 291 MOSM/KG (ref 275–295)
PLATELET # BLD AUTO: 373 10(3)UL (ref 150–450)
POTASSIUM SERPL-SCNC: 3.7 MMOL/L (ref 3.5–5.1)
PROT SERPL-MCNC: 7.8 G/DL (ref 6.4–8.2)
RBC # BLD AUTO: 5.36 X10(6)UL
SODIUM SERPL-SCNC: 137 MMOL/L (ref 136–145)
TEST STRIP LOT #: NORMAL NUMERIC
TRIGL SERPL-MCNC: 60 MG/DL (ref 30–149)
TSI SER-ACNC: 0.59 MIU/ML (ref 0.36–3.74)
VLDLC SERPL CALC-MCNC: 11 MG/DL (ref 0–30)
WBC # BLD AUTO: 7.3 X10(3) UL (ref 4–11)

## 2022-05-06 PROCEDURE — 99214 OFFICE O/P EST MOD 30 MIN: CPT | Performed by: INTERNAL MEDICINE

## 2022-05-06 PROCEDURE — 3061F NEG MICROALBUMINURIA REV: CPT | Performed by: INTERNAL MEDICINE

## 2022-05-06 PROCEDURE — 3052F HG A1C>EQUAL 8.0%<EQUAL 9.0%: CPT | Performed by: INTERNAL MEDICINE

## 2022-05-06 PROCEDURE — 3060F POS MICROALBUMINURIA REV: CPT | Performed by: INTERNAL MEDICINE

## 2022-05-06 PROCEDURE — 82947 ASSAY GLUCOSE BLOOD QUANT: CPT | Performed by: INTERNAL MEDICINE

## 2022-05-06 PROCEDURE — 36415 COLL VENOUS BLD VENIPUNCTURE: CPT | Performed by: INTERNAL MEDICINE

## 2022-05-06 PROCEDURE — 3080F DIAST BP >= 90 MM HG: CPT | Performed by: INTERNAL MEDICINE

## 2022-05-06 PROCEDURE — 80053 COMPREHEN METABOLIC PANEL: CPT | Performed by: INTERNAL MEDICINE

## 2022-05-06 PROCEDURE — 85027 COMPLETE CBC AUTOMATED: CPT | Performed by: INTERNAL MEDICINE

## 2022-05-06 PROCEDURE — 73562 X-RAY EXAM OF KNEE 3: CPT | Performed by: INTERNAL MEDICINE

## 2022-05-06 PROCEDURE — 82043 UR ALBUMIN QUANTITATIVE: CPT | Performed by: INTERNAL MEDICINE

## 2022-05-06 PROCEDURE — 83036 HEMOGLOBIN GLYCOSYLATED A1C: CPT | Performed by: INTERNAL MEDICINE

## 2022-05-06 PROCEDURE — 80061 LIPID PANEL: CPT

## 2022-05-06 PROCEDURE — 73610 X-RAY EXAM OF ANKLE: CPT | Performed by: INTERNAL MEDICINE

## 2022-05-06 PROCEDURE — 84443 ASSAY THYROID STIM HORMONE: CPT | Performed by: INTERNAL MEDICINE

## 2022-05-06 PROCEDURE — 82570 ASSAY OF URINE CREATININE: CPT | Performed by: INTERNAL MEDICINE

## 2022-05-06 PROCEDURE — 3075F SYST BP GE 130 - 139MM HG: CPT | Performed by: INTERNAL MEDICINE

## 2022-05-06 RX ORDER — EMPAGLIFLOZIN 25 MG/1
25 TABLET, FILM COATED ORAL DAILY
Qty: 90 TABLET | Refills: 1 | Status: SHIPPED | OUTPATIENT
Start: 2022-05-06

## 2022-05-06 RX ORDER — LOSARTAN POTASSIUM 50 MG/1
50 TABLET ORAL DAILY
Qty: 90 TABLET | Refills: 1 | Status: SHIPPED | OUTPATIENT
Start: 2022-05-06

## 2022-05-06 RX ORDER — GLIMEPIRIDE 2 MG/1
2 TABLET ORAL
Qty: 90 TABLET | Refills: 1 | Status: SHIPPED | OUTPATIENT
Start: 2022-05-06

## 2022-05-09 RX ORDER — INSULIN GLARGINE 100 [IU]/ML
64 INJECTION, SOLUTION SUBCUTANEOUS NIGHTLY
Qty: 30 ML | Refills: 0 | Status: SHIPPED | OUTPATIENT
Start: 2022-05-09 | End: 2023-05-04

## 2022-05-09 RX ORDER — ROSUVASTATIN CALCIUM 20 MG/1
20 TABLET, COATED ORAL NIGHTLY
Qty: 90 TABLET | Refills: 1 | Status: SHIPPED | OUTPATIENT
Start: 2022-05-09 | End: 2022-05-09

## 2022-05-09 RX ORDER — PANTOPRAZOLE SODIUM 20 MG/1
40 TABLET, DELAYED RELEASE ORAL
Qty: 180 TABLET | Refills: 0 | Status: SHIPPED | OUTPATIENT
Start: 2022-05-09

## 2022-05-09 RX ORDER — FLUCONAZOLE 150 MG/1
150 TABLET ORAL WEEKLY
Qty: 12 TABLET | Refills: 0 | Status: SHIPPED | OUTPATIENT
Start: 2022-05-09

## 2022-05-09 RX ORDER — INSULIN GLARGINE 100 [IU]/ML
64 INJECTION, SOLUTION SUBCUTANEOUS NIGHTLY
Qty: 60 ML | Refills: 0 | Status: SHIPPED | OUTPATIENT
Start: 2022-05-09 | End: 2023-05-04

## 2022-05-09 RX ORDER — ROSUVASTATIN CALCIUM 20 MG/1
20 TABLET, COATED ORAL NIGHTLY
Qty: 90 TABLET | Refills: 1 | Status: SHIPPED | OUTPATIENT
Start: 2022-05-09

## 2022-06-20 ENCOUNTER — OFFICE VISIT (OUTPATIENT)
Dept: INTERNAL MEDICINE CLINIC | Facility: CLINIC | Age: 47
End: 2022-06-20
Payer: COMMERCIAL

## 2022-06-20 VITALS
WEIGHT: 235 LBS | HEART RATE: 93 BPM | SYSTOLIC BLOOD PRESSURE: 135 MMHG | BODY MASS INDEX: 40.12 KG/M2 | DIASTOLIC BLOOD PRESSURE: 90 MMHG | HEIGHT: 64 IN

## 2022-06-20 DIAGNOSIS — L02.91 ABSCESS: Primary | ICD-10-CM

## 2022-06-20 DIAGNOSIS — I10 ESSENTIAL HYPERTENSION: ICD-10-CM

## 2022-06-20 DIAGNOSIS — Z12.31 SCREENING MAMMOGRAM, ENCOUNTER FOR: ICD-10-CM

## 2022-06-20 DIAGNOSIS — E11.9 DIABETES MELLITUS TYPE 2 WITHOUT RETINOPATHY (HCC): ICD-10-CM

## 2022-06-20 DIAGNOSIS — K21.9 GASTROESOPHAGEAL REFLUX DISEASE, UNSPECIFIED WHETHER ESOPHAGITIS PRESENT: ICD-10-CM

## 2022-06-20 PROCEDURE — 99214 OFFICE O/P EST MOD 30 MIN: CPT | Performed by: INTERNAL MEDICINE

## 2022-06-20 PROCEDURE — 3080F DIAST BP >= 90 MM HG: CPT | Performed by: INTERNAL MEDICINE

## 2022-06-20 PROCEDURE — 3075F SYST BP GE 130 - 139MM HG: CPT | Performed by: INTERNAL MEDICINE

## 2022-06-20 PROCEDURE — 3008F BODY MASS INDEX DOCD: CPT | Performed by: INTERNAL MEDICINE

## 2022-06-20 RX ORDER — PANTOPRAZOLE SODIUM 20 MG/1
20 TABLET, DELAYED RELEASE ORAL
Qty: 90 TABLET | Refills: 0 | COMMUNITY
Start: 2022-06-20

## 2022-06-20 RX ORDER — CEPHALEXIN 500 MG/1
500 CAPSULE ORAL 3 TIMES DAILY
Qty: 21 CAPSULE | Refills: 0 | Status: SHIPPED | OUTPATIENT
Start: 2022-06-20

## 2022-06-20 RX ORDER — SEMAGLUTIDE 1.34 MG/ML
INJECTION, SOLUTION SUBCUTANEOUS
Refills: 3 | Status: CANCELLED | OUTPATIENT
Start: 2022-06-20

## 2022-06-21 ENCOUNTER — HOSPITAL ENCOUNTER (OUTPATIENT)
Dept: MAMMOGRAPHY | Age: 47
Discharge: HOME | End: 2022-06-21
Attending: INTERNAL MEDICINE
Payer: COMMERCIAL

## 2022-06-21 DIAGNOSIS — Z12.31 SCREENING MAMMOGRAM, ENCOUNTER FOR: ICD-10-CM

## 2022-06-21 PROCEDURE — 77067 SCR MAMMO BI INCL CAD: CPT | Performed by: INTERNAL MEDICINE

## 2022-06-21 PROCEDURE — 77063 BREAST TOMOSYNTHESIS BI: CPT | Performed by: INTERNAL MEDICINE

## 2022-06-23 ENCOUNTER — PATIENT MESSAGE (OUTPATIENT)
Dept: INTERNAL MEDICINE CLINIC | Facility: CLINIC | Age: 47
End: 2022-06-23

## 2022-06-23 DIAGNOSIS — L02.91 ABSCESS: Primary | ICD-10-CM

## 2022-06-23 NOTE — TELEPHONE ENCOUNTER
From: Ramona Sullivan  To: Ishmael Ya MD  Sent: 6/23/2022 1:39 PM CDT  Subject: Abscess    I am still in pain some infection came out but the abscess spreaded and hardened even more. Do i need a referral for a surgeon?

## 2022-06-23 NOTE — TELEPHONE ENCOUNTER
From: Ni Mon  To: Shital Walker MD  Sent: 6/23/2022 1:39 PM CDT  Subject: Abscess    I am still in pain some infection came out but the abscess spreaded and hardened even more. Do i need a referral for a surgeon?

## 2022-06-25 ENCOUNTER — APPOINTMENT (OUTPATIENT)
Dept: CT IMAGING | Facility: HOSPITAL | Age: 47
End: 2022-06-25
Attending: EMERGENCY MEDICINE
Payer: COMMERCIAL

## 2022-06-25 ENCOUNTER — HOSPITAL ENCOUNTER (EMERGENCY)
Facility: HOSPITAL | Age: 47
Discharge: HOME | End: 2022-06-25
Attending: EMERGENCY MEDICINE
Payer: COMMERCIAL

## 2022-06-25 VITALS
BODY MASS INDEX: 40.12 KG/M2 | SYSTOLIC BLOOD PRESSURE: 129 MMHG | WEIGHT: 235 LBS | RESPIRATION RATE: 16 BRPM | OXYGEN SATURATION: 99 % | TEMPERATURE: 98 F | DIASTOLIC BLOOD PRESSURE: 88 MMHG | HEIGHT: 64 IN | HEART RATE: 73 BPM

## 2022-06-25 DIAGNOSIS — L02.31 ABSCESS OF BUTTOCK: Primary | ICD-10-CM

## 2022-06-25 LAB
ANION GAP SERPL CALC-SCNC: 8 MMOL/L (ref 0–18)
B-HCG UR QL: NEGATIVE
BASOPHILS # BLD AUTO: 0.05 X10(3) UL (ref 0–0.2)
BASOPHILS NFR BLD AUTO: 0.7 %
BUN BLD-MCNC: 12 MG/DL (ref 7–18)
BUN/CREAT SERPL: 17.4 (ref 10–20)
CALCIUM BLD-MCNC: 8.8 MG/DL (ref 8.5–10.1)
CHLORIDE SERPL-SCNC: 108 MMOL/L (ref 98–112)
CO2 SERPL-SCNC: 24 MMOL/L (ref 21–32)
CREAT BLD-MCNC: 0.69 MG/DL
DEPRECATED RDW RBC AUTO: 39.8 FL (ref 35.1–46.3)
EOSINOPHIL # BLD AUTO: 0.23 X10(3) UL (ref 0–0.7)
EOSINOPHIL NFR BLD AUTO: 3.1 %
ERYTHROCYTE [DISTWIDTH] IN BLOOD BY AUTOMATED COUNT: 13.7 % (ref 11–15)
GLUCOSE BLD-MCNC: 183 MG/DL (ref 70–99)
GLUCOSE BLDC GLUCOMTR-MCNC: 137 MG/DL (ref 70–99)
HCT VFR BLD AUTO: 40.8 %
HGB BLD-MCNC: 13.4 G/DL
IMM GRANULOCYTES # BLD AUTO: 0.04 X10(3) UL (ref 0–1)
IMM GRANULOCYTES NFR BLD: 0.5 %
LYMPHOCYTES # BLD AUTO: 2.53 X10(3) UL (ref 1–4)
LYMPHOCYTES NFR BLD AUTO: 34.2 %
MCH RBC QN AUTO: 26.5 PG (ref 26–34)
MCHC RBC AUTO-ENTMCNC: 32.8 G/DL (ref 31–37)
MCV RBC AUTO: 80.6 FL
MONOCYTES # BLD AUTO: 0.68 X10(3) UL (ref 0.1–1)
MONOCYTES NFR BLD AUTO: 9.2 %
NEUTROPHILS # BLD AUTO: 3.86 X10 (3) UL (ref 1.5–7.7)
NEUTROPHILS # BLD AUTO: 3.86 X10(3) UL (ref 1.5–7.7)
NEUTROPHILS NFR BLD AUTO: 52.3 %
OSMOLALITY SERPL CALC.SUM OF ELEC: 294 MOSM/KG (ref 275–295)
PLATELET # BLD AUTO: 335 10(3)UL (ref 150–450)
POTASSIUM SERPL-SCNC: 3.6 MMOL/L (ref 3.5–5.1)
RBC # BLD AUTO: 5.06 X10(6)UL
SODIUM SERPL-SCNC: 140 MMOL/L (ref 136–145)
WBC # BLD AUTO: 7.4 X10(3) UL (ref 4–11)

## 2022-06-25 PROCEDURE — 82962 GLUCOSE BLOOD TEST: CPT

## 2022-06-25 PROCEDURE — 96375 TX/PRO/DX INJ NEW DRUG ADDON: CPT

## 2022-06-25 PROCEDURE — 81025 URINE PREGNANCY TEST: CPT

## 2022-06-25 PROCEDURE — 10061 I&D ABSCESS COMP/MULTIPLE: CPT

## 2022-06-25 PROCEDURE — 96374 THER/PROPH/DIAG INJ IV PUSH: CPT

## 2022-06-25 PROCEDURE — 72193 CT PELVIS W/DYE: CPT | Performed by: EMERGENCY MEDICINE

## 2022-06-25 PROCEDURE — 99284 EMERGENCY DEPT VISIT MOD MDM: CPT

## 2022-06-25 PROCEDURE — 85025 COMPLETE CBC W/AUTO DIFF WBC: CPT | Performed by: EMERGENCY MEDICINE

## 2022-06-25 PROCEDURE — 80048 BASIC METABOLIC PNL TOTAL CA: CPT | Performed by: EMERGENCY MEDICINE

## 2022-06-25 RX ORDER — MORPHINE SULFATE 4 MG/ML
4 INJECTION, SOLUTION INTRAMUSCULAR; INTRAVENOUS ONCE
Status: COMPLETED | OUTPATIENT
Start: 2022-06-25 | End: 2022-06-25

## 2022-06-25 RX ORDER — DOXYCYCLINE HYCLATE 100 MG/1
100 CAPSULE ORAL 2 TIMES DAILY
Qty: 20 CAPSULE | Refills: 0 | Status: SHIPPED | OUTPATIENT
Start: 2022-06-25 | End: 2022-07-05

## 2022-06-25 RX ORDER — ONDANSETRON 2 MG/ML
4 INJECTION INTRAMUSCULAR; INTRAVENOUS ONCE
Status: COMPLETED | OUTPATIENT
Start: 2022-06-25 | End: 2022-06-25

## 2022-06-25 NOTE — ED INITIAL ASSESSMENT (HPI)
Patient complain of left buttocks abscess since last week. Patient seen MD this week and was rx with keflex.

## 2022-06-25 NOTE — ED QUICK NOTES
Discharge instructions given to pt. Pt verbalized understanding of home care, medication use, and to follow up for packing removal in three days. Pt denied further questions or concerns. Pt ambulatory out of ED with family, discharged in stable condition.

## 2022-06-26 RX ORDER — SEMAGLUTIDE 1.34 MG/ML
1 INJECTION, SOLUTION SUBCUTANEOUS WEEKLY
Qty: 9 ML | Refills: 0 | Status: SHIPPED | OUTPATIENT
Start: 2022-06-26

## 2022-06-29 ENCOUNTER — OFFICE VISIT (OUTPATIENT)
Dept: INTERNAL MEDICINE CLINIC | Facility: CLINIC | Age: 47
End: 2022-06-29
Payer: COMMERCIAL

## 2022-06-29 VITALS
DIASTOLIC BLOOD PRESSURE: 84 MMHG | BODY MASS INDEX: 40.12 KG/M2 | HEART RATE: 103 BPM | HEIGHT: 64 IN | WEIGHT: 235 LBS | SYSTOLIC BLOOD PRESSURE: 124 MMHG

## 2022-06-29 DIAGNOSIS — Z12.11 COLON CANCER SCREENING: ICD-10-CM

## 2022-06-29 DIAGNOSIS — Z00.00 PHYSICAL EXAM, ANNUAL: Primary | ICD-10-CM

## 2022-06-29 DIAGNOSIS — Z79.4 TYPE 2 DIABETES MELLITUS WITH HYPERGLYCEMIA, WITH LONG-TERM CURRENT USE OF INSULIN (HCC): ICD-10-CM

## 2022-06-29 DIAGNOSIS — E11.65 TYPE 2 DIABETES MELLITUS WITH HYPERGLYCEMIA, WITH LONG-TERM CURRENT USE OF INSULIN (HCC): ICD-10-CM

## 2022-06-29 PROCEDURE — 3008F BODY MASS INDEX DOCD: CPT | Performed by: INTERNAL MEDICINE

## 2022-06-29 PROCEDURE — 99396 PREV VISIT EST AGE 40-64: CPT | Performed by: INTERNAL MEDICINE

## 2022-06-29 PROCEDURE — 3079F DIAST BP 80-89 MM HG: CPT | Performed by: INTERNAL MEDICINE

## 2022-06-29 PROCEDURE — 3074F SYST BP LT 130 MM HG: CPT | Performed by: INTERNAL MEDICINE

## 2022-07-01 ENCOUNTER — OFFICE VISIT (OUTPATIENT)
Dept: SURGERY | Facility: CLINIC | Age: 47
End: 2022-07-01
Payer: COMMERCIAL

## 2022-07-01 VITALS — WEIGHT: 235 LBS | BODY MASS INDEX: 40.12 KG/M2 | HEIGHT: 64 IN

## 2022-07-01 DIAGNOSIS — L02.31 ABSCESS AND CELLULITIS OF GLUTEAL REGION: Primary | ICD-10-CM

## 2022-07-01 DIAGNOSIS — L03.317 ABSCESS AND CELLULITIS OF GLUTEAL REGION: Primary | ICD-10-CM

## 2022-07-01 PROCEDURE — 87147 CULTURE TYPE IMMUNOLOGIC: CPT | Performed by: SURGERY

## 2022-07-01 PROCEDURE — 87075 CULTR BACTERIA EXCEPT BLOOD: CPT | Performed by: SURGERY

## 2022-07-01 PROCEDURE — 87077 CULTURE AEROBIC IDENTIFY: CPT | Performed by: SURGERY

## 2022-07-01 PROCEDURE — 87205 SMEAR GRAM STAIN: CPT | Performed by: SURGERY

## 2022-07-01 PROCEDURE — 87186 SC STD MICRODIL/AGAR DIL: CPT | Performed by: SURGERY

## 2022-07-01 PROCEDURE — 87070 CULTURE OTHR SPECIMN AEROBIC: CPT | Performed by: SURGERY

## 2022-07-01 NOTE — PROCEDURES
Procedure Note  The risks, benefits, alternatives and expected recovery were explained. The pt expressed understanding and wished to proceed with the procedure. Preop:  Abscess, complicated, left buttock  Postop:  Same  Procedure: Incision and drainage of complicated left buttock abscess  Anesthesia:  Local  EBL:  Minimal  Description:  The skin was prepped and draped in sterile fashion. The skin and subcutaneous tissue surrounding the abscess cavity was infiltrated with local anesthetic. An 8mm elliptical incision was made of the skin overlying the abscess. Copious amount of purulent fluid and debris was expressed. Cultures were obtained. The cavity was probed and irrigated with saline. The wound was packed with gauze and dressed. The patient tolerated the procedure well and was given wound care instructions.     Lorenzo Burgess MD

## 2022-07-06 ENCOUNTER — TELEPHONE (OUTPATIENT)
Dept: SURGERY | Facility: CLINIC | Age: 47
End: 2022-07-06

## 2022-07-06 NOTE — TELEPHONE ENCOUNTER
Called and spoke to patient. Denies fever, chills, redness or hot to touch. She is still experiencing pain when sitting 7/10. She is taking motrin alt with tylenol ES for pain. She completed Doxycycline yesterday. She has an apt to return to clinic for Monday 7/11. I advised her to come Friday 7/8 for evaluation, but she isn't sure that she can get off of work. Do you want to prescribe alternate antibiotic?

## 2022-07-11 ENCOUNTER — OFFICE VISIT (OUTPATIENT)
Dept: SURGERY | Facility: CLINIC | Age: 47
End: 2022-07-11
Payer: COMMERCIAL

## 2022-07-11 VITALS — HEIGHT: 64 IN | WEIGHT: 235 LBS | BODY MASS INDEX: 40.12 KG/M2

## 2022-07-11 DIAGNOSIS — Z98.890 POST-OPERATIVE STATE: Primary | ICD-10-CM

## 2022-07-11 PROCEDURE — 3008F BODY MASS INDEX DOCD: CPT | Performed by: SURGERY

## 2022-07-11 PROCEDURE — 99213 OFFICE O/P EST LOW 20 MIN: CPT | Performed by: SURGERY

## 2022-07-21 ENCOUNTER — OFFICE VISIT (OUTPATIENT)
Dept: SURGERY | Facility: CLINIC | Age: 47
End: 2022-07-21
Payer: COMMERCIAL

## 2022-07-21 DIAGNOSIS — Z98.890 POST-OPERATIVE STATE: Primary | ICD-10-CM

## 2022-07-21 PROCEDURE — 99213 OFFICE O/P EST LOW 20 MIN: CPT | Performed by: SURGERY

## 2022-07-21 NOTE — PROGRESS NOTES
S:  Crystal Curry is a 41year old female sp I and D of perirectal abscess. The wound is healing, still open, hard to pack now. Doing well, no fevers, no chills, tolerating a general diet, generally normal bowel movements, no calf tenderness nor lower extremity edema, no shortness of breath, no chest pain. O:  LMP 05/29/2022   GEN:  No acute distress  L: nonlabored respirations, CTA  H: reg rate  Abd:  Soft, NT,ND. Skin: Incision open 2cm x 1 cm x 1 cm deep, no drainage, no cellulitis   Extr: No edema, no calf tenderness, neg Ny's sign    Path:  Reviewed w pt    Assessment   Post-operative state  (primary encounter diagnosis)    Doing well post op  Continue to keep incision clean and covered. Maintain a healthy diet. Maintain good hydration. F/u prn.          Zuleika Valencia MD

## 2022-08-01 ENCOUNTER — MED REC SCAN ONLY (OUTPATIENT)
Dept: INTERNAL MEDICINE CLINIC | Facility: CLINIC | Age: 47
End: 2022-08-01

## 2022-08-01 RX ORDER — INSULIN GLARGINE 100 [IU]/ML
64 INJECTION, SOLUTION SUBCUTANEOUS NIGHTLY
Qty: 60 ML | Refills: 0 | Status: SHIPPED | OUTPATIENT
Start: 2022-08-01 | End: 2023-07-27

## 2022-08-12 ENCOUNTER — NURSE ONLY (OUTPATIENT)
Dept: GASTROENTEROLOGY | Facility: CLINIC | Age: 47
End: 2022-08-12

## 2022-08-12 DIAGNOSIS — Z12.11 SCREEN FOR COLON CANCER: Primary | ICD-10-CM

## 2022-08-12 NOTE — PROGRESS NOTES
Dr. Patrick Harrington,     Called patient for a scheduled telephone colon screening. GI MD preference: none  Insurance:  BCBS- federal    CBC from: 6/25/2022 WNL  PCP visit on: 6/29/2022    First colonoscopy: yes     Anticoagulants: no  Diabetic Meds: LANTUS, OZEMPIC, GLIMEPIRIDE, JARDIANCE   BP meds(Ace inhibitors/ARB's): LOSARTAN   Weight loss meds (phentermine/vyvanse): PHENTERMINE   Iron supplement (RX/OTC): no   Height & Weight/BMI: 5'4\"/235LBS/40  Hx of Cardiac/CVA issues/(MI/Stroke): no  Devices Pacemaker/Defibrillator/Stents: no  Resp. Issues/Oxygen Use/MAX/COPD: MAX,  Mild- no cpap   Issues w/Anesthesia: difficulty arousal     Symptoms (Y/N):  no    Family history of colon cancer: no    Medications, pharmacy, and allergies verified with patient over the phone. Please advise on orders and prep, thank you.

## 2022-08-15 NOTE — PROGRESS NOTES
Dx: average risk crc screening  Colonoscopy with MAC sedation  Split dose golytely preparation, sent to pharmacy  Please review prep instructions with patient    - HOLD ACE/ARBs the night before and/or the day of the procedure(s) -   - NO herbal supplements or weight loss medications x 7 days prior to the procedure(s)  - DM meds per endocrine

## 2022-08-15 NOTE — PROGRESS NOTES
Pt is scheduled with Dr Ion Hyman on 09/15/2022    I will send her a message after the appt to ask about the diabetic medications.     Chart flagged

## 2022-08-15 NOTE — PROGRESS NOTES
GI clinical-     Please obtain diabetic med. adjustment orders from Dr. Kiersten Dutta. [taking: Glimepiride, Jardiance, Lantus insulin nightly & Ozempic every Monday]     May closed TE when appropriate.      Thank you

## 2022-09-15 ENCOUNTER — OFFICE VISIT (OUTPATIENT)
Dept: ENDOCRINOLOGY CLINIC | Facility: CLINIC | Age: 47
End: 2022-09-15
Payer: COMMERCIAL

## 2022-09-15 VITALS
SYSTOLIC BLOOD PRESSURE: 128 MMHG | WEIGHT: 234.63 LBS | BODY MASS INDEX: 40 KG/M2 | HEART RATE: 94 BPM | DIASTOLIC BLOOD PRESSURE: 85 MMHG

## 2022-09-15 DIAGNOSIS — E11.9 DIABETES MELLITUS TYPE 2 WITHOUT RETINOPATHY (HCC): Primary | ICD-10-CM

## 2022-09-15 DIAGNOSIS — E04.2 MULTINODULAR GOITER: ICD-10-CM

## 2022-09-15 DIAGNOSIS — E78.5 HYPERLIPIDEMIA, UNSPECIFIED HYPERLIPIDEMIA TYPE: ICD-10-CM

## 2022-09-15 LAB
CARTRIDGE LOT#: ABNORMAL NUMERIC
GLUCOSE BLOOD: 163
HEMOGLOBIN A1C: 8.9 % (ref 4.3–5.6)
TEST STRIP LOT #: NORMAL NUMERIC

## 2022-09-15 PROCEDURE — 82947 ASSAY GLUCOSE BLOOD QUANT: CPT | Performed by: INTERNAL MEDICINE

## 2022-09-15 PROCEDURE — 3079F DIAST BP 80-89 MM HG: CPT | Performed by: INTERNAL MEDICINE

## 2022-09-15 PROCEDURE — 83036 HEMOGLOBIN GLYCOSYLATED A1C: CPT | Performed by: INTERNAL MEDICINE

## 2022-09-15 PROCEDURE — 3074F SYST BP LT 130 MM HG: CPT | Performed by: INTERNAL MEDICINE

## 2022-09-15 PROCEDURE — 3052F HG A1C>EQUAL 8.0%<EQUAL 9.0%: CPT | Performed by: INTERNAL MEDICINE

## 2022-09-15 PROCEDURE — 99214 OFFICE O/P EST MOD 30 MIN: CPT | Performed by: INTERNAL MEDICINE

## 2022-09-15 RX ORDER — TIRZEPATIDE 7.5 MG/.5ML
7.5 INJECTION, SOLUTION SUBCUTANEOUS WEEKLY
Qty: 2 ML | Refills: 3 | Status: SHIPPED | OUTPATIENT
Start: 2022-10-15

## 2022-09-15 RX ORDER — TIRZEPATIDE 5 MG/.5ML
5 INJECTION, SOLUTION SUBCUTANEOUS WEEKLY
Qty: 2 ML | Refills: 0 | Status: SHIPPED | OUTPATIENT
Start: 2022-09-15

## 2022-09-15 RX ORDER — GLIMEPIRIDE 4 MG/1
4 TABLET ORAL
Qty: 90 TABLET | Refills: 1 | Status: SHIPPED | OUTPATIENT
Start: 2022-09-15

## 2022-09-15 NOTE — PATIENT INSTRUCTIONS
Dr. Kenyon Segovia [phone]    STOP Ozempic    START Mounjaro 5mg subcutaneous weekly for one month then increase to 7.5mg subcutaneous weekly     INCREASE Glimepiride to 4mg daily    CONTINUE Jardiance    CONTINUE Lantus 60 units subcutaneous daily

## 2022-09-23 NOTE — PROGRESS NOTES
Dr Sohn Summers,    Pt is scheduled for a colonoscopy    Please advise how she should take her diabetic medication prior to procedure on 11/03/2022    She will be on clear liquids after a light breakfast the day before her procedure

## 2022-09-23 NOTE — PROGRESS NOTES
On day of prep decrease Lantus to 40 units subcutaneous daily. Hold the Jardiance and Glimepiride. Ok to take [facility] on normal scheduled day. No diabetic medications in AM prior to CLN. Thanks.      Medications for DM   Lantus 60 units SQ daily  Mounjaro 5mg subcutaneous weekly  Jardiance 25mg PO daily   Glimepiride 2mg PO daily

## 2022-09-26 ENCOUNTER — TELEPHONE (OUTPATIENT)
Dept: ENDOCRINOLOGY CLINIC | Facility: CLINIC | Age: 47
End: 2022-09-26

## 2022-09-26 NOTE — PROGRESS NOTES
[facility] message sent to patient with instructions from Dr. Allison Mayes - patient advised to call with questions

## 2022-10-10 RX ORDER — TIRZEPATIDE 5 MG/.5ML
INJECTION, SOLUTION SUBCUTANEOUS
Qty: 2 ML | Refills: 0 | OUTPATIENT
Start: 2022-10-10

## 2022-10-17 RX ORDER — INSULIN GLARGINE 100 [IU]/ML
64 INJECTION, SOLUTION SUBCUTANEOUS NIGHTLY
Qty: 60 ML | Refills: 0 | Status: SHIPPED | OUTPATIENT
Start: 2022-10-17 | End: 2023-10-12

## 2022-10-17 NOTE — TELEPHONE ENCOUNTER
LOV 09/15/22. RTC 4 months. No f/u. Sent [place] [employer] reminder to schedule f/u. Pended 3 month supply.

## 2022-10-18 ENCOUNTER — IMMUNIZATION (OUTPATIENT)
Dept: LAB | Age: 47
End: 2022-10-18
Attending: EMERGENCY MEDICINE
Payer: COMMERCIAL

## 2022-10-18 DIAGNOSIS — Z23 NEED FOR VACCINATION: Primary | ICD-10-CM

## 2022-10-18 PROCEDURE — 90686 IIV4 VACC NO PRSV 0.5 ML IM: CPT

## 2022-10-18 PROCEDURE — 90471 IMMUNIZATION ADMIN: CPT

## 2022-10-20 ENCOUNTER — TELEPHONE (OUTPATIENT)
Dept: ENDOCRINOLOGY CLINIC | Facility: CLINIC | Age: 47
End: 2022-10-20

## 2022-10-20 ENCOUNTER — PATIENT MESSAGE (OUTPATIENT)
Dept: ENDOCRINOLOGY CLINIC | Facility: CLINIC | Age: 47
End: 2022-10-20

## 2022-10-24 RX ORDER — EMPAGLIFLOZIN 25 MG/1
TABLET, FILM COATED ORAL
Qty: 90 TABLET | Refills: 0 | Status: SHIPPED | OUTPATIENT
Start: 2022-10-24

## 2022-10-24 RX ORDER — LOSARTAN POTASSIUM 50 MG/1
50 TABLET ORAL DAILY
Qty: 90 TABLET | Refills: 0 | Status: SHIPPED | OUTPATIENT
Start: 2022-10-24

## 2022-11-03 ENCOUNTER — ANESTHESIA (OUTPATIENT)
Dept: ENDOSCOPY | Facility: HOSPITAL | Age: 47
End: 2022-11-03
Payer: COMMERCIAL

## 2022-11-03 ENCOUNTER — HOSPITAL ENCOUNTER (OUTPATIENT)
Facility: HOSPITAL | Age: 47
Setting detail: HOSPITAL OUTPATIENT SURGERY
Discharge: HOME | End: 2022-11-03
Attending: INTERNAL MEDICINE | Admitting: INTERNAL MEDICINE
Payer: COMMERCIAL

## 2022-11-03 ENCOUNTER — ANESTHESIA EVENT (OUTPATIENT)
Dept: ENDOSCOPY | Facility: HOSPITAL | Age: 47
End: 2022-11-03
Payer: COMMERCIAL

## 2022-11-03 VITALS
SYSTOLIC BLOOD PRESSURE: 138 MMHG | HEIGHT: 60 IN | HEART RATE: 85 BPM | RESPIRATION RATE: 10 BRPM | TEMPERATURE: 98 F | DIASTOLIC BLOOD PRESSURE: 98 MMHG | OXYGEN SATURATION: 100 % | BODY MASS INDEX: 43.19 KG/M2 | WEIGHT: 220 LBS

## 2022-11-03 DIAGNOSIS — Z12.11 SCREEN FOR COLON CANCER: ICD-10-CM

## 2022-11-03 LAB
B-HCG UR QL: NEGATIVE
GLUCOSE BLDC GLUCOMTR-MCNC: 131 MG/DL (ref 70–99)
GLUCOSE BLDC GLUCOMTR-MCNC: 78 MG/DL (ref 70–99)

## 2022-11-03 PROCEDURE — 45385 COLONOSCOPY W/LESION REMOVAL: CPT | Performed by: INTERNAL MEDICINE

## 2022-11-03 PROCEDURE — 0DBP8ZZ EXCISION OF RECTUM, VIA NATURAL OR ARTIFICIAL OPENING ENDOSCOPIC: ICD-10-PCS | Performed by: INTERNAL MEDICINE

## 2022-11-03 RX ORDER — LIDOCAINE HYDROCHLORIDE 10 MG/ML
INJECTION, SOLUTION EPIDURAL; INFILTRATION; INTRACAUDAL; PERINEURAL AS NEEDED
Status: DISCONTINUED | OUTPATIENT
Start: 2022-11-03 | End: 2022-11-03 | Stop reason: SURG

## 2022-11-03 RX ORDER — DEXTROSE MONOHYDRATE 25 G/50ML
50 INJECTION, SOLUTION INTRAVENOUS AS NEEDED
Status: DISCONTINUED | OUTPATIENT
Start: 2022-11-03 | End: 2022-11-03

## 2022-11-03 RX ORDER — DEXTROSE MONOHYDRATE 25 G/50ML
50 INJECTION, SOLUTION INTRAVENOUS
Status: DISCONTINUED | OUTPATIENT
Start: 2022-11-03 | End: 2022-11-03

## 2022-11-03 RX ORDER — SODIUM CHLORIDE, SODIUM LACTATE, POTASSIUM CHLORIDE, CALCIUM CHLORIDE 600; 310; 30; 20 MG/100ML; MG/100ML; MG/100ML; MG/100ML
INJECTION, SOLUTION INTRAVENOUS CONTINUOUS
Status: DISCONTINUED | OUTPATIENT
Start: 2022-11-03 | End: 2022-11-03

## 2022-11-03 RX ORDER — DEXTROSE MONOHYDRATE 25 G/50ML
25 INJECTION, SOLUTION INTRAVENOUS
Status: DISCONTINUED | OUTPATIENT
Start: 2022-11-03 | End: 2022-11-03

## 2022-11-03 RX ORDER — NICOTINE POLACRILEX 4 MG
15 LOZENGE BUCCAL
Status: DISCONTINUED | OUTPATIENT
Start: 2022-11-03 | End: 2022-11-03

## 2022-11-03 RX ORDER — NALOXONE HYDROCHLORIDE 0.4 MG/ML
80 INJECTION, SOLUTION INTRAMUSCULAR; INTRAVENOUS; SUBCUTANEOUS AS NEEDED
Status: DISCONTINUED | OUTPATIENT
Start: 2022-11-03 | End: 2022-11-03

## 2022-11-03 RX ORDER — NICOTINE POLACRILEX 4 MG
30 LOZENGE BUCCAL
Status: DISCONTINUED | OUTPATIENT
Start: 2022-11-03 | End: 2022-11-03

## 2022-11-03 RX ADMIN — SODIUM CHLORIDE, SODIUM LACTATE, POTASSIUM CHLORIDE, CALCIUM CHLORIDE: 600; 310; 30; 20 INJECTION, SOLUTION INTRAVENOUS at 12:00:00

## 2022-11-03 RX ADMIN — SODIUM CHLORIDE, SODIUM LACTATE, POTASSIUM CHLORIDE, CALCIUM CHLORIDE: 600; 310; 30; 20 INJECTION, SOLUTION INTRAVENOUS at 11:47:00

## 2022-11-03 RX ADMIN — LIDOCAINE HYDROCHLORIDE 50 MG: 10 INJECTION, SOLUTION EPIDURAL; INFILTRATION; INTRACAUDAL; PERINEURAL at 11:47:00

## 2022-11-03 NOTE — ANESTHESIA POSTPROCEDURE EVALUATION
Patient: Bettye Bassett    Procedure Summary     Date: 11/03/22 Room / Location: [address] [place] [address] ENDOSCOPY 05 / [address] [place] [address] ENDOSCOPY    Anesthesia Start: 9026 Anesthesia Stop: 9888    Procedure: COLONOSCOPY Diagnosis:       Screen for colon cancer      (polyps and hemorrhoids)    Surgeons:  Gila Tai MD Anesthesiologist: Holli Herr CRNA    Anesthesia Type: MAC ASA Status: 3          Anesthesia Type: MAC    Vitals Value Taken Time   /85 11/03/22 1202   Temp  11/03/22 1202   Pulse 88 11/03/22 1202   Resp 16 11/03/22 1202   SpO2 100 11/03/22 1202       [address] [place] [address] AN Post Evaluation:   Patient Evaluated in PACU  Patient Participation: complete - patient participated  Level of Consciousness: sleepy but conscious  Pain Score: 0  Pain Management: adequate  Airway Patency:patent  Dental exam unchanged from preop  Yes    Cardiovascular Status: stable  Respiratory Status: acceptable and nasal cannula  Postoperative Hydration stable      Honorio Wilson CRNA  11/3/2022 12:02 PM

## 2022-11-06 ENCOUNTER — HOSPITAL ENCOUNTER (OUTPATIENT)
Dept: ULTRASOUND IMAGING | Age: 47
Discharge: HOME | End: 2022-11-06
Attending: INTERNAL MEDICINE
Payer: COMMERCIAL

## 2022-11-06 ENCOUNTER — HOSPITAL ENCOUNTER (OUTPATIENT)
Dept: ULTRASOUND IMAGING | Age: 47
End: 2022-11-06
Attending: INTERNAL MEDICINE
Payer: COMMERCIAL

## 2022-11-06 DIAGNOSIS — E04.2 MULTINODULAR GOITER: ICD-10-CM

## 2022-11-06 PROCEDURE — 76536 US EXAM OF HEAD AND NECK: CPT | Performed by: INTERNAL MEDICINE

## 2022-11-09 ENCOUNTER — PATIENT MESSAGE (OUTPATIENT)
Dept: ENDOCRINOLOGY CLINIC | Facility: CLINIC | Age: 47
End: 2022-11-09

## 2022-11-09 DIAGNOSIS — E04.2 MULTIPLE THYROID NODULES: Primary | ICD-10-CM

## 2022-11-09 NOTE — TELEPHONE ENCOUNTER
From: Devendra Schilling  To: Ariela Childs MD  Sent: 11/9/2022 9:40 AM CST  Subject: Question regarding US THYROID (NEE=[id number])    I had my previous biospy down at [facility] years ago do i have to retake it?

## 2022-11-09 NOTE — PROGRESS NOTES
Colonoscopy recall 5 years    Dear Stephanie Ambrosio,    I reviewed the pathology report from the polyp(s) we completely removed during your recent colonoscopy. The polyp removed was an adenoma, which is a benign growth. However, these are the types of polyps that if not removed could become a colon cancer. All of your polyps were completely removed. The current health care guidelines recommend that patients with the size, number, and types of polyps you have should repeat their colonoscopy in 5 years to look for any new polyps that might have grown. If you have further questions please call me at [phone] or message me through [phone]     Sincerely,   Joseph Mars MD

## 2022-11-09 NOTE — TELEPHONE ENCOUNTER
Dr. Castellanos Royalty    Please advise.  I see patient had ultrasounds done in the past per care everywhere but no results of a biopsy

## 2022-11-10 ENCOUNTER — TELEPHONE (OUTPATIENT)
Facility: CLINIC | Age: 47
End: 2022-11-10

## 2022-11-10 NOTE — TELEPHONE ENCOUNTER
Lm on the pt's voice mail to call back or check her MyChart for Dr Chrissy Oliver message regarding her biopsy results

## 2022-11-10 NOTE — TELEPHONE ENCOUNTER
Recall colon in 5 years per Dr Gust Galeazzi. Colon done 11/03/2022.      Health maintenance updated and message sent to pt outreach to repeat colonoscopy in 5 years

## 2022-11-10 NOTE — TELEPHONE ENCOUNTER
----- Message from Jadiel Velasquez MD sent at 11/9/2022  4:54 PM CST -----  Colonoscopy recall 5 years    Dear Gadiel Savage,    I reviewed the pathology report from the polyp(s) we completely removed during your recent colonoscopy. The polyp removed was an adenoma, which is a benign growth. However, these are the types of polyps that if not removed could become a colon cancer. All of your polyps were completely removed. The current health care guidelines recommend that patients with the size, number, and types of polyps you have should repeat their colonoscopy in 5 years to look for any new polyps that might have grown. If you have further questions please call me at [phone] or message me through [phone]     Sincerely,   Kaelyn Perea MD

## 2022-11-19 ENCOUNTER — LAB ENCOUNTER (OUTPATIENT)
Dept: LAB | Facility: HOSPITAL | Age: 47
End: 2022-11-19
Attending: ALLERGY & IMMUNOLOGY
Payer: COMMERCIAL

## 2022-11-19 ENCOUNTER — OFFICE VISIT (OUTPATIENT)
Dept: ALLERGY | Facility: CLINIC | Age: 47
End: 2022-11-19
Payer: COMMERCIAL

## 2022-11-19 VITALS
HEART RATE: 91 BPM | DIASTOLIC BLOOD PRESSURE: 76 MMHG | HEIGHT: 64 IN | WEIGHT: 220 LBS | OXYGEN SATURATION: 100 % | SYSTOLIC BLOOD PRESSURE: 125 MMHG | BODY MASS INDEX: 37.56 KG/M2

## 2022-11-19 DIAGNOSIS — J30.2 SEASONAL AND PERENNIAL ALLERGIC RHINOCONJUNCTIVITIS: ICD-10-CM

## 2022-11-19 DIAGNOSIS — H10.10 SEASONAL AND PERENNIAL ALLERGIC RHINOCONJUNCTIVITIS: ICD-10-CM

## 2022-11-19 DIAGNOSIS — Z91.018 FOOD ALLERGY: ICD-10-CM

## 2022-11-19 DIAGNOSIS — Z92.29 COVID-19 VACCINE SERIES COMPLETED: ICD-10-CM

## 2022-11-19 DIAGNOSIS — Z23 FLU VACCINE NEED: ICD-10-CM

## 2022-11-19 DIAGNOSIS — L50.9 URTICARIA: ICD-10-CM

## 2022-11-19 DIAGNOSIS — K21.9 GASTROESOPHAGEAL REFLUX DISEASE, UNSPECIFIED WHETHER ESOPHAGITIS PRESENT: ICD-10-CM

## 2022-11-19 DIAGNOSIS — Z91.018 FOOD ALLERGY: Primary | ICD-10-CM

## 2022-11-19 DIAGNOSIS — J30.89 SEASONAL AND PERENNIAL ALLERGIC RHINOCONJUNCTIVITIS: ICD-10-CM

## 2022-11-19 LAB — C4 SERPL-MCNC: 39.7 MG/DL (ref 10–40)

## 2022-11-19 PROCEDURE — 86003 ALLG SPEC IGE CRUDE XTRC EA: CPT

## 2022-11-19 PROCEDURE — 99204 OFFICE O/P NEW MOD 45 MIN: CPT | Performed by: ALLERGY & IMMUNOLOGY

## 2022-11-19 PROCEDURE — 82785 ASSAY OF IGE: CPT

## 2022-11-19 PROCEDURE — 3074F SYST BP LT 130 MM HG: CPT | Performed by: ALLERGY & IMMUNOLOGY

## 2022-11-19 PROCEDURE — 86003 ALLG SPEC IGE CRUDE XTRC EA: CPT | Performed by: ALLERGY & IMMUNOLOGY

## 2022-11-19 PROCEDURE — 3008F BODY MASS INDEX DOCD: CPT | Performed by: ALLERGY & IMMUNOLOGY

## 2022-11-19 PROCEDURE — 86160 COMPLEMENT ANTIGEN: CPT

## 2022-11-19 PROCEDURE — 3078F DIAST BP <80 MM HG: CPT | Performed by: ALLERGY & IMMUNOLOGY

## 2022-11-19 PROCEDURE — 36415 COLL VENOUS BLD VENIPUNCTURE: CPT

## 2022-11-19 RX ORDER — LEVOCETIRIZINE DIHYDROCHLORIDE 5 MG/1
5 TABLET, FILM COATED ORAL EVERY EVENING
Qty: 90 TABLET | Refills: 1 | Status: SHIPPED | OUTPATIENT
Start: 2022-11-19

## 2022-11-19 NOTE — PATIENT INSTRUCTIONS
#1 Food allergy  See above clinical history. Symptoms include globus sensation of throat swelling, intermittent hives no specific food by history. Patient does also have a history of underlying GERD. See below. Check serum IgE to common food allergens as well as pineapple and strawberry based upon history  Patient defers EpiPen at this time  We will call with results  Handouts on oral allergy syndrome provided and reviewed    #2 GERD  Prior GI evaluation with endoscopy a few years ago. Currently using PPI as needed. Recommend to use PPI every day over the next 4 weeks to see if it helps with symptoms in case there is potential for trigger for throwing up and emesis  Follow-up with GI if not improving  Reviewed foods that can trigger GERD including hot food spicy foods acidic alcohol chocolate caffeine carbonation    #3 allergic rhinitis  Check serum IgE to environmental allergens  Trial of Xyzal, levocetirizine 5 mg once a day as an antihistamine  May add Nasacort 2 sprays per nostril once a day if having prominent nasal congestion postnasal drip  Reviewed avoidance measures and potential treatment option immunotherapy    #4 urticaria  Trial of Xyzal as needed. May take Xyzal once a day up to 4 times per day if needed  Check serum IgE to food and environmental allergens. #5 COVID vaccination up-to-date  6.   Flu vaccine up-to-date

## 2022-11-21 RX ORDER — PANTOPRAZOLE SODIUM 20 MG/1
TABLET, DELAYED RELEASE ORAL
Qty: 60 TABLET | Refills: 2 | Status: SHIPPED | OUTPATIENT
Start: 2022-11-21

## 2022-11-23 ENCOUNTER — TELEPHONE (OUTPATIENT)
Dept: ALLERGY | Facility: CLINIC | Age: 47
End: 2022-11-23

## 2022-11-23 LAB
A ALTERNATA IGE QN: <0.1 KUA/L (ref ?–0.1)
A FUMIGATUS IGE QN: <0.1 KUA/L (ref ?–0.1)
ALLERGEN, PINEAPPLE IGE: <0.1 KU/L
ALLERGEN, STRAWBERRY IGE: <0.1 KU/L
AMER SYCAMORE IGE QN: <0.1 KUA/L (ref ?–0.1)
BERMUDA GRASS IGE QN: <0.1 KUA/L (ref ?–0.1)
BOXELDER IGE QN: <0.1 KUA/L (ref ?–0.1)
C HERBARUM IGE QN: <0.1 KUA/L (ref ?–0.1)
CALIF WALNUT IGE QN: <0.1 KUA/L (ref ?–0.1)
CAT DANDER IGE QN: <0.1 KUA/L (ref ?–0.1)
CLAM IGE QN: <0.1 KUA/L (ref ?–0.1)
CMN PIGWEED IGE QN: <0.1 KUA/L (ref ?–0.1)
CODFISH IGE QN: <0.1 KUA/L (ref ?–0.1)
COMMON RAGWEED IGE QN: <0.1 KUA/L (ref ?–0.1)
CORN IGE QN: <0.1 KUA/L (ref ?–0.1)
COTTONWOOD IGE QN: <0.1 KUA/L (ref ?–0.1)
COW MILK IGE QN: <0.1 KUA/L (ref ?–0.1)
D FARINAE IGE QN: <0.1 KUA/L (ref ?–0.1)
D PTERONYSS IGE QN: <0.1 KUA/L (ref ?–0.1)
DOG DANDER IGE QN: <0.1 KUA/L (ref ?–0.1)
EGG WHITE IGE QN: <0.1 KUA/L (ref ?–0.1)
IGE SERPL-ACNC: 13.1 KU/L (ref 2–214)
IGE SERPL-ACNC: 13.8 KU/L (ref 2–214)
M RACEMOSUS IGE QN: <0.1 KUA/L (ref ?–0.1)
MARSH ELDER IGE QN: <0.1 KUA/L (ref ?–0.1)
MOUSE EPITH IGE QN: <0.1 KUA/L (ref ?–0.1)
MT JUNIPER IGE QN: <0.1 KUA/L (ref ?–0.1)
P NOTATUM IGE QN: <0.1 KUA/L (ref ?–0.1)
PEANUT IGE QN: <0.1 KUA/L (ref ?–0.1)
PECAN/HICK TREE IGE QN: <0.1 KUA/L (ref ?–0.1)
ROACH IGE QN: <0.1 KUA/L (ref ?–0.1)
SALTWORT IGE QN: <0.1 KUA/L (ref ?–0.1)
SCALLOP IGE QN: <0.1 KUA/L (ref ?–0.1)
SESAME SEED IGE QN: <0.1 KUA/L (ref ?–0.1)
SHRIMP IGE QN: <0.1 KUA/L (ref ?–0.1)
SOYBEAN IGE QN: <0.1 KUA/L (ref ?–0.1)
TIMOTHY IGE QN: <0.1 KUA/L (ref ?–0.1)
WALNUT IGE QN: <0.1 KUA/L (ref ?–0.1)
WHEAT IGE QN: <0.1 KUA/L (ref ?–0.1)
WHITE ASH IGE QN: <0.1 KUA/L (ref ?–0.1)
WHITE ELM IGE QN: <0.1 KUA/L (ref ?–0.1)
WHITE MULBERRY IGE QN: <0.1 KUA/L (ref ?–0.1)
WHITE OAK IGE QN: <0.1 KUA/L (ref ?–0.1)

## 2022-11-23 NOTE — TELEPHONE ENCOUNTER
----- Message from Uche Dee MD sent at 11/23/2022 12:40 PM CST -----  Please contact patient with negative serum IgE profile to common environmental allergens.

## 2022-11-23 NOTE — TELEPHONE ENCOUNTER
Pt contacted, confirmed name, and . Pt verbalizes understanding, and denies further questions. Discussed office visit recommendations from 2022. Discussed to keep a food diary if these symptoms return. Anything eaten within two hours of symptom onset would need to be evaluated. Patient verbalizes her understanding and is agreeable to plan of care.

## 2022-11-23 NOTE — TELEPHONE ENCOUNTER
----- Message from Anamaria Humphrey MD sent at 11/23/2022 12:40 PM CST -----  Please contact patient with negative serum IgE profile to food allergens

## 2022-11-23 NOTE — TELEPHONE ENCOUNTER
----- Message from Kendal Garnder MD sent at 11/23/2022  8:36 AM CST -----  Please contact patient with negative serum IgE to strawberry, pineapple

## 2022-11-23 NOTE — TELEPHONE ENCOUNTER
----- Message from Jed Payne MD sent at 11/21/2022  8:25 AM CST -----  Please call patient with normal C4 level 39.7

## 2022-12-01 ENCOUNTER — PATIENT MESSAGE (OUTPATIENT)
Dept: ENDOCRINOLOGY CLINIC | Facility: CLINIC | Age: 47
End: 2022-12-01

## 2022-12-07 RX ORDER — TIRZEPATIDE 10 MG/.5ML
10 INJECTION, SOLUTION SUBCUTANEOUS WEEKLY
Qty: 6 ML | Refills: 0 | Status: SHIPPED | OUTPATIENT
Start: 2022-12-07

## 2022-12-08 ENCOUNTER — MED REC SCAN ONLY (OUTPATIENT)
Facility: CLINIC | Age: 47
End: 2022-12-08

## 2022-12-11 ENCOUNTER — LAB ENCOUNTER (OUTPATIENT)
Dept: LAB | Facility: HOSPITAL | Age: 47
End: 2022-12-11
Attending: INTERNAL MEDICINE
Payer: COMMERCIAL

## 2022-12-11 DIAGNOSIS — E04.2 MULTIPLE THYROID NODULES: ICD-10-CM

## 2022-12-12 LAB — SARS-COV-2 RNA RESP QL NAA+PROBE: NOT DETECTED

## 2022-12-12 NOTE — DISCHARGE INSTRUCTIONS
Procedure performed by  ____________________    Biopsy/Aspiration of BILATERAL THYROID NODULES     DISCHARGE INSTRUCTIONS      Thyroid Biopsy: You may develop a sore throat after the procedure. If necessary,                               throat lozenges may be used to relieve the discomfort. Call your                               physician immediately if you experience increased swelling in your                                neck, difficulty breathing, or difficulty swallowing. DO NOT TAKE aspirin-containing products, Ibuprofen, Vitamin E, or                              blood thinning products for three (3) days after the procedure. You may                             take Tylenol (1 or 2 tablets every 4-6 hours) for mild discomfort at the                             biopsy site. Rest quietly for 24 hours, no physical activity. Avoid                              straining, heavy lifting, or strenuous physical activity for approximately                             2 days. Report any bleeding at aspiration/biopsy site, redness,                             swelling, odor, discharge, pain or fever that does not lessen after one                              day, to your physician. Resume a regular diet. Call your physician with                             questions or test results. Also you may contact the Radiology Nurse                             at [phone] with any additional questions or concerns. BRING THIS SHEET WITH YOU SHOULD YOU HAVE TO VISIT AN EMERGENCY ROOM OR SEE YOUR DOCTOR IN THE NEXT 24 HOURS. THANK YOU, WE WISH YOU WELL.

## 2022-12-14 ENCOUNTER — HOSPITAL ENCOUNTER (OUTPATIENT)
Dept: ULTRASOUND IMAGING | Facility: HOSPITAL | Age: 47
Discharge: HOME | End: 2022-12-14
Attending: INTERNAL MEDICINE
Payer: COMMERCIAL

## 2022-12-14 VITALS
HEIGHT: 64 IN | BODY MASS INDEX: 39.27 KG/M2 | SYSTOLIC BLOOD PRESSURE: 127 MMHG | DIASTOLIC BLOOD PRESSURE: 74 MMHG | HEART RATE: 74 BPM | RESPIRATION RATE: 18 BRPM | WEIGHT: 230 LBS

## 2022-12-14 DIAGNOSIS — E04.2 MULTIPLE THYROID NODULES: ICD-10-CM

## 2022-12-14 PROCEDURE — 88177 CYTP FNA EVAL EA ADDL: CPT | Performed by: INTERNAL MEDICINE

## 2022-12-14 PROCEDURE — 10005 FNA BX W/US GDN 1ST LES: CPT | Performed by: INTERNAL MEDICINE

## 2022-12-14 PROCEDURE — 10006 FNA BX W/US GDN EA ADDL: CPT | Performed by: INTERNAL MEDICINE

## 2022-12-14 PROCEDURE — 88172 CYTP DX EVAL FNA 1ST EA SITE: CPT | Performed by: INTERNAL MEDICINE

## 2022-12-14 PROCEDURE — 88173 CYTOPATH EVAL FNA REPORT: CPT | Performed by: INTERNAL MEDICINE

## 2022-12-14 NOTE — IMAGING NOTE
1350Pt arrived to ultrasound room # 1  History taken and as follows:  [address] ultrasound   HX HTN, DM ON MEDS, OBESITY. ALG/MEDS REVIEWED, NO CONTRADICTIONS FOR PROCEDURE. Procedure explained questions answered. Consent verified and obtained      1418  Scans taken by [facility]  ultrasound  Sonographer     1426  scans reviewed by Dr. Oli Hurley    [id number] MD IN W/PT PROCEDURE DISCUSSED  Q&A, R/B DISCUSSED,  PT AGREES W/POC.     1429 Sites marked   1ST SITE -LEFT THYROID  2ND SITE - LEFT ISTHMUS  3RD SITE -  RIGHT THYROID    1431  Time out taken      1432  Area cleaned sterile towels  to site. Pathology  was  PRESENT-NOTIFIED 3 SITES. 1435  Lidocaine 1% 10 milligrams per ml  from kit  was given  2.5  ml  TO LEFT THYROID SITE     SPECIMEN #1 LEFT THYROID   1436 FNA # 1 taken with 25 g needle  1438 FNA # 2 taken  with 25 g needle      SPECIMEN #2  LEFT ISTHMUS  1441 FNA # 1 taken with 25 g needle  6118 FNA # 2 taken  with 25 g needle      SPECIMEN #3 RIGHT THYROID   1446  Lidocaine 1% 10 milligrams per ml  from kit  was given 2.5 ml    TO RIGHT THYROID.   1448 FNA # 1 taken with 25 g needle  4183 FNA # 2 taken  with 25 g needle      1452 Procedure completed area re scanned . Area cleaned band aid to site ice pack to site. 1456 Post instructions given  verbal et written with AVS summary sheet provided to patient. Also instructed patient to refrain from drinking or eating anything hot for several hours after biopsy  to prevent increase bleeding from occurring.      [id number]  discharged  IN STABLE CONDITION

## 2022-12-16 RX ORDER — INSULIN GLARGINE 100 [IU]/ML
64 INJECTION, SOLUTION SUBCUTANEOUS NIGHTLY
Qty: 60 ML | Refills: 0 | Status: SHIPPED | OUTPATIENT
Start: 2022-12-16 | End: 2023-12-11

## 2023-01-20 ENCOUNTER — OFFICE VISIT (OUTPATIENT)
Dept: ENDOCRINOLOGY CLINIC | Facility: CLINIC | Age: 48
End: 2023-01-20

## 2023-01-20 VITALS
DIASTOLIC BLOOD PRESSURE: 82 MMHG | SYSTOLIC BLOOD PRESSURE: 125 MMHG | WEIGHT: 229 LBS | BODY MASS INDEX: 39 KG/M2 | HEART RATE: 85 BPM

## 2023-01-20 DIAGNOSIS — E11.9 DIABETES MELLITUS TYPE 2 WITHOUT RETINOPATHY (HCC): Primary | ICD-10-CM

## 2023-01-20 DIAGNOSIS — E78.5 HYPERLIPIDEMIA, UNSPECIFIED HYPERLIPIDEMIA TYPE: ICD-10-CM

## 2023-01-20 DIAGNOSIS — E04.2 MULTINODULAR GOITER: ICD-10-CM

## 2023-01-20 LAB
CARTRIDGE LOT#: ABNORMAL NUMERIC
GLUCOSE BLOOD: 204
HEMOGLOBIN A1C: 7.2 % (ref 4.3–5.6)
TEST STRIP LOT #: NORMAL NUMERIC

## 2023-01-20 PROCEDURE — 3051F HG A1C>EQUAL 7.0%<8.0%: CPT | Performed by: INTERNAL MEDICINE

## 2023-01-20 PROCEDURE — 99214 OFFICE O/P EST MOD 30 MIN: CPT | Performed by: INTERNAL MEDICINE

## 2023-01-20 PROCEDURE — 83036 HEMOGLOBIN GLYCOSYLATED A1C: CPT | Performed by: INTERNAL MEDICINE

## 2023-01-20 PROCEDURE — 3074F SYST BP LT 130 MM HG: CPT | Performed by: INTERNAL MEDICINE

## 2023-01-20 PROCEDURE — 82947 ASSAY GLUCOSE BLOOD QUANT: CPT | Performed by: INTERNAL MEDICINE

## 2023-01-20 PROCEDURE — 3079F DIAST BP 80-89 MM HG: CPT | Performed by: INTERNAL MEDICINE

## 2023-01-20 RX ORDER — INSULIN GLARGINE 100 [IU]/ML
64 INJECTION, SOLUTION SUBCUTANEOUS NIGHTLY
Qty: 60 ML | Refills: 0 | Status: CANCELLED | OUTPATIENT
Start: 2023-01-20 | End: 2024-01-15

## 2023-01-20 RX ORDER — GLIMEPIRIDE 4 MG/1
4 TABLET ORAL
Qty: 90 TABLET | Refills: 1 | Status: SHIPPED | OUTPATIENT
Start: 2023-01-20

## 2023-01-20 RX ORDER — EMPAGLIFLOZIN 25 MG/1
1 TABLET, FILM COATED ORAL DAILY
Qty: 90 TABLET | Refills: 1 | Status: SHIPPED | OUTPATIENT
Start: 2023-01-20

## 2023-01-20 RX ORDER — TIRZEPATIDE 12.5 MG/.5ML
12.5 INJECTION, SOLUTION SUBCUTANEOUS WEEKLY
Qty: 6 ML | Refills: 1 | Status: SHIPPED | OUTPATIENT
Start: 2023-01-20

## 2023-01-20 RX ORDER — INSULIN GLARGINE-YFGN 100 [IU]/ML
50 INJECTION, SOLUTION SUBCUTANEOUS DAILY
Qty: 45 ML | Refills: 1 | Status: SHIPPED | OUTPATIENT
Start: 2023-01-20

## 2023-01-26 RX ORDER — FLUCONAZOLE 150 MG/1
TABLET ORAL
Qty: 12 TABLET | Refills: 0 | Status: SHIPPED | OUTPATIENT
Start: 2023-01-26

## 2023-01-26 NOTE — TELEPHONE ENCOUNTER
LOV:1/20/23    RTC:4months    F/U:05/22/23     Pending Monthly Supply:orders pending, please approve if appropriate.

## 2023-01-30 RX ORDER — FLUCONAZOLE 150 MG/1
150 TABLET ORAL WEEKLY
Qty: 12 TABLET | Refills: 0 | Status: SHIPPED | OUTPATIENT
Start: 2023-01-30

## 2023-02-03 ENCOUNTER — TELEPHONE (OUTPATIENT)
Dept: ENDOCRINOLOGY CLINIC | Facility: CLINIC | Age: 48
End: 2023-02-03

## 2023-02-04 NOTE — TELEPHONE ENCOUNTER
Prior Auth department- please assist with PA process for [facility]. Thank you.

## 2023-02-10 NOTE — TELEPHONE ENCOUNTER
According to SATINDER tian below - she should be covered. She has already tried and failed Ozempic and Victoza. Thanks.

## 2023-02-10 NOTE — TELEPHONE ENCOUNTER
Received fax from [facility], [address]. Florencio Appl is not approved. \"The use of this medication for the treatment of type 2 diabetes mellitus without an inadequate treatment response to a GLP-1 receptor agonist (Dulaglutide, liraglutide, semaglutide)  does not establish medical necessity for this drug. Medical necessity is determined by adherence to generally accepted standards of medical practice in the [facility], is clinically appropriate and considered effective or patients illness. \"    Informed patient via [address] Sent to scan. Please advise if you want the patient to try alternatives.

## 2023-02-15 NOTE — TELEPHONE ENCOUNTER
[facility] pharmacy : Olinda Fajardo. Can fax statement of medical necessity to [facility], [address] program, Fax: [phone].     Reference# [id number]

## 2023-02-15 NOTE — TELEPHONE ENCOUNTER
Appeal letter and chart notes generated and faxed to [address] [place] [address], [address] program Fax: [phone]. Waiting for determination.

## 2023-02-17 NOTE — TELEPHONE ENCOUNTER
PA request has been approved for Mounjaro   From 01/17/23 to 02/16/24    Pt informed by phone and documents placed in yellow bag for scanning

## 2023-03-07 ENCOUNTER — APPOINTMENT (OUTPATIENT)
Dept: ULTRASOUND IMAGING | Facility: HOSPITAL | Age: 48
End: 2023-03-07
Attending: NURSE PRACTITIONER
Payer: COMMERCIAL

## 2023-03-07 ENCOUNTER — APPOINTMENT (OUTPATIENT)
Dept: CT IMAGING | Facility: HOSPITAL | Age: 48
End: 2023-03-07
Attending: NURSE PRACTITIONER
Payer: COMMERCIAL

## 2023-03-07 ENCOUNTER — HOSPITAL ENCOUNTER (EMERGENCY)
Facility: HOSPITAL | Age: 48
Discharge: HOME | End: 2023-03-07
Payer: COMMERCIAL

## 2023-03-07 VITALS
SYSTOLIC BLOOD PRESSURE: 146 MMHG | DIASTOLIC BLOOD PRESSURE: 85 MMHG | WEIGHT: 225 LBS | BODY MASS INDEX: 38.41 KG/M2 | HEIGHT: 64 IN | RESPIRATION RATE: 20 BRPM | OXYGEN SATURATION: 99 % | HEART RATE: 76 BPM | TEMPERATURE: 98 F

## 2023-03-07 DIAGNOSIS — R10.11 RUQ PAIN: Primary | ICD-10-CM

## 2023-03-07 LAB
ALBUMIN SERPL-MCNC: 4 G/DL (ref 3.4–5)
ALBUMIN/GLOB SERPL: 1 {RATIO} (ref 1–2)
ALP LIVER SERPL-CCNC: 77 U/L
ALT SERPL-CCNC: 22 U/L
ANION GAP SERPL CALC-SCNC: 5 MMOL/L (ref 0–18)
AST SERPL-CCNC: 14 U/L (ref 15–37)
B-HCG UR QL: NEGATIVE
BASOPHILS # BLD AUTO: 0.05 X10(3) UL (ref 0–0.2)
BASOPHILS NFR BLD AUTO: 0.6 %
BILIRUB SERPL-MCNC: 0.6 MG/DL (ref 0.1–2)
BILIRUB UR QL: NEGATIVE
BUN BLD-MCNC: 11 MG/DL (ref 7–18)
BUN/CREAT SERPL: 13.9 (ref 10–20)
CALCIUM BLD-MCNC: 9.4 MG/DL (ref 8.5–10.1)
CHLORIDE SERPL-SCNC: 109 MMOL/L (ref 98–112)
CLARITY UR: CLEAR
CO2 SERPL-SCNC: 26 MMOL/L (ref 21–32)
CREAT BLD-MCNC: 0.79 MG/DL
DEPRECATED RDW RBC AUTO: 38.5 FL (ref 35.1–46.3)
EOSINOPHIL # BLD AUTO: 0.21 X10(3) UL (ref 0–0.7)
EOSINOPHIL NFR BLD AUTO: 2.6 %
ERYTHROCYTE [DISTWIDTH] IN BLOOD BY AUTOMATED COUNT: 13.4 % (ref 11–15)
GFR SERPLBLD BASED ON 1.73 SQ M-ARVRAT: 94 ML/MIN/1.73M2 (ref 60–?)
GLOBULIN PLAS-MCNC: 4 G/DL (ref 2.8–4.4)
GLUCOSE BLD-MCNC: 80 MG/DL (ref 70–99)
GLUCOSE BLDC GLUCOMTR-MCNC: 70 MG/DL (ref 70–99)
GLUCOSE UR-MCNC: >1000 MG/DL
HCT VFR BLD AUTO: 42.6 %
HGB BLD-MCNC: 14.4 G/DL
IMM GRANULOCYTES # BLD AUTO: 0.02 X10(3) UL (ref 0–1)
IMM GRANULOCYTES NFR BLD: 0.2 %
KETONES UR-MCNC: NEGATIVE MG/DL
LEUKOCYTE ESTERASE UR QL STRIP.AUTO: NEGATIVE
LIPASE SERPL-CCNC: 20 U/L (ref 13–75)
LIPASE SERPL-CCNC: 84 U/L (ref 73–393)
LYMPHOCYTES # BLD AUTO: 4.28 X10(3) UL (ref 1–4)
LYMPHOCYTES NFR BLD AUTO: 52.2 %
MCH RBC QN AUTO: 27.3 PG (ref 26–34)
MCHC RBC AUTO-ENTMCNC: 33.8 G/DL (ref 31–37)
MCV RBC AUTO: 80.7 FL
MONOCYTES # BLD AUTO: 0.57 X10(3) UL (ref 0.1–1)
MONOCYTES NFR BLD AUTO: 7 %
NEUTROPHILS # BLD AUTO: 3.07 X10 (3) UL (ref 1.5–7.7)
NEUTROPHILS # BLD AUTO: 3.07 X10(3) UL (ref 1.5–7.7)
NEUTROPHILS NFR BLD AUTO: 37.4 %
NITRITE UR QL STRIP.AUTO: NEGATIVE
OSMOLALITY SERPL CALC.SUM OF ELEC: 288 MOSM/KG (ref 275–295)
PH UR: 6.5 [PH] (ref 5–8)
PLATELET # BLD AUTO: 367 10(3)UL (ref 150–450)
POTASSIUM SERPL-SCNC: 3.5 MMOL/L (ref 3.5–5.1)
PROT SERPL-MCNC: 8 G/DL (ref 6.4–8.2)
PROT UR-MCNC: NEGATIVE MG/DL
RBC # BLD AUTO: 5.28 X10(6)UL
SODIUM SERPL-SCNC: 140 MMOL/L (ref 136–145)
SP GR UR STRIP: >1.03 (ref 1–1.03)
UROBILINOGEN UR STRIP-ACNC: NORMAL
WBC # BLD AUTO: 8.2 X10(3) UL (ref 4–11)

## 2023-03-07 PROCEDURE — 83690 ASSAY OF LIPASE: CPT | Performed by: NURSE PRACTITIONER

## 2023-03-07 PROCEDURE — 99284 EMERGENCY DEPT VISIT MOD MDM: CPT

## 2023-03-07 PROCEDURE — 80053 COMPREHEN METABOLIC PANEL: CPT

## 2023-03-07 PROCEDURE — 96375 TX/PRO/DX INJ NEW DRUG ADDON: CPT

## 2023-03-07 PROCEDURE — 96361 HYDRATE IV INFUSION ADD-ON: CPT

## 2023-03-07 PROCEDURE — 82962 GLUCOSE BLOOD TEST: CPT

## 2023-03-07 PROCEDURE — 96376 TX/PRO/DX INJ SAME DRUG ADON: CPT

## 2023-03-07 PROCEDURE — 74176 CT ABD & PELVIS W/O CONTRAST: CPT | Performed by: NURSE PRACTITIONER

## 2023-03-07 PROCEDURE — 81001 URINALYSIS AUTO W/SCOPE: CPT

## 2023-03-07 PROCEDURE — 81025 URINE PREGNANCY TEST: CPT

## 2023-03-07 PROCEDURE — 96374 THER/PROPH/DIAG INJ IV PUSH: CPT

## 2023-03-07 PROCEDURE — 85025 COMPLETE CBC W/AUTO DIFF WBC: CPT

## 2023-03-07 PROCEDURE — S0028 INJECTION, FAMOTIDINE, 20 MG: HCPCS | Performed by: NURSE PRACTITIONER

## 2023-03-07 PROCEDURE — 76705 ECHO EXAM OF ABDOMEN: CPT | Performed by: NURSE PRACTITIONER

## 2023-03-07 RX ORDER — FAMOTIDINE 10 MG/ML
20 INJECTION, SOLUTION INTRAVENOUS ONCE
Status: COMPLETED | OUTPATIENT
Start: 2023-03-07 | End: 2023-03-07

## 2023-03-07 RX ORDER — ONDANSETRON 2 MG/ML
4 INJECTION INTRAMUSCULAR; INTRAVENOUS ONCE
Status: COMPLETED | OUTPATIENT
Start: 2023-03-07 | End: 2023-03-07

## 2023-03-07 RX ORDER — DICYCLOMINE HCL 20 MG
20 TABLET ORAL 4 TIMES DAILY PRN
Qty: 30 TABLET | Refills: 0 | Status: SHIPPED | OUTPATIENT
Start: 2023-03-07 | End: 2023-04-06

## 2023-03-07 RX ORDER — PANTOPRAZOLE SODIUM 20 MG/1
20 TABLET, DELAYED RELEASE ORAL DAILY
Qty: 30 TABLET | Refills: 0 | Status: SHIPPED | OUTPATIENT
Start: 2023-03-07 | End: 2023-04-06

## 2023-03-07 RX ORDER — KETOROLAC TROMETHAMINE 15 MG/ML
15 INJECTION, SOLUTION INTRAMUSCULAR; INTRAVENOUS ONCE
Status: COMPLETED | OUTPATIENT
Start: 2023-03-07 | End: 2023-03-07

## 2023-03-17 ENCOUNTER — OFFICE VISIT (OUTPATIENT)
Dept: ENDOCRINOLOGY CLINIC | Facility: CLINIC | Age: 48
End: 2023-03-17

## 2023-03-17 VITALS
DIASTOLIC BLOOD PRESSURE: 84 MMHG | SYSTOLIC BLOOD PRESSURE: 128 MMHG | BODY MASS INDEX: 38 KG/M2 | HEART RATE: 93 BPM | WEIGHT: 220 LBS

## 2023-03-17 DIAGNOSIS — E11.9 DIABETES MELLITUS TYPE 2 WITHOUT RETINOPATHY (HCC): Primary | ICD-10-CM

## 2023-03-17 LAB
CARTRIDGE LOT#: ABNORMAL NUMERIC
GLUCOSE BLOOD: 194
HEMOGLOBIN A1C: 6.2 % (ref 4.3–5.6)
TEST STRIP LOT #: NORMAL NUMERIC

## 2023-03-17 PROCEDURE — 82947 ASSAY GLUCOSE BLOOD QUANT: CPT | Performed by: NURSE PRACTITIONER

## 2023-03-17 PROCEDURE — 3079F DIAST BP 80-89 MM HG: CPT | Performed by: NURSE PRACTITIONER

## 2023-03-17 PROCEDURE — 3044F HG A1C LEVEL LT 7.0%: CPT | Performed by: NURSE PRACTITIONER

## 2023-03-17 PROCEDURE — 3074F SYST BP LT 130 MM HG: CPT | Performed by: NURSE PRACTITIONER

## 2023-03-17 PROCEDURE — 99213 OFFICE O/P EST LOW 20 MIN: CPT | Performed by: NURSE PRACTITIONER

## 2023-03-17 PROCEDURE — 83036 HEMOGLOBIN GLYCOSYLATED A1C: CPT | Performed by: NURSE PRACTITIONER

## 2023-03-17 RX ORDER — DOCUSATE SODIUM 100 MG/1
100 CAPSULE, LIQUID FILLED ORAL 2 TIMES DAILY PRN
Qty: 60 CAPSULE | Refills: 0 | Status: SHIPPED | OUTPATIENT
Start: 2023-03-17 | End: 2023-04-16

## 2023-03-17 NOTE — PATIENT INSTRUCTIONS
A1C: 6.2% today -->decreased from 7.2% on 1/20/2023  Blood glucose: 194 in clinic today    Medications:   -continue with Lantus 50 units SQ daily  -continue to hold Mounjaro for now   -continue with Jardiance 25mg PO daily    - increase water with goal 60-80oz per day   -continue with Glimepiride 4mg PO daily    - [facility] an [facility] how you are feeling and blood glucose readings in 1-2 weeks. Sooner if you notice BG rise >180 persistently. - schedule apt with Dr. Linda Mon for right upper abd pain    A1C goal:   <7.0%    Blood sugar testing:  Test your blood sugar 1 time daily   Recommended times to test: alternate with before breakfast (fasting) or 2hrs after meals     Blood sugar targets:  Before breakfast:   (preferably < 110)  Before meals OR 2 hours after meals: <150    Call for persistent blood sugars < 75 or > 200.

## 2023-04-02 NOTE — TELEPHONE ENCOUNTER
LOV: 3/17/2023  RTC: 2M, upcoming 5/22/2023  Last refill: 10/24/2022    Dr. Tobi Alonso review and sign pended prescription if agreeable.

## 2023-04-03 RX ORDER — LOSARTAN POTASSIUM 50 MG/1
50 TABLET ORAL DAILY
Qty: 90 TABLET | Refills: 0 | Status: SHIPPED | OUTPATIENT
Start: 2023-04-03

## 2023-05-09 NOTE — TELEPHONE ENCOUNTER
LOV 03/11/21. RTC 4 months. No f/u. Sent navigayat message. Pended 3 month supply. Opzelura Pregnancy And Lactation Text: There is insufficient data to evaluate drug-associated risk for major birth defects, miscarriage, or other adverse maternal or fetal outcomes.  There is a pregnancy registry that monitors pregnancy outcomes in pregnant persons exposed to the medication during pregnancy.  It is unknown if this medication is excreted in breast milk.  Do not breastfeed during treatment and for about 4 weeks after the last dose.

## 2023-05-22 ENCOUNTER — TELEPHONE (OUTPATIENT)
Dept: ENDOCRINOLOGY CLINIC | Facility: CLINIC | Age: 48
End: 2023-05-22

## 2023-05-22 ENCOUNTER — OFFICE VISIT (OUTPATIENT)
Dept: ENDOCRINOLOGY CLINIC | Facility: CLINIC | Age: 48
End: 2023-05-22

## 2023-05-22 VITALS
HEART RATE: 94 BPM | WEIGHT: 230 LBS | DIASTOLIC BLOOD PRESSURE: 85 MMHG | BODY MASS INDEX: 39 KG/M2 | SYSTOLIC BLOOD PRESSURE: 124 MMHG

## 2023-05-22 DIAGNOSIS — E11.9 DIABETES MELLITUS TYPE 2 WITHOUT RETINOPATHY (HCC): Primary | ICD-10-CM

## 2023-05-22 LAB
CARTRIDGE LOT#: ABNORMAL NUMERIC
GLUCOSE BLOOD: 184
HEMOGLOBIN A1C: 8.2 % (ref 4.3–5.6)
TEST STRIP LOT #: NORMAL NUMERIC

## 2023-05-22 PROCEDURE — 3074F SYST BP LT 130 MM HG: CPT | Performed by: INTERNAL MEDICINE

## 2023-05-22 PROCEDURE — 99214 OFFICE O/P EST MOD 30 MIN: CPT | Performed by: INTERNAL MEDICINE

## 2023-05-22 PROCEDURE — 82947 ASSAY GLUCOSE BLOOD QUANT: CPT | Performed by: INTERNAL MEDICINE

## 2023-05-22 PROCEDURE — 3052F HG A1C>EQUAL 8.0%<EQUAL 9.0%: CPT | Performed by: INTERNAL MEDICINE

## 2023-05-22 PROCEDURE — 3079F DIAST BP 80-89 MM HG: CPT | Performed by: INTERNAL MEDICINE

## 2023-05-22 PROCEDURE — 83036 HEMOGLOBIN GLYCOSYLATED A1C: CPT | Performed by: INTERNAL MEDICINE

## 2023-05-22 RX ORDER — EMPAGLIFLOZIN 25 MG/1
1 TABLET, FILM COATED ORAL DAILY
Qty: 90 TABLET | Refills: 1 | Status: SHIPPED | OUTPATIENT
Start: 2023-05-22

## 2023-05-22 RX ORDER — ROSUVASTATIN CALCIUM 20 MG/1
20 TABLET, COATED ORAL NIGHTLY
Qty: 90 TABLET | Refills: 1 | Status: SHIPPED | OUTPATIENT
Start: 2023-05-22

## 2023-05-22 RX ORDER — GLIMEPIRIDE 4 MG/1
4 TABLET ORAL
Qty: 90 TABLET | Refills: 1 | Status: SHIPPED | OUTPATIENT
Start: 2023-05-22

## 2023-05-22 RX ORDER — TIRZEPATIDE 7.5 MG/.5ML
7.5 INJECTION, SOLUTION SUBCUTANEOUS WEEKLY
Qty: 6 ML | Refills: 1 | Status: SHIPPED | OUTPATIENT
Start: 2023-05-22 | End: 2023-05-22

## 2023-05-22 RX ORDER — TIRZEPATIDE 7.5 MG/.5ML
7.5 INJECTION, SOLUTION SUBCUTANEOUS WEEKLY
Qty: 6 ML | Refills: 1 | Status: SHIPPED | OUTPATIENT
Start: 2023-05-22

## 2023-05-23 ENCOUNTER — TELEPHONE (OUTPATIENT)
Dept: ENDOCRINOLOGY CLINIC | Facility: CLINIC | Age: 48
End: 2023-05-23

## 2023-05-23 NOTE — TELEPHONE ENCOUNTER
PA submitted via [employer]. Per automation:    Prescription within prescribing limits. Prior Authorization not required for PATIENT/MEDICATION. Used when there are coverage rules in place, but the prescriber is not going outside of them    Notes:    No PA required, Prescription is within prescribing limits.

## 2023-06-07 RX ORDER — PERPHENAZINE 16 MG/1
TABLET, FILM COATED ORAL
Qty: 200 STRIP | Refills: 1 | Status: SHIPPED | OUTPATIENT
Start: 2023-06-07

## 2023-06-17 ENCOUNTER — LAB ENCOUNTER (OUTPATIENT)
Dept: LAB | Facility: HOSPITAL | Age: 48
End: 2023-06-17
Attending: INTERNAL MEDICINE
Payer: COMMERCIAL

## 2023-06-17 DIAGNOSIS — E11.9 DIABETES MELLITUS TYPE 2 WITHOUT RETINOPATHY (HCC): ICD-10-CM

## 2023-06-17 LAB
ALBUMIN SERPL-MCNC: 3.5 G/DL (ref 3.4–5)
ALBUMIN/GLOB SERPL: 0.9 {RATIO} (ref 1–2)
ALP LIVER SERPL-CCNC: 70 U/L
ALT SERPL-CCNC: 24 U/L
ANION GAP SERPL CALC-SCNC: 5 MMOL/L (ref 0–18)
AST SERPL-CCNC: 15 U/L (ref 15–37)
BASOPHILS # BLD AUTO: 0.04 X10(3) UL (ref 0–0.2)
BASOPHILS NFR BLD AUTO: 0.5 %
BILIRUB SERPL-MCNC: 0.4 MG/DL (ref 0.1–2)
BUN BLD-MCNC: 18 MG/DL (ref 7–18)
BUN/CREAT SERPL: 25.7 (ref 10–20)
CALCIUM BLD-MCNC: 8.7 MG/DL (ref 8.5–10.1)
CHLORIDE SERPL-SCNC: 109 MMOL/L (ref 98–112)
CHOLEST SERPL-MCNC: 135 MG/DL (ref ?–200)
CO2 SERPL-SCNC: 26 MMOL/L (ref 21–32)
CREAT BLD-MCNC: 0.7 MG/DL
CREAT UR-SCNC: 79.5 MG/DL
DEPRECATED RDW RBC AUTO: 39.6 FL (ref 35.1–46.3)
EOSINOPHIL # BLD AUTO: 0.15 X10(3) UL (ref 0–0.7)
EOSINOPHIL NFR BLD AUTO: 1.9 %
ERYTHROCYTE [DISTWIDTH] IN BLOOD BY AUTOMATED COUNT: 13.5 % (ref 11–15)
FASTING PATIENT LIPID ANSWER: YES
FASTING STATUS PATIENT QL REPORTED: YES
GFR SERPLBLD BASED ON 1.73 SQ M-ARVRAT: 109 ML/MIN/1.73M2 (ref 60–?)
GLOBULIN PLAS-MCNC: 3.9 G/DL (ref 2.8–4.4)
GLUCOSE BLD-MCNC: 120 MG/DL (ref 70–99)
HCT VFR BLD AUTO: 40.9 %
HDLC SERPL-MCNC: 47 MG/DL (ref 40–59)
HGB BLD-MCNC: 13.5 G/DL
IMM GRANULOCYTES # BLD AUTO: 0.02 X10(3) UL (ref 0–1)
IMM GRANULOCYTES NFR BLD: 0.3 %
LDLC SERPL CALC-MCNC: 72 MG/DL (ref ?–100)
LYMPHOCYTES # BLD AUTO: 2.98 X10(3) UL (ref 1–4)
LYMPHOCYTES NFR BLD AUTO: 37.8 %
MCH RBC QN AUTO: 26.8 PG (ref 26–34)
MCHC RBC AUTO-ENTMCNC: 33 G/DL (ref 31–37)
MCV RBC AUTO: 81.2 FL
MICROALBUMIN UR-MCNC: 0.56 MG/DL
MICROALBUMIN/CREAT 24H UR-RTO: 7 UG/MG (ref ?–30)
MONOCYTES # BLD AUTO: 0.61 X10(3) UL (ref 0.1–1)
MONOCYTES NFR BLD AUTO: 7.7 %
NEUTROPHILS # BLD AUTO: 4.09 X10 (3) UL (ref 1.5–7.7)
NEUTROPHILS # BLD AUTO: 4.09 X10(3) UL (ref 1.5–7.7)
NEUTROPHILS NFR BLD AUTO: 51.8 %
NONHDLC SERPL-MCNC: 88 MG/DL (ref ?–130)
OSMOLALITY SERPL CALC.SUM OF ELEC: 293 MOSM/KG (ref 275–295)
PLATELET # BLD AUTO: 330 10(3)UL (ref 150–450)
POTASSIUM SERPL-SCNC: 3.6 MMOL/L (ref 3.5–5.1)
PROT SERPL-MCNC: 7.4 G/DL (ref 6.4–8.2)
RBC # BLD AUTO: 5.04 X10(6)UL
SODIUM SERPL-SCNC: 140 MMOL/L (ref 136–145)
TRIGL SERPL-MCNC: 80 MG/DL (ref 30–149)
TSI SER-ACNC: 0.54 MIU/ML (ref 0.36–3.74)
VLDLC SERPL CALC-MCNC: 12 MG/DL (ref 0–30)
WBC # BLD AUTO: 7.9 X10(3) UL (ref 4–11)

## 2023-06-17 PROCEDURE — 82570 ASSAY OF URINE CREATININE: CPT

## 2023-06-17 PROCEDURE — 80061 LIPID PANEL: CPT

## 2023-06-17 PROCEDURE — 84443 ASSAY THYROID STIM HORMONE: CPT

## 2023-06-17 PROCEDURE — 3061F NEG MICROALBUMINURIA REV: CPT | Performed by: INTERNAL MEDICINE

## 2023-06-17 PROCEDURE — 85025 COMPLETE CBC W/AUTO DIFF WBC: CPT

## 2023-06-17 PROCEDURE — 80053 COMPREHEN METABOLIC PANEL: CPT

## 2023-06-17 PROCEDURE — 36415 COLL VENOUS BLD VENIPUNCTURE: CPT

## 2023-06-17 PROCEDURE — 82043 UR ALBUMIN QUANTITATIVE: CPT

## 2023-06-24 NOTE — TELEPHONE ENCOUNTER
Occupational Therapy    Visit Type: initial evaluation    Relevant History/Co-morbidities: 06/23 - admitted s/p fall while transferring from w/c to bed  Dx: left proximal tibia fracture  Per ortho treating non-op, LLE NWB in knee immobilizer     PMHx: CAD, Dm2, HTN, afib on coumadin,  Stroke with residual L hemiparesis, wheelchair bound     SUBJECTIVE  Patient agreed to participate in therapy this date.  Patient / Family Goal: return home and maximize function    Pain   RN informed on pain level.    At onset of session (out of 10): 0    OBJECTIVE     Cognitive Status   Level of Consciousness   - alert  Affect/Behavior    - calm and cooperative  Orientation    - Oriented to: person, place, time and situation  Functional Communication   - Overall Status: within functional limits  Transition Between Tasks   - transitions without difficulty  [facility] Cognition Screen:    1. Following/understanding a 10 to 15 minute speech or presentation (e.g., lesson at a place of [employer], guest lecturer at a senior center?  None (4)    2. Understanding familiar people during ordinary conversations?  None (4)    3. Remembering to take medications at the appropriate time?  None (4)    4. Remembering where things were placed or put away (e.g., keys)?  None (4)    5. Remembering a list of 4 or 5 errands without writing it down?  None (4)    6. Taking care of complicated tasks like managing a checking account or getting appliances fixed?  A little (3)    AM-PAC Cognition Screen:  Cognition Score: 24/24  Cognition Interpretation: no impairment suspected     Range of Motion (ROM)   (degrees unless noted; active unless noted; norms in ( ); negative=lacking to 0, positive=beyond 0)  WFL: LUE, RUE, except as noted  Shoulder:    - Flexion (180):        • Left:70         • Right: 90    Strength  (out of 5 unless noted, standard test position unless noted)   Shoulder:     - Flexion:         • Left: 2+         • Right: 3   Elbow/Forearm:    -  Per patient her pharmacy told her that Novolog is discontinued and Humalog is now the replacement. Patient is out of medication. Flexion:        • Left: 3        • Right: 3+     - Extension:        • Left: 3        • Right: 3+   Gross : left  less than right      Sitting Balance  (MINH = base of support)  Static      - Trial 1 details: stand by assist, with back unsupported and with double UE support       Bed Mobility  - Rolling right: total assist - dependent , with verbal cues  - Supine to sit: total assist - dependent , 2 persons, with verbal cues  - Sit to supine: total assist - dependent , 2 persons, with verbal cues      Activities of Daily Living (ADLs)  Grooming/Oral Hygiene:   - Grooming assist: minimal assist  - Position: supine/bed  - Assist needed for: set up, increased time to complete, wash/dry hands and wash/dry face  Lower Body Dressing:   - Footwear:       - Assistance: total assist - dependent        - Position: edge of bed       - Type: socks  - Assist needed for: set up, increased time to complete, don/doff left sock and don/doff right sock  Toileting:   - Assist: total assist - dependent  (purewick)  - Assist needed for: clothing management up, clothing management down and perineal hygiene     Interventions    Training provided: activity tolerance, ADL training, balance retraining, bed mobility training, body mechanics, compensatory techniques, safety training, transfer training and positioning  Skilled input: verbal instruction/cues and tactile instruction/cues  Verbal Consent: Writer verbally educated and received verbal consent for hand placement, positioning of patient, and techniques to be performed today from patient for clothing adjustments for techniques and therapist position for techniques as described above and how they are pertinent to the patient's plan of care.  Additional Intervention Details:   Pt encouraged to perform UE/RLE exercises throughout the day to prevent deconditioning & maintain joint capsule integrity. Pt educated on OT POC & dc recommendations; pt verbalized understanding of all  education/instruction provided & reported no further questions/concerns at end of session.         Education:   - Present and ready to learn: patient  Education provided during session:  - Results of above outlined education: Verbalizes understanding    ASSESSMENT   Interferring components: weight bearing status, decreased activity tolerance and medical precautions    Summary of function and discharge needs based on today's assessment:  - Current level of function: slightly below baseline level of function  - Therapy needs at discharge: does not require ongoing therapy  - Activities of daily living (ADLs) requiring support at discharge: bathing, transfers, ambulation, toileting and dressing  - Instrumental activities of daily living (IADLs) requiring support at discharge: community mobility  - Impairments that require further therapy intervention: balance, ROM, strength, activity tolerance and motor planning  AM-PAC  - Prior Level of Function: Needs > MOD A (AMPAC <12)       Key: MOD A=moderate assistance, IND/MOD I=independent/modified independent  - Generalized Current Level of Function     - Current Self-Cares: 12       Scoring Key= >21 Modified Independent; 20-21 Supervision; 18-19 Minimal assist; 13-18 Moderate assist; 9-12 Max assist; <9 Total assist       • Personal Occupations Profile Affected: bathing/showering, lower body dressing, functional mobility/transfers, toileting/toilet hygiene, community mobility     • Clinical decision making: Low - Patient has few limitations (1-3), comorbidities and/or complexities, as noted in problem focused assessment noted above, that impact their occupational profile.  Resulting in few treatment options and no task modification consistent with low clinical decision making complexity.    Pain at End of Session: RN informed on pain level  Pain: 0/10    PLAN  Suggestions for next session as indicated:     OT Frequency: DC OT      PT/OT Mobility Equipment for Discharge: pt  owns oziel lift, hospital bed, wheelchair  PT/OT ADL Equipment for Discharge: none (pt toilets & bathes in bed w/ assist from dtr)  Agreement to plan and goals: patient agrees with goals and treatment plan      Documented in the chart in the following areas: Assessment/Plan.    Patient at End of Session:   Location: in bed  Safety measures: call light within reach, lines intact and alarm system in place/re-engaged  Handoff to: nurse      Therapy procedure time and total treatment time can be found documented on the Time Entry flowsheet

## 2023-07-17 ENCOUNTER — PATIENT MESSAGE (OUTPATIENT)
Dept: ENDOCRINOLOGY CLINIC | Facility: CLINIC | Age: 48
End: 2023-07-17

## 2023-07-21 ENCOUNTER — TELEPHONE (OUTPATIENT)
Dept: ENDOCRINOLOGY CLINIC | Facility: CLINIC | Age: 48
End: 2023-07-21

## 2023-07-21 NOTE — TELEPHONE ENCOUNTER
Spoke to patient, Raymond Rascon was being billed to [employer], used her insurance and costing 300$. States she has savings card, PA started to see if PA can expedite the savings card.

## 2023-07-21 NOTE — TELEPHONE ENCOUNTER
Patient is calling states that she is trying to get the following medication. States that the medication is very expensive  Patient is on her last dose of medication       Tirzepatide (MOUNJARO) 7.5 MG/0.5ML Subcutaneous Solution Pen-injector6 15/22/2023Sig - Route: Inject 7.5 mg into the skin once a week.  - SubcutaneousSent to pharmacy as: Omayra Solano 7.5 MG/0.5ML Subcutaneous Solution Pen-injector (Tirzepatide)Notes to Pharmacy: ICD-10 code E11.9 type 2 diabetesE-Prescribing Status: Receipt confirmed by pharmacy (5/22/2023  2:19 PM CDT)

## 2023-07-24 RX ORDER — TIRZEPATIDE 7.5 MG/.5ML
7.5 INJECTION, SOLUTION SUBCUTANEOUS WEEKLY
Qty: 6 ML | Refills: 1 | Status: SHIPPED | OUTPATIENT
Start: 2023-07-24

## 2023-07-24 NOTE — TELEPHONE ENCOUNTER
From: Ethan Wilson  To: Reina Fowler MD  Sent: 7/17/2023 6:28 PM CDT  Subject: Medication     Good Evening I just spoke with the pharmacy about my Mounjaro 7.5 and they said the coupon only covers a certain amount. Previously they were charging the rest to my  insurance which has ended because he no longer works there. Running it through my insurance alone leaves a steep balance. What can I do about the medication do we have to change it?

## 2023-07-24 NOTE — TELEPHONE ENCOUNTER
Sallie Lomeli from [facility]-   1/17/23- 2/16/24    Spoke to pharmacist, communicated approved PA. Using [employer] insurance (patient's insurance) states that Rx needs Dx code and possible deductible issue to use savings card. Pharmacy agreed to get new Rx and to follow up with patient    Rx sent per written order with Dx code.

## 2023-07-25 ENCOUNTER — PATIENT MESSAGE (OUTPATIENT)
Dept: INTERNAL MEDICINE CLINIC | Facility: CLINIC | Age: 48
End: 2023-07-25

## 2023-07-25 DIAGNOSIS — Z12.31 BREAST CANCER SCREENING BY MAMMOGRAM: Primary | ICD-10-CM

## 2023-07-25 NOTE — TELEPHONE ENCOUNTER
Signed pended mammogram-thank you for pending  Thank you for reminding patient for scheduling the annual physical

## 2023-07-25 NOTE — TELEPHONE ENCOUNTER
From: Alaina Boothe  To: Enrique Perez MD  Sent: 7/25/2023 9:51 AM CDT  Subject: Mammogram     Good morning I received a message about being overdue for my annual mammogram. I was going to schedule but they did not have a order. Would you be able to put the order in for me?

## 2023-07-25 NOTE — TELEPHONE ENCOUNTER
Last bilateral screening mammo was 6/21/22. Pt'ls last visit with Dr. Kaleigh Mauricio was over 1 year ago on 6/29/22, therefore I am not permitted to provide and sign a mammogram order per protocol. Dr. Kaleigh Mauricio, please advise if you want to order (pended for you).  I did remind pt in my response she is due for a physical.

## 2023-07-26 RX ORDER — INSULIN GLARGINE 100 [IU]/ML
50 INJECTION, SOLUTION SUBCUTANEOUS NIGHTLY
Qty: 45 ML | Refills: 0 | Status: SHIPPED | OUTPATIENT
Start: 2023-07-26

## 2023-08-18 ENCOUNTER — HOSPITAL ENCOUNTER (OUTPATIENT)
Dept: MAMMOGRAPHY | Age: 48
Discharge: HOME | End: 2023-08-18
Attending: INTERNAL MEDICINE
Payer: COMMERCIAL

## 2023-08-18 DIAGNOSIS — Z12.31 BREAST CANCER SCREENING BY MAMMOGRAM: ICD-10-CM

## 2023-08-18 PROCEDURE — 77067 SCR MAMMO BI INCL CAD: CPT | Performed by: INTERNAL MEDICINE

## 2023-08-18 PROCEDURE — 77063 BREAST TOMOSYNTHESIS BI: CPT | Performed by: INTERNAL MEDICINE

## 2023-08-30 RX ORDER — LOSARTAN POTASSIUM 50 MG/1
50 TABLET ORAL DAILY
Qty: 90 TABLET | Refills: 0 | Status: SHIPPED | OUTPATIENT
Start: 2023-08-30

## 2023-09-07 ENCOUNTER — OFFICE VISIT (OUTPATIENT)
Dept: INTERNAL MEDICINE CLINIC | Facility: CLINIC | Age: 48
End: 2023-09-07

## 2023-09-07 ENCOUNTER — LAB ENCOUNTER (OUTPATIENT)
Dept: LAB | Age: 48
End: 2023-09-07
Attending: INTERNAL MEDICINE
Payer: COMMERCIAL

## 2023-09-07 VITALS
HEIGHT: 64 IN | BODY MASS INDEX: 38.41 KG/M2 | OXYGEN SATURATION: 100 % | WEIGHT: 225 LBS | TEMPERATURE: 98 F | RESPIRATION RATE: 18 BRPM | HEART RATE: 86 BPM | SYSTOLIC BLOOD PRESSURE: 130 MMHG | DIASTOLIC BLOOD PRESSURE: 85 MMHG

## 2023-09-07 DIAGNOSIS — M25.551 BILATERAL HIP PAIN: ICD-10-CM

## 2023-09-07 DIAGNOSIS — K21.9 GASTROESOPHAGEAL REFLUX DISEASE, UNSPECIFIED WHETHER ESOPHAGITIS PRESENT: ICD-10-CM

## 2023-09-07 DIAGNOSIS — Z79.4 TYPE 2 DIABETES MELLITUS WITH HYPERGLYCEMIA, WITH LONG-TERM CURRENT USE OF INSULIN (HCC): ICD-10-CM

## 2023-09-07 DIAGNOSIS — Z00.00 PHYSICAL EXAM, ANNUAL: ICD-10-CM

## 2023-09-07 DIAGNOSIS — E11.65 TYPE 2 DIABETES MELLITUS WITH HYPERGLYCEMIA, WITH LONG-TERM CURRENT USE OF INSULIN (HCC): ICD-10-CM

## 2023-09-07 DIAGNOSIS — M25.552 BILATERAL HIP PAIN: ICD-10-CM

## 2023-09-07 DIAGNOSIS — Z00.00 PHYSICAL EXAM, ANNUAL: Primary | ICD-10-CM

## 2023-09-07 LAB
ALBUMIN SERPL-MCNC: 3.8 G/DL (ref 3.4–5)
ALBUMIN/GLOB SERPL: 1 {RATIO} (ref 1–2)
ALP LIVER SERPL-CCNC: 73 U/L
ALT SERPL-CCNC: 22 U/L
ANION GAP SERPL CALC-SCNC: 6 MMOL/L (ref 0–18)
AST SERPL-CCNC: 12 U/L (ref 15–37)
BILIRUB SERPL-MCNC: 0.5 MG/DL (ref 0.1–2)
BUN BLD-MCNC: 11 MG/DL (ref 7–18)
BUN/CREAT SERPL: 13.6 (ref 10–20)
CALCIUM BLD-MCNC: 8.9 MG/DL (ref 8.5–10.1)
CHLORIDE SERPL-SCNC: 107 MMOL/L (ref 98–112)
CHOLEST SERPL-MCNC: 119 MG/DL (ref ?–200)
CO2 SERPL-SCNC: 26 MMOL/L (ref 21–32)
CREAT BLD-MCNC: 0.81 MG/DL
CREAT UR-SCNC: 291 MG/DL
DEPRECATED RDW RBC AUTO: 41.1 FL (ref 35.1–46.3)
EGFRCR SERPLBLD CKD-EPI 2021: 91 ML/MIN/1.73M2 (ref 60–?)
ERYTHROCYTE [DISTWIDTH] IN BLOOD BY AUTOMATED COUNT: 14 % (ref 11–15)
FASTING PATIENT LIPID ANSWER: YES
FASTING STATUS PATIENT QL REPORTED: YES
GLOBULIN PLAS-MCNC: 3.7 G/DL (ref 2.8–4.4)
GLUCOSE BLD-MCNC: 147 MG/DL (ref 70–99)
HCT VFR BLD AUTO: 42.4 %
HDLC SERPL-MCNC: 48 MG/DL (ref 40–59)
HGB BLD-MCNC: 14 G/DL
LDLC SERPL CALC-MCNC: 58 MG/DL (ref ?–100)
MCH RBC QN AUTO: 26.8 PG (ref 26–34)
MCHC RBC AUTO-ENTMCNC: 33 G/DL (ref 31–37)
MCV RBC AUTO: 81.2 FL
MICROALBUMIN UR-MCNC: 4.03 MG/DL
MICROALBUMIN/CREAT 24H UR-RTO: 13.8 UG/MG (ref ?–30)
NONHDLC SERPL-MCNC: 71 MG/DL (ref ?–130)
OSMOLALITY SERPL CALC.SUM OF ELEC: 290 MOSM/KG (ref 275–295)
PLATELET # BLD AUTO: 362 10(3)UL (ref 150–450)
POTASSIUM SERPL-SCNC: 3.6 MMOL/L (ref 3.5–5.1)
PROT SERPL-MCNC: 7.5 G/DL (ref 6.4–8.2)
RBC # BLD AUTO: 5.22 X10(6)UL
SODIUM SERPL-SCNC: 139 MMOL/L (ref 136–145)
TRIGL SERPL-MCNC: 57 MG/DL (ref 30–149)
TSI SER-ACNC: 0.7 MIU/ML (ref 0.36–3.74)
VIT D+METAB SERPL-MCNC: 22.4 NG/ML (ref 30–100)
VLDLC SERPL CALC-MCNC: 8 MG/DL (ref 0–30)
WBC # BLD AUTO: 6.7 X10(3) UL (ref 4–11)

## 2023-09-07 PROCEDURE — 80061 LIPID PANEL: CPT

## 2023-09-07 PROCEDURE — 3075F SYST BP GE 130 - 139MM HG: CPT | Performed by: INTERNAL MEDICINE

## 2023-09-07 PROCEDURE — 90471 IMMUNIZATION ADMIN: CPT | Performed by: INTERNAL MEDICINE

## 2023-09-07 PROCEDURE — 3008F BODY MASS INDEX DOCD: CPT | Performed by: INTERNAL MEDICINE

## 2023-09-07 PROCEDURE — 80053 COMPREHEN METABOLIC PANEL: CPT

## 2023-09-07 PROCEDURE — 3079F DIAST BP 80-89 MM HG: CPT | Performed by: INTERNAL MEDICINE

## 2023-09-07 PROCEDURE — 90677 PCV20 VACCINE IM: CPT | Performed by: INTERNAL MEDICINE

## 2023-09-07 PROCEDURE — 82306 VITAMIN D 25 HYDROXY: CPT

## 2023-09-07 PROCEDURE — 82570 ASSAY OF URINE CREATININE: CPT

## 2023-09-07 PROCEDURE — 3061F NEG MICROALBUMINURIA REV: CPT | Performed by: INTERNAL MEDICINE

## 2023-09-07 PROCEDURE — 85027 COMPLETE CBC AUTOMATED: CPT

## 2023-09-07 PROCEDURE — 36415 COLL VENOUS BLD VENIPUNCTURE: CPT

## 2023-09-07 PROCEDURE — 99396 PREV VISIT EST AGE 40-64: CPT | Performed by: INTERNAL MEDICINE

## 2023-09-07 PROCEDURE — 82043 UR ALBUMIN QUANTITATIVE: CPT

## 2023-09-07 PROCEDURE — 84443 ASSAY THYROID STIM HORMONE: CPT

## 2023-09-07 RX ORDER — PANTOPRAZOLE SODIUM 20 MG/1
20 TABLET, DELAYED RELEASE ORAL
Qty: 90 TABLET | Refills: 2 | Status: SHIPPED | OUTPATIENT
Start: 2023-09-07

## 2023-09-08 ENCOUNTER — MED REC SCAN ONLY (OUTPATIENT)
Dept: INTERNAL MEDICINE CLINIC | Facility: CLINIC | Age: 48
End: 2023-09-08

## 2023-09-09 ENCOUNTER — HOSPITAL ENCOUNTER (OUTPATIENT)
Dept: GENERAL RADIOLOGY | Age: 48
Discharge: HOME | End: 2023-09-09
Attending: INTERNAL MEDICINE
Payer: COMMERCIAL

## 2023-09-09 DIAGNOSIS — M25.552 BILATERAL HIP PAIN: ICD-10-CM

## 2023-09-09 DIAGNOSIS — M25.551 BILATERAL HIP PAIN: ICD-10-CM

## 2023-09-09 PROCEDURE — 73523 X-RAY EXAM HIPS BI 5/> VIEWS: CPT | Performed by: INTERNAL MEDICINE

## 2023-09-11 DIAGNOSIS — E55.9 VITAMIN D DEFICIENCY: Primary | ICD-10-CM

## 2023-09-11 RX ORDER — ERGOCALCIFEROL 1.25 MG/1
50000 CAPSULE ORAL WEEKLY
Qty: 4 CAPSULE | Refills: 3 | Status: SHIPPED | OUTPATIENT
Start: 2023-09-11

## 2023-09-13 ENCOUNTER — TELEPHONE (OUTPATIENT)
Dept: INTERNAL MEDICINE CLINIC | Facility: CLINIC | Age: 48
End: 2023-09-13

## 2023-09-25 ENCOUNTER — OFFICE VISIT (OUTPATIENT)
Dept: ENDOCRINOLOGY CLINIC | Facility: CLINIC | Age: 48
End: 2023-09-25

## 2023-09-25 VITALS
DIASTOLIC BLOOD PRESSURE: 83 MMHG | HEART RATE: 93 BPM | BODY MASS INDEX: 39 KG/M2 | WEIGHT: 225 LBS | SYSTOLIC BLOOD PRESSURE: 120 MMHG

## 2023-09-25 DIAGNOSIS — E11.9 DIABETES MELLITUS TYPE 2 WITHOUT RETINOPATHY (HCC): Primary | ICD-10-CM

## 2023-09-25 LAB
CARTRIDGE LOT#: ABNORMAL NUMERIC
GLUCOSE BLOOD: 103
HEMOGLOBIN A1C: 6.9 % (ref 4.3–5.6)
TEST STRIP LOT #: NORMAL NUMERIC

## 2023-09-25 PROCEDURE — 99214 OFFICE O/P EST MOD 30 MIN: CPT | Performed by: INTERNAL MEDICINE

## 2023-09-25 PROCEDURE — 3074F SYST BP LT 130 MM HG: CPT | Performed by: INTERNAL MEDICINE

## 2023-09-25 PROCEDURE — 82947 ASSAY GLUCOSE BLOOD QUANT: CPT | Performed by: INTERNAL MEDICINE

## 2023-09-25 PROCEDURE — 83036 HEMOGLOBIN GLYCOSYLATED A1C: CPT | Performed by: INTERNAL MEDICINE

## 2023-09-25 PROCEDURE — 3044F HG A1C LEVEL LT 7.0%: CPT | Performed by: INTERNAL MEDICINE

## 2023-09-25 PROCEDURE — 3079F DIAST BP 80-89 MM HG: CPT | Performed by: INTERNAL MEDICINE

## 2023-09-25 RX ORDER — FLUCONAZOLE 150 MG/1
150 TABLET ORAL WEEKLY
Qty: 12 TABLET | Refills: 1 | Status: SHIPPED | OUTPATIENT
Start: 2023-09-25

## 2023-09-25 RX ORDER — SEMAGLUTIDE 2.68 MG/ML
2 INJECTION, SOLUTION SUBCUTANEOUS WEEKLY
Qty: 9 ML | Refills: 1 | Status: SHIPPED | OUTPATIENT
Start: 2023-09-25

## 2023-09-25 RX ORDER — EMPAGLIFLOZIN 25 MG/1
1 TABLET, FILM COATED ORAL DAILY
Qty: 90 TABLET | Refills: 1 | Status: SHIPPED | OUTPATIENT
Start: 2023-09-25

## 2023-09-27 RX ORDER — EMPAGLIFLOZIN 25 MG/1
1 TABLET, FILM COATED ORAL DAILY
Qty: 90 TABLET | Refills: 1 | Status: SHIPPED | OUTPATIENT
Start: 2023-09-27

## 2023-09-27 NOTE — TELEPHONE ENCOUNTER
Empagliflozin (JARDIANCE) 25 MG Oral Tab, Take 1 tablet by mouth daily. , Disp: 90 tablet, Rfl: 1    KEY: [id number]

## 2023-10-26 RX ORDER — ROSUVASTATIN CALCIUM 20 MG/1
20 TABLET, COATED ORAL NIGHTLY
Qty: 90 TABLET | Refills: 0 | Status: SHIPPED | OUTPATIENT
Start: 2023-10-26

## 2023-10-26 RX ORDER — INSULIN GLARGINE 100 [IU]/ML
50 INJECTION, SOLUTION SUBCUTANEOUS NIGHTLY
Qty: 45 ML | Refills: 0 | Status: SHIPPED | OUTPATIENT
Start: 2023-10-26

## 2023-10-26 RX ORDER — GLIMEPIRIDE 4 MG/1
4 TABLET ORAL
Qty: 90 TABLET | Refills: 0 | Status: SHIPPED | OUTPATIENT
Start: 2023-10-26 | End: 2023-10-27 | Stop reason: ALTCHOICE

## 2023-10-27 ENCOUNTER — OFFICE VISIT (OUTPATIENT)
Dept: FAMILY MEDICINE CLINIC | Facility: CLINIC | Age: 48
End: 2023-10-27

## 2023-10-27 VITALS
WEIGHT: 221 LBS | HEART RATE: 85 BPM | DIASTOLIC BLOOD PRESSURE: 81 MMHG | HEIGHT: 64 IN | OXYGEN SATURATION: 100 % | SYSTOLIC BLOOD PRESSURE: 115 MMHG | BODY MASS INDEX: 37.73 KG/M2

## 2023-10-27 DIAGNOSIS — R53.1 WEAKNESS: Primary | ICD-10-CM

## 2023-10-27 DIAGNOSIS — J30.89 ALLERGIC RHINITIS DUE TO OTHER ALLERGIC TRIGGER, UNSPECIFIED SEASONALITY: ICD-10-CM

## 2023-10-27 DIAGNOSIS — E87.6 HYPOKALEMIA: ICD-10-CM

## 2023-10-27 RX ORDER — MOMETASONE FUROATE 50 UG/1
1 SPRAY, METERED NASAL DAILY
Qty: 17 G | Refills: 2 | Status: SHIPPED | OUTPATIENT
Start: 2023-10-27

## 2023-10-27 NOTE — PATIENT INSTRUCTIONS
Foods High in protein   Highest content (>25 mEq/100 g)  Dried figs  Molasses  Seaweed    Very high content (>12.5 mEq/100 g)  Dried fruits (dates, prunes)  Nuts  Avocados  Bran cereals  Wheat germ  Lima beans     High content (>6.2 mEq/100 g)  Vegetables  Spinach  Tomatoes  Broccoli  Winter squash  Beets  Carrots  Cauliflower  Potatoes  Fruits  Bananas  Cantaloupe  Kiwis  Oranges  Mangos  Meats  Ground beef  Steak  Pork  Veal

## 2023-11-01 ENCOUNTER — LAB ENCOUNTER (OUTPATIENT)
Dept: LAB | Facility: HOSPITAL | Age: 48
End: 2023-11-01
Attending: FAMILY MEDICINE
Payer: COMMERCIAL

## 2023-11-01 DIAGNOSIS — E87.6 HYPOKALEMIA: ICD-10-CM

## 2023-11-01 LAB
ANION GAP SERPL CALC-SCNC: 9 MMOL/L (ref 0–18)
BUN BLD-MCNC: 11 MG/DL (ref 9–23)
BUN/CREAT SERPL: 13.9 (ref 10–20)
CALCIUM BLD-MCNC: 9.3 MG/DL (ref 8.7–10.4)
CHLORIDE SERPL-SCNC: 108 MMOL/L (ref 98–112)
CO2 SERPL-SCNC: 24 MMOL/L (ref 21–32)
CREAT BLD-MCNC: 0.79 MG/DL
EGFRCR SERPLBLD CKD-EPI 2021: 93 ML/MIN/1.73M2 (ref 60–?)
FASTING STATUS PATIENT QL REPORTED: NO
GLUCOSE BLD-MCNC: 114 MG/DL (ref 70–99)
OSMOLALITY SERPL CALC.SUM OF ELEC: 292 MOSM/KG (ref 275–295)
POTASSIUM SERPL-SCNC: 3.3 MMOL/L (ref 3.5–5.1)
SODIUM SERPL-SCNC: 141 MMOL/L (ref 136–145)

## 2023-11-01 PROCEDURE — 36415 COLL VENOUS BLD VENIPUNCTURE: CPT

## 2023-11-01 PROCEDURE — 83735 ASSAY OF MAGNESIUM: CPT

## 2023-11-01 PROCEDURE — 80048 BASIC METABOLIC PNL TOTAL CA: CPT

## 2023-11-03 DIAGNOSIS — E87.6 HYPOKALEMIA: Primary | ICD-10-CM

## 2023-11-03 LAB — MAGNESIUM SERPL-MCNC: 2 MG/DL (ref 1.6–2.6)

## 2023-11-15 RX ORDER — LOSARTAN POTASSIUM 50 MG/1
50 TABLET ORAL DAILY
Qty: 90 TABLET | Refills: 0 | Status: SHIPPED | OUTPATIENT
Start: 2023-11-15

## 2023-12-22 DIAGNOSIS — E11.9 DIABETES MELLITUS TYPE 2 WITHOUT RETINOPATHY (HCC): ICD-10-CM

## 2023-12-25 ENCOUNTER — HOSPITAL ENCOUNTER (OUTPATIENT)
Age: 48
Discharge: HOME | End: 2023-12-25
Payer: COMMERCIAL

## 2023-12-25 VITALS
HEART RATE: 91 BPM | OXYGEN SATURATION: 100 % | TEMPERATURE: 98 F | DIASTOLIC BLOOD PRESSURE: 73 MMHG | SYSTOLIC BLOOD PRESSURE: 145 MMHG | RESPIRATION RATE: 18 BRPM

## 2023-12-25 DIAGNOSIS — J06.9 UPPER RESPIRATORY TRACT INFECTION, UNSPECIFIED TYPE: Primary | ICD-10-CM

## 2023-12-25 PROCEDURE — 99213 OFFICE O/P EST LOW 20 MIN: CPT | Performed by: NURSE PRACTITIONER

## 2023-12-25 RX ORDER — TRIAMCINOLONE ACETONIDE 55 UG/1
1 SPRAY, METERED NASAL 2 TIMES DAILY
Qty: 10.8 ML | Refills: 0 | Status: SHIPPED | OUTPATIENT
Start: 2023-12-25

## 2023-12-25 RX ORDER — BENZONATATE 200 MG/1
200 CAPSULE ORAL 3 TIMES DAILY PRN
Qty: 15 CAPSULE | Refills: 0 | Status: SHIPPED | OUTPATIENT
Start: 2023-12-25

## 2023-12-25 NOTE — ED INITIAL ASSESSMENT (HPI)
Pt living in [place] for past 2 wks and has had reaction to climate with seasonal allergies causing ongoing sinus pressure with chest congestion that pt states she can't cough up; denies fever, CP, SOB or dizziness

## 2023-12-26 ENCOUNTER — TELEPHONE (OUTPATIENT)
Dept: ENDOCRINOLOGY CLINIC | Facility: CLINIC | Age: 48
End: 2023-12-26

## 2023-12-26 RX ORDER — PEN NEEDLE, DIABETIC 31 G X1/4"
NEEDLE, DISPOSABLE MISCELLANEOUS
Qty: 400 EACH | Refills: 1 | Status: SHIPPED | OUTPATIENT
Start: 2023-12-26

## 2023-12-26 RX ORDER — EMPAGLIFLOZIN 25 MG/1
1 TABLET, FILM COATED ORAL DAILY
Qty: 90 TABLET | Refills: 1 | Status: SHIPPED | OUTPATIENT
Start: 2023-12-26

## 2023-12-28 NOTE — TELEPHONE ENCOUNTER
Medication PA Requested: Jardiance 25 mg                                                        CoverMyMeds Used:  Key:  Quantity: 90  Day Supply:   Sig: take 1 tablet by mouth daily  DX Code:  E11.9                               CPT code (if applicable):   Case Number/Pending Ref#:

## 2023-12-28 NOTE — TELEPHONE ENCOUNTER
Medication PA Requested: Jardiance 25 mg                                                        CoverMyMeds Used: Yes  Key: [id number]  Quantity: 90  Day Supply:   Sig: take 1 tablet by mouth daily  DX Code:  E11.9                               CPT code (if applicable):   Case Number/Pending Ref#:      CMM submitted, LOV 9/25 and A1C 9/25  Awaiting determination

## 2024-01-06 NOTE — TELEPHONE ENCOUNTER
Received approval letter from [facility] regarding Jardinace 25mg. This is approved, and is effective 11/28/23 - 12/27/24. Sent to scanning.

## 2024-01-23 DIAGNOSIS — E55.9 VITAMIN D DEFICIENCY: ICD-10-CM

## 2024-01-24 NOTE — TELEPHONE ENCOUNTER
Protocol Failed/ No Protocol    Requested Prescriptions   Pending Prescriptions Disp Refills    ERGOCALCIFEROL 1.25 MG (46963 UT) Oral Cap [Pharmacy Med Name: Vitamin D 50,000 Unit Cap Bion] 4 capsule 0     Sig: Take 1 capsule (50,000 Units total) by mouth once a week.       There is no refill protocol information for this order          Future Appointments         Provider Department Appt Notes    In 1 week Giuliana Alvares MD [facility] 4 months          Recent Outpatient Visits              2 months ago Weakness    [facility] Vickie Dsouza DO    Office Visit    4 months ago Diabetes mellitus type 2 without retinopathy (HCC)    [facility] Giuliana Alvares MD    Office Visit    4 months ago Physical exam, annual    [facility] Angélica Hernadez MD    Office Visit    8 months ago Diabetes mellitus type 2 without retinopathy (HCC)    [facility] Giuliana Alvares MD    Office Visit    10 months ago Diabetes mellitus type 2 without retinopathy (HCC)    [facility] Angela Gerard APRN    Office Visit

## 2024-01-25 RX ORDER — ERGOCALCIFEROL 1.25 MG/1
50000 CAPSULE ORAL WEEKLY
Qty: 12 CAPSULE | Refills: 3 | OUTPATIENT
Start: 2024-01-25

## 2024-02-02 ENCOUNTER — OFFICE VISIT (OUTPATIENT)
Dept: ENDOCRINOLOGY CLINIC | Facility: CLINIC | Age: 49
End: 2024-02-02

## 2024-02-02 VITALS
SYSTOLIC BLOOD PRESSURE: 117 MMHG | BODY MASS INDEX: 37.9 KG/M2 | HEIGHT: 64.02 IN | HEART RATE: 80 BPM | DIASTOLIC BLOOD PRESSURE: 75 MMHG | WEIGHT: 222 LBS

## 2024-02-02 DIAGNOSIS — E11.65 UNCONTROLLED TYPE 2 DIABETES MELLITUS WITH HYPERGLYCEMIA (HCC): Primary | ICD-10-CM

## 2024-02-02 LAB
CARTRIDGE LOT#: ABNORMAL NUMERIC
GLUCOSE BLOOD: 127
HEMOGLOBIN A1C: 7 % (ref 4.3–5.6)
TEST STRIP EXPIRATION DATE: NORMAL DATE
TEST STRIP LOT #: NORMAL NUMERIC

## 2024-02-02 PROCEDURE — 3051F HG A1C>EQUAL 7.0%<8.0%: CPT | Performed by: INTERNAL MEDICINE

## 2024-02-02 PROCEDURE — 82947 ASSAY GLUCOSE BLOOD QUANT: CPT | Performed by: INTERNAL MEDICINE

## 2024-02-02 PROCEDURE — 99214 OFFICE O/P EST MOD 30 MIN: CPT | Performed by: INTERNAL MEDICINE

## 2024-02-02 PROCEDURE — 83036 HEMOGLOBIN GLYCOSYLATED A1C: CPT | Performed by: INTERNAL MEDICINE

## 2024-02-02 PROCEDURE — 3078F DIAST BP <80 MM HG: CPT | Performed by: INTERNAL MEDICINE

## 2024-02-02 PROCEDURE — 3074F SYST BP LT 130 MM HG: CPT | Performed by: INTERNAL MEDICINE

## 2024-02-02 PROCEDURE — 3008F BODY MASS INDEX DOCD: CPT | Performed by: INTERNAL MEDICINE

## 2024-02-02 RX ORDER — TIRZEPATIDE 7.5 MG/.5ML
7.5 INJECTION, SOLUTION SUBCUTANEOUS WEEKLY
Qty: 6 ML | Refills: 1 | Status: SHIPPED | OUTPATIENT
Start: 2024-02-02

## 2024-02-02 RX ORDER — LOSARTAN POTASSIUM 25 MG/1
25 TABLET ORAL DAILY
Qty: 90 TABLET | Refills: 1 | Status: SHIPPED | OUTPATIENT
Start: 2024-02-02

## 2024-02-02 RX ORDER — TIRZEPATIDE 7.5 MG/.5ML
7.5 INJECTION, SOLUTION SUBCUTANEOUS WEEKLY
Qty: 6 ML | Refills: 1 | Status: SHIPPED | OUTPATIENT
Start: 2024-02-02 | End: 2024-02-02

## 2024-02-02 RX ORDER — INSULIN GLARGINE 100 [IU]/ML
50 INJECTION, SOLUTION SUBCUTANEOUS NIGHTLY
Qty: 45 ML | Refills: 1 | Status: SHIPPED | OUTPATIENT
Start: 2024-02-02

## 2024-02-02 NOTE — PROGRESS NOTES
Name: Lesley Tracy  Date: 2024      HISTORY OF PRESENT ILLNESS   Lesley Tracy is a 48 year old female who presents for diabetes mellitus, diagnosed in .         Prior HbA, C or glycohemoglobin were 9.6% 2014; 5.9% 10/2014; 8.1% 2015; 8.8% 2015; 11.1% 2015; 8.4% 2016; 6.8% 2016; 8.7% 2016; 11.0% 3/2018; 10.2% 2018; 10.3% 2018; 7.8% 2018; 10.4% 10/2019; 9.3% 2020; 7.5% 2020; 8.4% 2020; 7.3% 3/2021; 8.3% 2022; 8.9% 2022; 7.2% 2023; 6.2% 3/17/2023; 8.2% 2023; 6.9% 2023; 7.0% POC Today     Dietary compliance: Fair, tries to follow a low carb diet     Exercise: No  Polyuria/polydipsia: No  Blurred vision: No    Episodes of hypoglycemia: no  Blood Glucose:  Checking 1-2 times per day  Fastin-150    Medications for DM   Basaglar 50 units SQ daily  Jardiance 25mg PO daily   Ozempic 1mg subcutaneous weekly     Mounjaro d/c'd due to abdominal pain   Victoza was still too expensive   Tresiba stopped due to back pain   toujeo -lack of glycemic control     -->Intolerant of Metformin     REVIEW OF SYSTEMS  Eyes: Diabetic retinopathy present: No            Most recent visit to eye doctor in last 12 months: Yes    CV: Cardiovascular disease present: No         Hypertension present: Yes         Hyperlipidemia present: Yes         Peripheral Vascular Disease present: No    : Nephropathy present: No    Neuro: Neuropathy present: No    Skin: Infection or ulceration: No    Osteoporosis: No    Thyroid disease: Yes, she has history of multinodular goiter s/p US guided FNA several years ago.  Most recent US demonstrated stable size of thyroid nodules.        Medications:     Current Outpatient Medications:     Empagliflozin (JARDIANCE) 25 MG Oral Tab, Take 1 tablet by mouth daily., Disp: 90 tablet, Rfl: 1    insulin glargine (BASAGLAR KWIKPEN) 100 UNIT/ML Subcutaneous Solution Pen-injector, Inject 50 Units into the skin nightly., Disp: 45 mL, Rfl: 0    semaglutide (OZEMPIC, 2  MG/DOSE,) 8 MG/3ML Subcutaneous Solution Pen-injector, Inject 2 mg into the skin once a week., Disp: 9 mL, Rfl: 1    Insulin Pen Needle (PEN NEEDLES) 31G X 6 MM Does not apply Misc, Inject 4 times a day as directed, Disp: 400 each, Rfl: 1    benzonatate 200 MG Oral Cap, Take 1 capsule (200 mg total) by mouth 3 (three) times daily as needed for cough., Disp: 15 capsule, Rfl: 0    triamcinolone 55 MCG/ACT Nasal Aerosol, 1 spray by Nasal route 2 (two) times daily., Disp: 10.8 mL, Rfl: 0    losartan 50 MG Oral Tab, Take 1 tablet (50 mg total) by mouth daily., Disp: 90 tablet, Rfl: 0    rosuvastatin 20 MG Oral Tab, Take 1 tablet (20 mg total) by mouth nightly., Disp: 90 tablet, Rfl: 0    fluconazole 150 MG Oral Tab, Take 1 tablet (150 mg total) by mouth once a week., Disp: 12 tablet, Rfl: 1    ergocalciferol 1.25 MG (42113 UT) Oral Cap, Take 1 capsule (50,000 Units total) by mouth once a week., Disp: 4 capsule, Rfl: 3    pantoprazole 20 MG Oral Tab EC, Take 1 tablet (20 mg total) by mouth before breakfast., Disp: 90 tablet, Rfl: 2    Microlet Lancets Does not apply Misc, Check 2 times per day, Disp: 200 each, Rfl: 1    Insulin Pen Needle (BD PEN NEEDLE ESTEFANY U/F) 32G X 4 MM Does not apply Misc, Inject 2 times daily, Disp: 100 each, Rfl: 3    Insulin Pen Needle (RELION PEN NEEDLES) 32G X 4 MM Does not apply Misc, Use with injections 2 times daily, Disp: 200 each, Rfl: 3    Lancets 30G Does not apply Misc, Check 2 times daily, lancets for contour, Disp: 100 each, Rfl: 6    Insulin Syringe 30G X 5/16\" 1 ML Does not apply Misc, Use with insulin 4 times per day, Disp: 150 each, Rfl: 0     Allergies:   Allergies   Allergen Reactions    Metformin NAUSEA AND VOMITING and DIARRHEA    Morphine ITCHING, RESTLESSNESS and Tightness in Chest    Insulin Degludec OTHER (SEE COMMENTS)     Back pain       Social History:   Social History     Socioeconomic History    Marital status:    Tobacco Use    Smoking status: Never     Smokeless tobacco: Never   Vaping Use    Vaping Use: Never used   Substance and Sexual Activity    Alcohol use: No    Drug use: No   Other Topics Concern    Caffeine Concern Yes     Comment: tea; 2 cups a week.    Left Handed Yes    Right Handed No    Currently spends a great deal of time in the sun No    History of tanning No    Hx of Spending Great Deal of Time in Sun No    Bad sunburns in the past No    Tanning Salons in the Past No    Hx of Radiation Treatments No       Medical History:   Past Medical History:   Diagnosis Date    Diabetes mellitus (HCC) no BDR  07/24/2014    Diabetic retinopathy (HCC) 2008    High blood pressure     Myopia of both eyes with astigmatism     OU    Obesity (BMI 30-39.9)     Other and unspecified hyperlipidemia     Sleep apnea     Stress     Type II or unspecified type diabetes mellitus without mention of complication, not stated as uncontrolled        Surgical history:   Past Surgical History:   Procedure Laterality Date    COLONOSCOPY N/A 11/3/2022    Procedure: COLONOSCOPY;  Surgeon: Loretta Jovel MD;  Location: [facility] ENDOSCOPY    UPPER GI ENDOSCOPY,EXAM           PHYSICAL EXAM  /75   Pulse 80   Ht 5' 4.02\" (1.626 m)   Wt 222 lb (100.7 kg)   LMP 10/01/2023 (Approximate)   BMI 38.09 kg/m²     General Appearance:  alert, well developed, in no acute distress  Eyes:  normal conjunctivae, sclera., normal sclera and normal pupils  Ears/Nose/Mouth/Throat/Neck:  no palpable thyroid nodules or cervical lymphadenopathy, diffusely enlarged thyroid  Back: no kyphosis or back tenderness  Musculoskeletal:  normal muscle strength and tone  Skin:  normal moisture and skin texture  Neuro:  sensory grossly intact and motor grossly intact  Psychiatric:  oriented to time, self, and place  Nutritional:  no abnormal weight gain or loss    ASSESSMENT/PLAN:      1. Diabetes Mellitus Type 2, controlled  -controlled, HgA1c 7.0% -->stable   -Discussed importance of glycemic control to prevent  complications of diabetes  -Discussed complications of diabetes include retinopathy, neuropathy, nephropathy and cardiovascular disease  -Discussed importance of SBGM  -Discussed importance of low CHO diet  -continue with Lantus 50 units subcutaneous daily   -Discontinue Ozempic  -Start Mounjaro 7.5mg subcutaneous weekly, verbalized understanding of risks and benefits   -Continue Jardiance 25mg PO daily  -No Metformin therapy given significant GI symptoms  -Normotensive  -UTD with optho  -LDL elevated, Changed to Rosuvastatin 5mg PO daily, verbalized understanding of risks and benefits   -Normal lipids     RTC 4 months    Giuliana Alvares  2/2/2024

## 2024-02-09 ENCOUNTER — TELEPHONE (OUTPATIENT)
Dept: ENDOCRINOLOGY CLINIC | Facility: CLINIC | Age: 49
End: 2024-02-09

## 2024-02-09 NOTE — TELEPHONE ENCOUNTER
Pharmacy requesting pa for     Tirzepatide (MOUNJARO) 7.5 MG/0.5ML Subcutaneous Solution Pen-injector, Inject 7.5 mg into the skin once a week., Disp: 6 mL, Rfl: 1    KEY: [id number]

## 2024-02-13 NOTE — TELEPHONE ENCOUNTER
Medication PA Requested:  MOUNJARO) 7.5 MG/0.5ML                                                         CoverMyMeds Used: Yes  Key: [id number]   Quantity: 6 mL   Day Supply: 90  Sig:  Inject 7.5 mg into the skin once a week.   DX Code:      E11.65

## 2024-02-14 NOTE — TELEPHONE ENCOUNTER
Medication PA Requested:  MOUNJARO) 7.5 MG/0.5ML                                                         CoverMyMeds Used: No  Key:   Quantity: 6 mL   Day Supply: 90  Sig:  Inject 7.5 mg into the skin once a week.   DX Code:      E11.65               EPA submitted, LOV 2/2 and A1C 2/2  Awaiting determination

## 2024-02-17 ENCOUNTER — PATIENT MESSAGE (OUTPATIENT)
Dept: ENDOCRINOLOGY CLINIC | Facility: CLINIC | Age: 49
End: 2024-02-17

## 2024-02-21 NOTE — TELEPHONE ENCOUNTER
ePA resubmitted with LOV and A1c 2/2  Awaiting determination     Medication PA Requested:  MOUNJARO) 7.5 MG/0.5ML                                                         CoverMyMeds Used: No  Key:   Quantity: 6 mL   Day Supply: 90  Sig:  Inject 7.5 mg into the skin once a week.   DX Code:      E11.65

## 2024-02-22 NOTE — TELEPHONE ENCOUNTER
Received fax from [employer] stating the MOUJARO 7.5MG/0.5ML soultion pen -inj has been approved up to 2 mL per 28 days, effective 01/23/24 ends 02/22/25. [employer] sent    ID#[id number]  CASE#[id number]

## 2024-04-19 ENCOUNTER — PATIENT MESSAGE (OUTPATIENT)
Dept: ENDOCRINOLOGY CLINIC | Facility: CLINIC | Age: 49
End: 2024-04-19

## 2024-04-20 NOTE — TELEPHONE ENCOUNTER
From: Lesley Tracy  To: Giuliana Alvares  Sent: 4/19/2024 5:31 PM CDT  Subject: Medication Change    Good evening I hope you are well. I have been having the hardest time getting my Monjauro. The Pharmacist told me to ask you to increase my dose to 10 because the 7.5 has been on back order since March.

## 2024-04-22 RX ORDER — TIRZEPATIDE 10 MG/.5ML
10 INJECTION, SOLUTION SUBCUTANEOUS WEEKLY
Qty: 2 ML | Refills: 0 | Status: SHIPPED | OUTPATIENT
Start: 2024-04-22

## 2024-05-18 ENCOUNTER — PATIENT MESSAGE (OUTPATIENT)
Dept: ENDOCRINOLOGY CLINIC | Facility: CLINIC | Age: 49
End: 2024-05-18

## 2024-05-20 RX ORDER — SEMAGLUTIDE 1.34 MG/ML
1 INJECTION, SOLUTION SUBCUTANEOUS WEEKLY
Qty: 3 ML | Refills: 1 | Status: SHIPPED | OUTPATIENT
Start: 2024-05-20

## 2024-05-20 NOTE — TELEPHONE ENCOUNTER
From: Lesley Tracy  To: Giuliana Alvares  Sent: 5/18/2024 1:26 PM CDT  Subject: Medication     Good Afternoon I hope you are well. I am having a hard time getting the Moujaro again. I talked to multiple pharmacy's and they said to ask for a switch to Wegovy they have all the strengths they are not ordering the Mounjaro 10 or. 7.5 any more. Because of the long back order.

## 2024-06-14 ENCOUNTER — OFFICE VISIT (OUTPATIENT)
Dept: ENDOCRINOLOGY CLINIC | Facility: CLINIC | Age: 49
End: 2024-06-14

## 2024-06-14 VITALS
DIASTOLIC BLOOD PRESSURE: 73 MMHG | WEIGHT: 225 LBS | BODY MASS INDEX: 38.41 KG/M2 | HEART RATE: 91 BPM | SYSTOLIC BLOOD PRESSURE: 122 MMHG | HEIGHT: 64.02 IN

## 2024-06-14 DIAGNOSIS — Z79.4 CONTROLLED TYPE 2 DIABETES MELLITUS WITHOUT COMPLICATION, WITH LONG-TERM CURRENT USE OF INSULIN (HCC): Primary | ICD-10-CM

## 2024-06-14 DIAGNOSIS — E11.9 CONTROLLED TYPE 2 DIABETES MELLITUS WITHOUT COMPLICATION, WITH LONG-TERM CURRENT USE OF INSULIN (HCC): Primary | ICD-10-CM

## 2024-06-14 LAB
CARTRIDGE EXPIRATION DATE: ABNORMAL DATE
GLUCOSE BLOOD: 131
HEMOGLOBIN A1C: 7.2 % (ref 4.3–5.6)
TEST STRIP EXPIRATION DATE: NORMAL DATE
TEST STRIP LOT #: NORMAL NUMERIC

## 2024-06-14 PROCEDURE — 83036 HEMOGLOBIN GLYCOSYLATED A1C: CPT | Performed by: INTERNAL MEDICINE

## 2024-06-14 PROCEDURE — 3078F DIAST BP <80 MM HG: CPT | Performed by: INTERNAL MEDICINE

## 2024-06-14 PROCEDURE — 3074F SYST BP LT 130 MM HG: CPT | Performed by: INTERNAL MEDICINE

## 2024-06-14 PROCEDURE — 99214 OFFICE O/P EST MOD 30 MIN: CPT | Performed by: INTERNAL MEDICINE

## 2024-06-14 PROCEDURE — 3051F HG A1C>EQUAL 7.0%<8.0%: CPT | Performed by: INTERNAL MEDICINE

## 2024-06-14 PROCEDURE — 82947 ASSAY GLUCOSE BLOOD QUANT: CPT | Performed by: INTERNAL MEDICINE

## 2024-06-14 PROCEDURE — 3008F BODY MASS INDEX DOCD: CPT | Performed by: INTERNAL MEDICINE

## 2024-06-14 NOTE — PROGRESS NOTES
Name: Lesley Tracy  Date: 2024      HISTORY OF PRESENT ILLNESS   Lesley Tracy is a 48 year old female who presents for diabetes mellitus, diagnosed in .         She moved to [place] for [employer] transfer but then unhappy in position so moved back to [place].  She is now looking for new position.     Prior HbA, C or glycohemoglobin were 9.6% 2014; 5.9% 10/2014; 8.1% 2015; 8.8% 2015; 11.1% 2015; 8.4% 2016; 6.8% 2016; 8.7% 2016; 11.0% 3/2018; 10.2% 2018; 10.3% 2018; 7.8% 2018; 10.4% 10/2019; 9.3% 2020; 7.5% 2020; 8.4% 2020; 7.3% 3/2021; 8.3% 2022; 8.9% 2022; 7.2% 2023; 6.2% 3/17/2023; 8.2% 2023; 6.9% 2023; 7.0% 2024; 7.2% POC Today     Dietary compliance: Fair, tries to follow a low carb diet     Exercise: No  Polyuria/polydipsia: No  Blurred vision: No    Episodes of hypoglycemia: no  Blood Glucose:  Checking 1-2 times per day  Fastin-150    Medications for DM   Basaglar 50 units SQ daily  Jardiance 25mg PO daily   Ozempic 1mg subcutaneous weekly     Mounjaro d/c'd due to abdominal pain   Victoza was still too expensive   Tresiba stopped due to back pain   toujeo -lack of glycemic control     -->Intolerant of Metformin     REVIEW OF SYSTEMS  Eyes: Diabetic retinopathy present: No            Most recent visit to eye doctor in last 12 months: Yes    CV: Cardiovascular disease present: No         Hypertension present: Yes         Hyperlipidemia present: Yes         Peripheral Vascular Disease present: No    : Nephropathy present: No    Neuro: Neuropathy present: No    Skin: Infection or ulceration: No    Osteoporosis: No    Thyroid disease: Yes, she has history of multinodular goiter s/p US guided FNA several years ago.  Most recent US demonstrated stable size of thyroid nodules.        Medications:     Current Outpatient Medications:     semaglutide (OZEMPIC, 1 MG/DOSE,) 4 MG/3ML Subcutaneous Solution Pen-injector, Inject 1 mg into the skin once a week., Disp: 3 mL,  Rfl: 1    insulin glargine (BASAGLAR KWIKPEN) 100 UNIT/ML Subcutaneous Solution Pen-injector, Inject 50 Units into the skin nightly., Disp: 45 mL, Rfl: 1    Insulin Pen Needle (PEN NEEDLES) 31G X 6 MM Does not apply Misc, Inject 4 times a day as directed, Disp: 400 each, Rfl: 1    Empagliflozin (JARDIANCE) 25 MG Oral Tab, Take 1 tablet by mouth daily., Disp: 90 tablet, Rfl: 1    Microlet Lancets Does not apply Misc, Check 2 times per day, Disp: 200 each, Rfl: 1    Insulin Pen Needle (BD PEN NEEDLE ESTEFANY U/F) 32G X 4 MM Does not apply Misc, Inject 2 times daily, Disp: 100 each, Rfl: 3    Insulin Pen Needle (RELION PEN NEEDLES) 32G X 4 MM Does not apply Misc, Use with injections 2 times daily, Disp: 200 each, Rfl: 3    Lancets 30G Does not apply Misc, Check 2 times daily, lancets for contour, Disp: 100 each, Rfl: 6    Insulin Syringe 30G X 5/16\" 1 ML Does not apply Misc, Use with insulin 4 times per day, Disp: 150 each, Rfl: 0    Tirzepatide (MOUNJARO) 10 MG/0.5ML Subcutaneous Solution Pen-injector, Inject 10 mg into the skin once a week. (Patient not taking: Reported on 6/14/2024), Disp: 2 mL, Rfl: 0    losartan 25 MG Oral Tab, Take 1 tablet (25 mg total) by mouth daily., Disp: 90 tablet, Rfl: 1    Tirzepatide (MOUNJARO) 7.5 MG/0.5ML Subcutaneous Solution Pen-injector, Inject 7.5 mg into the skin once a week. (Patient not taking: Reported on 6/14/2024), Disp: 6 mL, Rfl: 1    triamcinolone 55 MCG/ACT Nasal Aerosol, 1 spray by Nasal route 2 (two) times daily., Disp: 10.8 mL, Rfl: 0    rosuvastatin 20 MG Oral Tab, Take 1 tablet (20 mg total) by mouth nightly., Disp: 90 tablet, Rfl: 0    fluconazole 150 MG Oral Tab, Take 1 tablet (150 mg total) by mouth once a week., Disp: 12 tablet, Rfl: 1    ergocalciferol 1.25 MG (32056 UT) Oral Cap, Take 1 capsule (50,000 Units total) by mouth once a week., Disp: 4 capsule, Rfl: 3    pantoprazole 20 MG Oral Tab EC, Take 1 tablet (20 mg total) by mouth before breakfast., Disp: 90  tablet, Rfl: 2     Allergies:   Allergies   Allergen Reactions    Metformin NAUSEA AND VOMITING and DIARRHEA    Morphine ITCHING, RESTLESSNESS and Tightness in Chest    Insulin Degludec OTHER (SEE COMMENTS)     Back pain       Social History:   Social History     Socioeconomic History    Marital status:    Tobacco Use    Smoking status: Never    Smokeless tobacco: Never   Vaping Use    Vaping status: Never Used   Substance and Sexual Activity    Alcohol use: No    Drug use: No   Other Topics Concern    Caffeine Concern Yes     Comment: tea; 2 cups a week.    Left Handed Yes    Right Handed No    Currently spends a great deal of time in the sun No    History of tanning No    Hx of Spending Great Deal of Time in Sun No    Bad sunburns in the past No    Tanning Salons in the Past No    Hx of Radiation Treatments No       Medical History:   Past Medical History:    Diabetes mellitus (HCC) no BDR     Diabetic retinopathy (HCC)    High blood pressure    Myopia of both eyes with astigmatism    OU    Obesity (BMI 30-39.9)    Other and unspecified hyperlipidemia    Sleep apnea    Stress    Type II or unspecified type diabetes mellitus without mention of complication, not stated as uncontrolled       Surgical history:   Past Surgical History:   Procedure Laterality Date    Colonoscopy N/A 11/3/2022    Procedure: COLONOSCOPY;  Surgeon: Loretta Jovel MD;  Location: [facility] ENDOSCOPY    Upper gi endoscopy,exam           PHYSICAL EXAM  /73   Pulse 91   Ht 5' 4.02\" (1.626 m)   Wt 225 lb (102.1 kg)   LMP 10/01/2023 (Approximate)   BMI 38.60 kg/m²     General Appearance:  alert, well developed, in no acute distress  Eyes:  normal conjunctivae, sclera., normal sclera and normal pupils  Ears/Nose/Mouth/Throat/Neck:  no palpable thyroid nodules or cervical lymphadenopathy, diffusely enlarged thyroid  Back: no kyphosis or back tenderness  Musculoskeletal:  normal muscle strength and tone  Skin:  normal moisture and skin  texture  Neuro:  sensory grossly intact and motor grossly intact  Psychiatric:  oriented to time, self, and place  Nutritional:  no abnormal weight gain or loss    ASSESSMENT/PLAN:      1. Diabetes Mellitus Type 2, controlled  -controlled, HgA1c 7.2% -->stable   -Discussed importance of glycemic control to prevent complications of diabetes  -Discussed complications of diabetes include retinopathy, neuropathy, nephropathy and cardiovascular disease  -Discussed importance of SBGM  -Discussed importance of low CHO diet  -continue with Lantus 50 units subcutaneous daily   -Discontinue Ozempic  -Start Mounjaro 7.5mg subcutaneous weekly, verbalized understanding of risks and benefits -->will try to change back again   -Continue Jardiance 25mg PO daily  -No Metformin therapy given significant GI symptoms  -Normotensive  -UTD with optho  -LDL elevated, Restart Rosuvastatin 5mg PO daily, verbalized understanding of risks and benefits   -Normal lipids   -Normal foot exam performed today  -Recheck yearly labs     RTC 4 months    Giuliana Alvares  6/14/2024

## 2024-06-15 ENCOUNTER — LAB ENCOUNTER (OUTPATIENT)
Dept: LAB | Facility: HOSPITAL | Age: 49
End: 2024-06-15
Attending: INTERNAL MEDICINE

## 2024-06-15 DIAGNOSIS — Z79.4 CONTROLLED TYPE 2 DIABETES MELLITUS WITHOUT COMPLICATION, WITH LONG-TERM CURRENT USE OF INSULIN (HCC): ICD-10-CM

## 2024-06-15 DIAGNOSIS — E11.9 CONTROLLED TYPE 2 DIABETES MELLITUS WITHOUT COMPLICATION, WITH LONG-TERM CURRENT USE OF INSULIN (HCC): ICD-10-CM

## 2024-06-15 LAB
ALBUMIN SERPL-MCNC: 4.5 G/DL (ref 3.2–4.8)
ALBUMIN/GLOB SERPL: 1.6 {RATIO} (ref 1–2)
ALP LIVER SERPL-CCNC: 68 U/L
ALT SERPL-CCNC: 12 U/L
ANION GAP SERPL CALC-SCNC: 9 MMOL/L (ref 0–18)
AST SERPL-CCNC: 15 U/L (ref ?–34)
BASOPHILS # BLD AUTO: 0.04 X10(3) UL (ref 0–0.2)
BASOPHILS NFR BLD AUTO: 0.5 %
BILIRUB SERPL-MCNC: 0.6 MG/DL (ref 0.3–1.2)
BUN BLD-MCNC: 15 MG/DL (ref 9–23)
BUN/CREAT SERPL: 19.7 (ref 10–20)
CALCIUM BLD-MCNC: 9 MG/DL (ref 8.7–10.4)
CHLORIDE SERPL-SCNC: 110 MMOL/L (ref 98–112)
CHOLEST SERPL-MCNC: 159 MG/DL (ref ?–200)
CO2 SERPL-SCNC: 23 MMOL/L (ref 21–32)
CREAT BLD-MCNC: 0.76 MG/DL
CREAT UR-SCNC: 89 MG/DL
DEPRECATED RDW RBC AUTO: 40.1 FL (ref 35.1–46.3)
EGFRCR SERPLBLD CKD-EPI 2021: 98 ML/MIN/1.73M2 (ref 60–?)
EOSINOPHIL # BLD AUTO: 0.16 X10(3) UL (ref 0–0.7)
EOSINOPHIL NFR BLD AUTO: 1.8 %
ERYTHROCYTE [DISTWIDTH] IN BLOOD BY AUTOMATED COUNT: 13.9 % (ref 11–15)
FASTING PATIENT LIPID ANSWER: YES
FASTING STATUS PATIENT QL REPORTED: YES
GLOBULIN PLAS-MCNC: 2.9 G/DL (ref 2–3.5)
GLUCOSE BLD-MCNC: 98 MG/DL (ref 70–99)
HCT VFR BLD AUTO: 42.1 %
HDLC SERPL-MCNC: 47 MG/DL (ref 40–59)
HGB BLD-MCNC: 14 G/DL
IMM GRANULOCYTES # BLD AUTO: 0.02 X10(3) UL (ref 0–1)
IMM GRANULOCYTES NFR BLD: 0.2 %
LDLC SERPL CALC-MCNC: 96 MG/DL (ref ?–100)
LYMPHOCYTES # BLD AUTO: 3.05 X10(3) UL (ref 1–4)
LYMPHOCYTES NFR BLD AUTO: 35.1 %
MCH RBC QN AUTO: 27 PG (ref 26–34)
MCHC RBC AUTO-ENTMCNC: 33.3 G/DL (ref 31–37)
MCV RBC AUTO: 81.3 FL
MICROALBUMIN UR-MCNC: 0.4 MG/DL
MICROALBUMIN/CREAT 24H UR-RTO: 4.5 UG/MG (ref ?–30)
MONOCYTES # BLD AUTO: 0.64 X10(3) UL (ref 0.1–1)
MONOCYTES NFR BLD AUTO: 7.4 %
NEUTROPHILS # BLD AUTO: 4.78 X10 (3) UL (ref 1.5–7.7)
NEUTROPHILS # BLD AUTO: 4.78 X10(3) UL (ref 1.5–7.7)
NEUTROPHILS NFR BLD AUTO: 55 %
NONHDLC SERPL-MCNC: 112 MG/DL (ref ?–130)
OSMOLALITY SERPL CALC.SUM OF ELEC: 295 MOSM/KG (ref 275–295)
PLATELET # BLD AUTO: 408 10(3)UL (ref 150–450)
POTASSIUM SERPL-SCNC: 3.5 MMOL/L (ref 3.5–5.1)
PROT SERPL-MCNC: 7.4 G/DL (ref 5.7–8.2)
RBC # BLD AUTO: 5.18 X10(6)UL
SODIUM SERPL-SCNC: 142 MMOL/L (ref 136–145)
TRIGL SERPL-MCNC: 86 MG/DL (ref 30–149)
TSI SER-ACNC: 0.63 MIU/ML (ref 0.55–4.78)
VLDLC SERPL CALC-MCNC: 14 MG/DL (ref 0–30)
WBC # BLD AUTO: 8.7 X10(3) UL (ref 4–11)

## 2024-06-15 PROCEDURE — 80053 COMPREHEN METABOLIC PANEL: CPT

## 2024-06-15 PROCEDURE — 84443 ASSAY THYROID STIM HORMONE: CPT

## 2024-06-15 PROCEDURE — 36415 COLL VENOUS BLD VENIPUNCTURE: CPT

## 2024-06-15 PROCEDURE — 82570 ASSAY OF URINE CREATININE: CPT

## 2024-06-15 PROCEDURE — 82043 UR ALBUMIN QUANTITATIVE: CPT

## 2024-06-15 PROCEDURE — 85025 COMPLETE CBC W/AUTO DIFF WBC: CPT

## 2024-06-15 PROCEDURE — 80061 LIPID PANEL: CPT

## 2024-07-07 ENCOUNTER — PATIENT MESSAGE (OUTPATIENT)
Dept: ENDOCRINOLOGY CLINIC | Facility: CLINIC | Age: 49
End: 2024-07-07

## 2024-07-08 NOTE — TELEPHONE ENCOUNTER
Dr. Alvares,     Pt requesting refill on triamcinolone 55 MCG/ACT Aero. Please advise if pt should obtain this OTC.

## 2024-07-29 RX ORDER — LOSARTAN POTASSIUM 25 MG/1
25 TABLET ORAL DAILY
Qty: 90 TABLET | Refills: 1 | Status: SHIPPED | OUTPATIENT
Start: 2024-07-29

## 2024-07-29 RX ORDER — INSULIN GLARGINE 100 [IU]/ML
50 INJECTION, SOLUTION SUBCUTANEOUS NIGHTLY
Qty: 45 ML | Refills: 1 | Status: SHIPPED | OUTPATIENT
Start: 2024-07-29

## 2024-07-29 NOTE — TELEPHONE ENCOUNTER
Endocrine refill protocol for basal insulins     Protocol Criteria: PASSED  - Appointment with Endocrinology completed in the last 6 months or scheduled in the next 3 months    - A1c result completed in the last 6 months and is below 8.5%     Verify appointment has been completed or scheduled in the appropriate timeline. If so can send a 90 day supply with 1 refill per provider protocol.    Verify A1c has been completed within the last 6 months and is below 8.5%     Last completed office visit:6/14/2024 Giuliana Alvares MD   Next scheduled Follow up: 10/25/24     Last A1c result: Last A1c value was 7.2% done 6/14/2024.

## 2024-09-11 ENCOUNTER — OFFICE VISIT (OUTPATIENT)
Dept: INTERNAL MEDICINE CLINIC | Facility: CLINIC | Age: 49
End: 2024-09-11

## 2024-09-11 VITALS
HEIGHT: 64.02 IN | BODY MASS INDEX: 37.87 KG/M2 | DIASTOLIC BLOOD PRESSURE: 86 MMHG | SYSTOLIC BLOOD PRESSURE: 133 MMHG | HEART RATE: 74 BPM | WEIGHT: 221.81 LBS

## 2024-09-11 DIAGNOSIS — E11.65 TYPE 2 DIABETES MELLITUS WITH HYPERGLYCEMIA, WITH LONG-TERM CURRENT USE OF INSULIN (HCC): ICD-10-CM

## 2024-09-11 DIAGNOSIS — Z01.419 ENCOUNTER FOR ROUTINE GYNECOLOGICAL EXAMINATION WITH PAPANICOLAOU SMEAR OF CERVIX: ICD-10-CM

## 2024-09-11 DIAGNOSIS — M25.552 BILATERAL HIP PAIN: ICD-10-CM

## 2024-09-11 DIAGNOSIS — Z12.31 ENCOUNTER FOR SCREENING MAMMOGRAM FOR MALIGNANT NEOPLASM OF BREAST: ICD-10-CM

## 2024-09-11 DIAGNOSIS — Z79.4 TYPE 2 DIABETES MELLITUS WITH HYPERGLYCEMIA, WITH LONG-TERM CURRENT USE OF INSULIN (HCC): ICD-10-CM

## 2024-09-11 DIAGNOSIS — M25.551 BILATERAL HIP PAIN: ICD-10-CM

## 2024-09-11 DIAGNOSIS — Z00.00 PHYSICAL EXAM, ANNUAL: Primary | ICD-10-CM

## 2024-09-11 PROCEDURE — 3079F DIAST BP 80-89 MM HG: CPT | Performed by: INTERNAL MEDICINE

## 2024-09-11 PROCEDURE — 3008F BODY MASS INDEX DOCD: CPT | Performed by: INTERNAL MEDICINE

## 2024-09-11 PROCEDURE — 99396 PREV VISIT EST AGE 40-64: CPT | Performed by: INTERNAL MEDICINE

## 2024-09-11 PROCEDURE — 3061F NEG MICROALBUMINURIA REV: CPT | Performed by: INTERNAL MEDICINE

## 2024-09-11 PROCEDURE — 3075F SYST BP GE 130 - 139MM HG: CPT | Performed by: INTERNAL MEDICINE

## 2024-09-11 RX ORDER — ROSUVASTATIN CALCIUM 20 MG/1
20 TABLET, COATED ORAL NIGHTLY
Qty: 90 TABLET | Refills: 0 | Status: SHIPPED | OUTPATIENT
Start: 2024-09-11

## 2024-09-11 RX ORDER — METHYLPREDNISOLONE 4 MG
4 TABLET, DOSE PACK ORAL AS DIRECTED
COMMUNITY
Start: 2024-07-20 | End: 2024-09-11 | Stop reason: ALTCHOICE

## 2024-09-11 NOTE — PROGRESS NOTES
Lesley Tracy is a 49 year old female.  Chief Complaint   Patient presents with    Physical    Gyn Exam       HPI:   Pt comes for her annual physical   C/c annual physical  C/o Still has to do the PT for the hip, still has the pain   Moved to [place] for her job but her parents got ill so moved back to take care of them       HISTORY   off manjauro due to pain in the side and then also after it was restarted realized it was not covered by insurance and the A1c had gone up          History   6/18 visit   C/o headaches x 3 days   Here with   juancarlos   Has had sinus headaches before but not this bad   Can sleep or eat   Nose is running and when she sneezes the whole head hurts   PND and it burns    throbbing - constant 10/10 - hasnt taken anything for it   Go to the ER yesterday CT scan was negative labs unremarkable and was given Reglan meloxicam and Butalmetal APAP  That is not helping           Problem list  Diabetes  Hypertension  Allergic rhinitis  Migraines  gerd-         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 90 tablet 0    BASAGLAR KWIKPEN 100 UNIT/ML Subcutaneous Solution Pen-injector INJECT 50 UNITS SUBCUTANEOUSLY NIGHTLY 45 mL 1    LOSARTAN 25 MG Oral Tab Take 1 tablet by mouth once daily 90 tablet 1    Tirzepatide (MOUNJARO) 7.5 MG/0.5ML Subcutaneous Solution Pen-injector Inject 7.5 mg into the skin once a week. 6 mL 1    Insulin Pen Needle (PEN NEEDLES) 31G X 6 MM Does not apply Misc Inject 4 times a day as directed 400 each 1    Empagliflozin (JARDIANCE) 25 MG Oral Tab Take 1 tablet by mouth daily. 90 tablet 1    pantoprazole 20 MG Oral Tab EC Take 1 tablet (20 mg total) by mouth before breakfast. 90 tablet 2    Microlet Lancets Does not apply Misc Check 2 times per day 200 each 1    Insulin Pen Needle (BD PEN NEEDLE ESTEFANY U/F) 32G  X 4 MM Does not apply Misc Inject 2 times daily 100 each 3    Insulin Pen Needle (RELION PEN NEEDLES) 32G X 4 MM Does not apply Misc Use with injections 2 times daily 200 each 3    Lancets 30G Does not apply Misc Check 2 times daily, lancets for contour 100 each 6    Insulin Syringe 30G X 5/16\" 1 ML Does not apply Misc Use with insulin 4 times per day 150 each 0      Past Medical History:    Diabetes mellitus (HCC) no BDR     Diabetic retinopathy (HCC)    High blood pressure    Myopia of both eyes with astigmatism    OU    Obesity (BMI 30-39.9)    Other and unspecified hyperlipidemia    Sleep apnea    Stress    Type II or unspecified type diabetes mellitus without mention of complication, not stated as uncontrolled      Past Surgical History:   Procedure Laterality Date    Colonoscopy N/A 11/3/2022    Procedure: COLONOSCOPY;  Surgeon: Loretta Jovel MD;  Location: [facility] ENDOSCOPY    Upper gi endoscopy,exam        Social History:  Social History     Socioeconomic History    Marital status:    Tobacco Use    Smoking status: Never    Smokeless tobacco: Never   Vaping Use    Vaping status: Never Used   Substance and Sexual Activity    Alcohol use: No    Drug use: No   Other Topics Concern    Caffeine Concern Yes     Comment: tea; 2 cups a week.    Left Handed Yes    Right Handed No    Currently spends a great deal of time in the sun No    History of tanning No    Hx of Spending Great Deal of Time in Sun No    Bad sunburns in the past No    Tanning Salons in the Past No    Hx of Radiation Treatments No        REVIEW OF SYSTEMS:   GENERAL HEALTH: No fevers, chills, sweats, fatigue  VISION: No recent vision problems, blurry vision or double vision  HEENT: No decreased hearing ear pain nasal congestion or sore throat  SKIN: denies any unusual skin lesions or rashes  RESPIRATORY: denies shortness of breath, cough, wheezing  CARDIOVASCULAR: denies chest pain on exertion, palpitations, swelling in feet--just the  ankles  GI: denies abdominal pain and denies heartburn, nausea or vomiting  : No Pain on urination, change in the color of urine, discharge, urinating frequently  MUS: No back pain, joint pain, muscle pain  NEURO: denies headaches , anxiety, depression    EXAM:   /86   Pulse 74   Ht 5' 4.02\" (1.626 m)   Wt 221 lb 12.8 oz (100.6 kg)   LMP 08/31/2024 (Approximate)   BMI 38.05 kg/m²   GENERAL: well developed, well nourished,in no apparent distress  SKIN: no rashes,no suspicious lesions  Bilateral barefoot skin diabetic exam is normal, visualized feet and the appearance is normal.  Bilateral monofilament/sensation of both feet is normal.  Pulsation pedal pulse exam of both lower legs/feet is normal as well.  HEENT: atraumatic, normocephalic,ears and throat are clear, no frontal or maxillary sinus tenderness, pupils equal reactive to light bilaterally, extraocular muscles intact  NECK: supple,no adenopathy,nontender   LUNGS: clear to auscultation, no wheeze  CARDIO: RRR + systolic murmur  GI: good BS's,no masses or tenderness  EXTREMITIES: no cyanosis, or edema  BREAST: Bilateral breasts without any lumps, bilateral axilla without any lymphadenopathy, no nipple retraction or  discharge      external genitalia-normal appearance, hair distribution, no lesions  Urethral meatus-normal in size, location, without lesions or prolapse  Bladder-no fullness, masses or tenderness  Vagina-normal appearance without lesions, no abnormal discharge  Cervix-normal without tenderness on motion  Uterus-normal in size, contour, position, mobility without tenderness  Adnexa-normal without masses or tenderness  Perineum normal  Rectovaginal-no masses  Anus no hemorrhoids seen    ASSESSMENT AND PLAN:   Diagnoses and all orders for this visit:    Physical exam, annual  Advised patient to watch what she eats exercise, seatbelt use no texting and driving, sunscreen use advised    Encounter for screening mammogram for malignant  neoplasm of breast  -     [facility] ANDERS 2D+3D SCREENING BILAT (CPT=77067/41286); Future  Did Clinical breast exam, review self-exam and ordered mammogram    Bilateral hip pain  -     PHYSICAL THERAPY - INTERNAL  Ordered   Encounter for routine gynecological examination with Papanicolaou smear of cervix  -     ThinPrep PAP Smear; Future  -     Hpv Dna  High Risk , Thin Prep Collect; Future  Pap smear done   Type 2 diabetes mellitus with hyperglycemia, with long-term current use of insulin (HCC)  -     rosuvastatin 20 MG Oral Tab; Take 1 tablet (20 mg total) by mouth nightly.  -     Ophthalmology Referral - In Network  Hba1c 10.2 on April 2018 and  7.8 in sept 9/18, 10.4 on 10/19 and 7.3 on 3/2021  and 8.3 on 5/2022 and and 7 on 2/2024    u vicente- 6/2024    Eye exam sees Dr. Covarrubias  last visit was on 6/2020 -no retinopathy, dr nguyen [facility], but needs new dr Josh Daly , on arb, on statin but she stopped   Diet -- advised to follow a low carb, low sugar diet , try to be consistent   Feet  9/11/2024               Exercise 30 min a day   Sees dr mendoza -              Preventative medicine  Pap smear done March 2018 and 2021 -normal-- dr guallpa   Mammogram 8/2023   Ccscope- 12/2022 rpt in 5 yrs   Labs 6/2024  reviewed    Flu shot up-to-date      The patient indicates understanding of these issues and agrees to the plan.  No follow-ups on file.

## 2024-09-12 LAB — HPV E6+E7 MRNA CVX QL NAA+PROBE: POSITIVE

## 2024-09-15 DIAGNOSIS — R87.810 PAP SMEAR OF CERVIX SHOWS HIGH RISK HPV PRESENT: Primary | ICD-10-CM

## 2024-09-17 LAB
HPV16 DNA CVX QL PROBE+SIG AMP: NEGATIVE
HPV18 DNA CVX QL PROBE+SIG AMP: NEGATIVE

## 2024-10-25 ENCOUNTER — HOSPITAL ENCOUNTER (OUTPATIENT)
Dept: MAMMOGRAPHY | Facility: HOSPITAL | Age: 49
Discharge: HOME | End: 2024-10-25
Attending: INTERNAL MEDICINE
Payer: COMMERCIAL

## 2024-10-25 ENCOUNTER — OFFICE VISIT (OUTPATIENT)
Dept: ENDOCRINOLOGY CLINIC | Facility: CLINIC | Age: 49
End: 2024-10-25
Payer: COMMERCIAL

## 2024-10-25 VITALS
BODY MASS INDEX: 38 KG/M2 | HEART RATE: 90 BPM | DIASTOLIC BLOOD PRESSURE: 80 MMHG | WEIGHT: 221 LBS | SYSTOLIC BLOOD PRESSURE: 122 MMHG

## 2024-10-25 DIAGNOSIS — E11.9 CONTROLLED TYPE 2 DIABETES MELLITUS WITHOUT COMPLICATION, WITH LONG-TERM CURRENT USE OF INSULIN (HCC): Primary | ICD-10-CM

## 2024-10-25 DIAGNOSIS — Z79.4 CONTROLLED TYPE 2 DIABETES MELLITUS WITHOUT COMPLICATION, WITH LONG-TERM CURRENT USE OF INSULIN (HCC): Primary | ICD-10-CM

## 2024-10-25 DIAGNOSIS — Z12.31 ENCOUNTER FOR SCREENING MAMMOGRAM FOR MALIGNANT NEOPLASM OF BREAST: ICD-10-CM

## 2024-10-25 DIAGNOSIS — Z79.4 TYPE 2 DIABETES MELLITUS WITH HYPERGLYCEMIA, WITH LONG-TERM CURRENT USE OF INSULIN (HCC): ICD-10-CM

## 2024-10-25 DIAGNOSIS — E11.65 TYPE 2 DIABETES MELLITUS WITH HYPERGLYCEMIA, WITH LONG-TERM CURRENT USE OF INSULIN (HCC): ICD-10-CM

## 2024-10-25 DIAGNOSIS — E11.9 DIABETES MELLITUS TYPE 2 WITHOUT RETINOPATHY (HCC): ICD-10-CM

## 2024-10-25 LAB
GLUCOSE BLOOD: 100
HEMOGLOBIN A1C: 7 % (ref 4.3–5.6)
TEST STRIP LOT #: NORMAL NUMERIC

## 2024-10-25 PROCEDURE — 77067 SCR MAMMO BI INCL CAD: CPT | Performed by: INTERNAL MEDICINE

## 2024-10-25 PROCEDURE — 77063 BREAST TOMOSYNTHESIS BI: CPT | Performed by: INTERNAL MEDICINE

## 2024-10-25 PROCEDURE — 3079F DIAST BP 80-89 MM HG: CPT | Performed by: INTERNAL MEDICINE

## 2024-10-25 PROCEDURE — 3051F HG A1C>EQUAL 7.0%<8.0%: CPT | Performed by: INTERNAL MEDICINE

## 2024-10-25 PROCEDURE — 83036 HEMOGLOBIN GLYCOSYLATED A1C: CPT | Performed by: INTERNAL MEDICINE

## 2024-10-25 PROCEDURE — 99214 OFFICE O/P EST MOD 30 MIN: CPT | Performed by: INTERNAL MEDICINE

## 2024-10-25 PROCEDURE — 82947 ASSAY GLUCOSE BLOOD QUANT: CPT | Performed by: INTERNAL MEDICINE

## 2024-10-25 PROCEDURE — 3074F SYST BP LT 130 MM HG: CPT | Performed by: INTERNAL MEDICINE

## 2024-10-25 RX ORDER — TIRZEPATIDE 10 MG/.5ML
10 INJECTION, SOLUTION SUBCUTANEOUS WEEKLY
Qty: 6 ML | Refills: 1 | Status: SHIPPED | OUTPATIENT
Start: 2024-10-25

## 2024-10-25 RX ORDER — BLOOD SUGAR DIAGNOSTIC
STRIP MISCELLANEOUS
Qty: 100 EACH | Refills: 1 | Status: SHIPPED | OUTPATIENT
Start: 2024-10-25

## 2024-10-25 RX ORDER — ROSUVASTATIN CALCIUM 20 MG/1
20 TABLET, COATED ORAL NIGHTLY
Qty: 90 TABLET | Refills: 1 | Status: SHIPPED | OUTPATIENT
Start: 2024-10-25

## 2024-10-25 RX ORDER — LOSARTAN POTASSIUM 25 MG/1
25 TABLET ORAL DAILY
Qty: 90 TABLET | Refills: 1 | Status: SHIPPED | OUTPATIENT
Start: 2024-10-25

## 2024-10-25 RX ORDER — INSULIN GLARGINE 100 [IU]/ML
50 INJECTION, SOLUTION SUBCUTANEOUS NIGHTLY
Qty: 30 ML | Refills: 1 | Status: SHIPPED | OUTPATIENT
Start: 2024-10-25

## 2024-10-25 RX ORDER — INSULIN GLARGINE 100 [IU]/ML
50 INJECTION, SOLUTION SUBCUTANEOUS NIGHTLY
COMMUNITY

## 2024-10-25 NOTE — PROGRESS NOTES
Name: Lesley Tracy  Date: 10/25/2024      HISTORY OF PRESENT ILLNESS   Lesley Tracy is a 49 year old female who presents for diabetes mellitus, diagnosed in .         [place] [facility] [place] [facility] but then unhappy in position so moved back to [place].  She is now looking for new position.     Prior HbA, C or glycohemoglobin were 9.6% 2014; 5.9% 10/2014; 8.1% 2015; 8.8% 2015; 11.1% 2015; 8.4% 2016; 6.8% 2016; 8.7% 2016; 11.0% 3/2018; 10.2% 2018; 10.3% 2018; 7.8% 2018; 10.4% 10/2019; 9.3% 2020; 7.5% 2020; 8.4% 2020; 7.3% 3/2021; 8.3% 2022; 8.9% 2022; 7.2% 2023; 6.2% 3/17/2023; 8.2% 2023; 6.9% 2023; 7.0% 2024; 7.2% 2024; 7.0% POC Today     Dietary compliance: Fair, tries to follow a low carb diet     Exercise: No  Polyuria/polydipsia: No  Blurred vision: No    Episodes of hypoglycemia: no  Blood Glucose:  Checking 1-2 times per day  Fastin-150    Medications for DM   Lantus 50 units SQ daily  Jardiance 25mg PO daily   Mounjaro 7.5mg subcutaneous weekly     Mounjaro d/c'd due to abdominal pain   Victoza was still too expensive   Tresiba stopped due to back pain   toujeo -lack of glycemic control     -->Intolerant of Metformin     REVIEW OF SYSTEMS  Eyes: Diabetic retinopathy present: No            Most recent visit to eye doctor in last 12 months: Yes    CV: Cardiovascular disease present: No         Hypertension present: Yes         Hyperlipidemia present: Yes         Peripheral Vascular Disease present: No    : Nephropathy present: No    Neuro: Neuropathy present: No    Skin: Infection or ulceration: No    Osteoporosis: No    Thyroid disease: Yes, she has history of multinodular goiter s/p US guided FNA several years ago.  Most recent US demonstrated stable size of thyroid nodules.        Medications:     Current Outpatient Medications:     insulin glargine 100 UNIT/ML Subcutaneous Solution, Inject 50 Units into the skin nightly., Disp: , Rfl:      rosuvastatin 20 MG Oral Tab, Take 1 tablet (20 mg total) by mouth nightly., Disp: 90 tablet, Rfl: 0    LOSARTAN 25 MG Oral Tab, Take 1 tablet by mouth once daily, Disp: 90 tablet, Rfl: 1    Tirzepatide (MOUNJARO) 7.5 MG/0.5ML Subcutaneous Solution Pen-injector, Inject 7.5 mg into the skin once a week., Disp: 6 mL, Rfl: 1    Insulin Pen Needle (PEN NEEDLES) 31G X 6 MM Does not apply Misc, Inject 4 times a day as directed, Disp: 400 each, Rfl: 1    Empagliflozin (JARDIANCE) 25 MG Oral Tab, Take 1 tablet by mouth daily., Disp: 90 tablet, Rfl: 1    pantoprazole 20 MG Oral Tab EC, Take 1 tablet (20 mg total) by mouth before breakfast., Disp: 90 tablet, Rfl: 2    Microlet Lancets Does not apply Misc, Check 2 times per day, Disp: 200 each, Rfl: 1    Insulin Pen Needle (BD PEN NEEDLE ESTEFANY U/F) 32G X 4 MM Does not apply Misc, Inject 2 times daily, Disp: 100 each, Rfl: 3    Insulin Pen Needle (RELION PEN NEEDLES) 32G X 4 MM Does not apply Misc, Use with injections 2 times daily, Disp: 200 each, Rfl: 3    Lancets 30G Does not apply Misc, Check 2 times daily, lancets for contour, Disp: 100 each, Rfl: 6    Insulin Syringe 30G X 5/16\" 1 ML Does not apply Misc, Use with insulin 4 times per day, Disp: 150 each, Rfl: 0     Allergies:   Allergies   Allergen Reactions    Metformin NAUSEA AND VOMITING and DIARRHEA    Morphine ITCHING, RESTLESSNESS and Tightness in Chest    Insulin Degludec OTHER (SEE COMMENTS)     Back pain       Social History:   Social History     Socioeconomic History    Marital status:    Tobacco Use    Smoking status: Never    Smokeless tobacco: Never   Vaping Use    Vaping status: Never Used   Substance and Sexual Activity    Alcohol use: No    Drug use: No   Other Topics Concern    Caffeine Concern Yes     Comment: tea; 2 cups a week.    Left Handed Yes    Right Handed No    Currently spends a great deal of time in the sun No    History of tanning No    Hx of Spending Great Deal of Time in Sun No    Bad  sunburns in the past No    Tanning Salons in the Past No    Hx of Radiation Treatments No       Medical History:   Past Medical History:    Diabetes mellitus (HCC) no BDR     Diabetic retinopathy (HCC)    High blood pressure    Myopia of both eyes with astigmatism    OU    Obesity (BMI 30-39.9)    Other and unspecified hyperlipidemia    Sleep apnea    Stress    Type II or unspecified type diabetes mellitus without mention of complication, not stated as uncontrolled       Surgical history:   Past Surgical History:   Procedure Laterality Date    Colonoscopy N/A 11/3/2022    Procedure: COLONOSCOPY;  Surgeon: Loretta Jovel MD;  Location: [facility] ENDOSCOPY    Upper gi endoscopy,exam           PHYSICAL EXAM  /80   Pulse 90   Wt 221 lb (100.2 kg)   LMP 08/31/2024 (Approximate)   BMI 37.91 kg/m²     General Appearance:  alert, well developed, in no acute distress  Eyes:  normal conjunctivae, sclera., normal sclera and normal pupils  Ears/Nose/Mouth/Throat/Neck:  no palpable thyroid nodules or cervical lymphadenopathy, diffusely enlarged thyroid  Back: no kyphosis or back tenderness  Musculoskeletal:  normal muscle strength and tone  Skin:  normal moisture and skin texture  Neuro:  sensory grossly intact and motor grossly intact  Psychiatric:  oriented to time, self, and place  Nutritional:  no abnormal weight gain or loss    ASSESSMENT/PLAN:      1. Diabetes Mellitus Type 2, controlled  -controlled, HgA1c 7.0% -->slight improvement   -Discussed importance of glycemic control to prevent complications of diabetes  -Discussed complications of diabetes include retinopathy, neuropathy, nephropathy and cardiovascular disease  -Discussed importance of SBGM  -Discussed importance of low CHO diet  -continue with Lantus 50 units subcutaneous daily   -Increase Mounjaro to 10mg subcutaneous weekly, verbalized understanding of risks and benefits   -Continue Jardiance 25mg PO daily  -No Metformin therapy given significant GI  symptoms  -Normotensive  -UTD with optho  -LDL improved, Continue Rosuvastatin 5mg PO daily, verbalized understanding of risks and benefits   -Normal lipids   -Normal foot exam performed 6/2024     RTC 4 months    Giuliana Alvares  10/25/2024

## 2025-02-28 ENCOUNTER — OFFICE VISIT (OUTPATIENT)
Dept: ENDOCRINOLOGY CLINIC | Facility: CLINIC | Age: 50
End: 2025-02-28

## 2025-02-28 VITALS
SYSTOLIC BLOOD PRESSURE: 139 MMHG | DIASTOLIC BLOOD PRESSURE: 88 MMHG | WEIGHT: 223 LBS | BODY MASS INDEX: 38 KG/M2 | HEART RATE: 98 BPM

## 2025-02-28 DIAGNOSIS — E11.9 CONTROLLED TYPE 2 DIABETES MELLITUS WITHOUT COMPLICATION, WITH LONG-TERM CURRENT USE OF INSULIN (HCC): Primary | ICD-10-CM

## 2025-02-28 DIAGNOSIS — Z79.4 CONTROLLED TYPE 2 DIABETES MELLITUS WITHOUT COMPLICATION, WITH LONG-TERM CURRENT USE OF INSULIN (HCC): Primary | ICD-10-CM

## 2025-02-28 LAB
GLUCOSE BLOOD: 218
HEMOGLOBIN A1C: 7.2 % (ref 4.3–5.6)
TEST STRIP LOT #: NORMAL NUMERIC

## 2025-02-28 RX ORDER — INSULIN GLARGINE 100 [IU]/ML
50 INJECTION, SOLUTION SUBCUTANEOUS NIGHTLY
Qty: 30 ML | Refills: 1 | Status: SHIPPED | OUTPATIENT
Start: 2025-02-28

## 2025-02-28 RX ORDER — TIRZEPATIDE 12.5 MG/.5ML
12.5 INJECTION, SOLUTION SUBCUTANEOUS WEEKLY
Qty: 6 ML | Refills: 1 | Status: SHIPPED | OUTPATIENT
Start: 2025-02-28

## 2025-02-28 NOTE — PROGRESS NOTES
Name: Lesley Tracy  Date: 2025      HISTORY OF PRESENT ILLNESS   Lesley Tracy is a 49 year old female who presents for diabetes mellitus, diagnosed in .     She is currently in the process of applying for [facility].         Prior HbA, C or glycohemoglobin were 9.6% 2014; 5.9% 10/2014; 8.1% 2015; 8.8% 2015; 11.1% 2015; 8.4% 2016; 6.8% 2016; 8.7% 2016; 11.0% 3/2018; 10.2% 2018; 10.3% 2018; 7.8% 2018; 10.4% 10/2019; 9.3% 2020; 7.5% 2020; 8.4% 2020; 7.3% 3/2021; 8.3% 2022; 8.9% 2022; 7.2% 2023; 6.2% 3/17/2023; 8.2% 2023; 6.9% 2023; 7.0% 2024; 7.2% 2024; 7.0% 10/2024; 7.2% POC Today     Dietary compliance: Fair, tries to follow a low carb diet     Exercise: No  Polyuria/polydipsia: No  Blurred vision: No    Episodes of hypoglycemia: no  Blood Glucose:  Checking 1-2 times per day  Fastin-220    Medications for DM   Lantus 50 units SQ daily  Jardiance 25mg PO daily   Mounjaro 10mg subcutaneous weekly     Victoza was still too expensive   Tresiba stopped due to back pain   toujeo -lack of glycemic control     -->Intolerant of Metformin     REVIEW OF SYSTEMS  Eyes: Diabetic retinopathy present: No            Most recent visit to eye doctor in last 12 months: Yes    CV: Cardiovascular disease present: No         Hypertension present: Yes         Hyperlipidemia present: Yes         Peripheral Vascular Disease present: No    : Nephropathy present: No    Neuro: Neuropathy present: No    Skin: Infection or ulceration: No    Osteoporosis: No    Thyroid disease: Yes, she has history of multinodular goiter s/p US guided FNA several years ago.  Most recent US demonstrated stable size of thyroid nodules.        Medications:     Current Outpatient Medications:     empagliflozin (JARDIANCE) 25 MG Oral Tab, Take 1 tablet (25 mg total) by mouth daily., Disp: 90 tablet, Rfl: 1    Tirzepatide (MOUNJARO) 10 MG/0.5ML Subcutaneous Solution Auto-injector, Inject 10  mg into the skin once a week., Disp: 6 mL, Rfl: 1    Insulin Pen Needle (PEN NEEDLES) 31G X 6 MM Does not apply Misc, Inject daily, Disp: 100 each, Rfl: 1    rosuvastatin 20 MG Oral Tab, Take 1 tablet (20 mg total) by mouth nightly., Disp: 90 tablet, Rfl: 1    losartan 25 MG Oral Tab, Take 1 tablet (25 mg total) by mouth daily., Disp: 90 tablet, Rfl: 1    insulin glargine (LANTUS SOLOSTAR) 100 UNIT/ML Subcutaneous Solution Pen-injector, Inject 50 Units into the skin nightly., Disp: 30 mL, Rfl: 1    pantoprazole 20 MG Oral Tab EC, Take 1 tablet (20 mg total) by mouth before breakfast., Disp: 90 tablet, Rfl: 2    Microlet Lancets Does not apply Misc, Check 2 times per day, Disp: 200 each, Rfl: 1    Insulin Pen Needle (BD PEN NEEDLE ESTEFANY U/F) 32G X 4 MM Does not apply Misc, Inject 2 times daily, Disp: 100 each, Rfl: 3    Insulin Pen Needle (RELION PEN NEEDLES) 32G X 4 MM Does not apply Misc, Use with injections 2 times daily, Disp: 200 each, Rfl: 3    Lancets 30G Does not apply Misc, Check 2 times daily, lancets for contour, Disp: 100 each, Rfl: 6    Insulin Syringe 30G X 5/16\" 1 ML Does not apply Misc, Use with insulin 4 times per day, Disp: 150 each, Rfl: 0    Tirzepatide (MOUNJARO) 7.5 MG/0.5ML Subcutaneous Solution Pen-injector, Inject 7.5 mg into the skin once a week. (Patient not taking: Reported on 2/28/2025), Disp: 6 mL, Rfl: 1     Allergies:   Allergies   Allergen Reactions    Metformin NAUSEA AND VOMITING and DIARRHEA    Morphine ITCHING, RESTLESSNESS and Tightness in Chest    Insulin Degludec OTHER (SEE COMMENTS)     Back pain       Social History:   Social History     Socioeconomic History    Marital status:    Tobacco Use    Smoking status: Never    Smokeless tobacco: Never   Vaping Use    Vaping status: Never Used   Substance and Sexual Activity    Alcohol use: No    Drug use: No   Other Topics Concern    Caffeine Concern Yes     Comment: tea; 2 cups a week.    Left Handed Yes    Right Handed No     Currently spends a great deal of time in the sun No    History of tanning No    Hx of Spending Great Deal of Time in Sun No    Bad sunburns in the past No    Tanning Salons in the Past No    Hx of Radiation Treatments No       Medical History:   Past Medical History:    Diabetes mellitus (HCC) no BDR     Diabetic retinopathy (HCC)    High blood pressure    Myopia of both eyes with astigmatism    OU    Obesity (BMI 30-39.9)    Other and unspecified hyperlipidemia    Sleep apnea    Stress    Type II or unspecified type diabetes mellitus without mention of complication, not stated as uncontrolled       Surgical history:   Past Surgical History:   Procedure Laterality Date    Colonoscopy N/A 11/3/2022    Procedure: COLONOSCOPY;  Surgeon: Loretta Jovel MD;  Location: [facility] ENDOSCOPY    Upper gi endoscopy,exam           PHYSICAL EXAM  /88   Pulse 98   Wt 223 lb (101.2 kg)   LMP 08/31/2024 (Approximate)   BMI 38.25 kg/m²     General Appearance:  alert, well developed, in no acute distress  Eyes:  normal conjunctivae, sclera., normal sclera and normal pupils  Ears/Nose/Mouth/Throat/Neck:  no palpable thyroid nodules or cervical lymphadenopathy, diffusely enlarged thyroid  Back: no kyphosis or back tenderness  Musculoskeletal:  normal muscle strength and tone  Skin:  normal moisture and skin texture  Neuro:  sensory grossly intact and motor grossly intact  Psychiatric:  oriented to time, self, and place  Nutritional:  no abnormal weight gain or loss    ASSESSMENT/PLAN:      1. Diabetes Mellitus Type 2, controlled  -controlled, HgA1c 7.2% -->stable   -Discussed importance of glycemic control to prevent complications of diabetes  -Discussed complications of diabetes include retinopathy, neuropathy, nephropathy and cardiovascular disease  -Discussed importance of SBGM  -Discussed importance of low CHO diet  -continue with Lantus 50 units subcutaneous daily   -Increase Mounjaro to 12.5mg subcutaneous weekly, verbalized  understanding of risks and benefits   -Continue Jardiance 25mg PO daily  -No Metformin therapy given significant GI symptoms  -Normotensive  -UTD with optho  -LDL improved, Continue Rosuvastatin 5mg PO daily, verbalized understanding of risks and benefits   -Normal lipids -->recheck labs before next visit   -Normal foot exam performed 6/2024     RTC 4 months    Giuliana Alvares  2/28/2025

## 2025-03-05 NOTE — TELEPHONE ENCOUNTER
Endocrine Refill protocol for oral and injectable diabetic medications    Protocol Criteria:  PASSED      If all below requirements are met, send a 90-day supply with 1 refill per provider protocol.    Verify appointment with Endocrinology completed in the last 6 months or scheduled in the next 3 months.  Verify A1C has been completed within the last 6 months and is below 8.5%     Last completed office visit: 2/28/2025 Giuliana Alvares MD   Next scheduled Follow up:   Future Appointments   Date Time Provider Department Center   6/30/2025  3:45 PM Giuliana Alvares MD [facility]WMOENDO [facility]      Last A1c result: Last A1c value was 7.2% done 2/28/2025.

## 2025-07-22 RX ORDER — INSULIN GLARGINE 100 [IU]/ML
50 INJECTION, SOLUTION SUBCUTANEOUS NIGHTLY
Qty: 15 ML | Refills: 0 | Status: SHIPPED | OUTPATIENT
Start: 2025-07-22

## 2025-07-22 NOTE — TELEPHONE ENCOUNTER
Endocrine refill protocol for basal insulins     Protocol Criteria: PASSED Reason: N/A    If all below requirements are met, send a 90-day supply with 1 refill per provider protocol.       Verify Appointment with Endocrinology completed in the last 6 months or scheduled in the next 3 months.  Verify A1C has been completed within the last 6 months and is below 8.5%     Last completed office visit:2/28/2025 Giuliana Alvares MD   Last completed telemed visit: Visit date not found  Next scheduled Follow up:   Future Appointments   Date Time Provider Department Center   9/12/2025  3:00 PM Gladis Zacarias MD [facility]N [facility]   9/13/2025 10:00 AM [facility]élic[facility] Hernadez [facility]   9/27/2025  8:30 AM Giuliana Alvares MD [facility]OMARI[facility]      Last A1c result: Last A1c value was 7.2% done 2/28/2025.

## 2025-07-24 ENCOUNTER — LAB ENCOUNTER (OUTPATIENT)
Dept: LAB | Facility: HOSPITAL | Age: 50
End: 2025-07-24
Attending: INTERNAL MEDICINE
Payer: COMMERCIAL

## 2025-07-24 DIAGNOSIS — Z79.4 CONTROLLED TYPE 2 DIABETES MELLITUS WITHOUT COMPLICATION, WITH LONG-TERM CURRENT USE OF INSULIN (HCC): ICD-10-CM

## 2025-07-24 DIAGNOSIS — E11.9 CONTROLLED TYPE 2 DIABETES MELLITUS WITHOUT COMPLICATION, WITH LONG-TERM CURRENT USE OF INSULIN (HCC): ICD-10-CM

## 2025-07-24 LAB
ALBUMIN SERPL-MCNC: 4.9 G/DL (ref 3.2–4.8)
ALBUMIN/GLOB SERPL: 1.7 {RATIO} (ref 1–2)
ALP LIVER SERPL-CCNC: 68 U/L (ref 39–100)
ALT SERPL-CCNC: 16 U/L (ref 10–49)
ANION GAP SERPL CALC-SCNC: 7 MMOL/L (ref 0–18)
AST SERPL-CCNC: 16 U/L (ref ?–34)
BASOPHILS # BLD AUTO: 0.04 X10(3) UL (ref 0–0.2)
BASOPHILS NFR BLD AUTO: 0.6 %
BILIRUB SERPL-MCNC: 0.7 MG/DL (ref 0.3–1.2)
BUN BLD-MCNC: 13 MG/DL (ref 9–23)
BUN/CREAT SERPL: 14.9 (ref 10–20)
CALCIUM BLD-MCNC: 9.2 MG/DL (ref 8.7–10.4)
CHLORIDE SERPL-SCNC: 108 MMOL/L (ref 98–112)
CHOLEST SERPL-MCNC: 126 MG/DL (ref ?–200)
CO2 SERPL-SCNC: 25 MMOL/L (ref 21–32)
CREAT BLD-MCNC: 0.87 MG/DL (ref 0.55–1.02)
CREAT UR-SCNC: 136.4 MG/DL
DEPRECATED RDW RBC AUTO: 39.8 FL (ref 35.1–46.3)
EGFRCR SERPLBLD CKD-EPI 2021: 82 ML/MIN/1.73M2 (ref 60–?)
EOSINOPHIL # BLD AUTO: 0.16 X10(3) UL (ref 0–0.7)
EOSINOPHIL NFR BLD AUTO: 2.6 %
ERYTHROCYTE [DISTWIDTH] IN BLOOD BY AUTOMATED COUNT: 13.9 % (ref 11–15)
FASTING PATIENT LIPID ANSWER: YES
FASTING STATUS PATIENT QL REPORTED: YES
GLOBULIN PLAS-MCNC: 2.9 G/DL (ref 2–3.5)
GLUCOSE BLD-MCNC: 79 MG/DL (ref 70–99)
HCT VFR BLD AUTO: 42.8 % (ref 35–48)
HDLC SERPL-MCNC: 51 MG/DL (ref 40–59)
HGB BLD-MCNC: 14.4 G/DL (ref 12–16)
IMM GRANULOCYTES # BLD AUTO: 0.01 X10(3) UL (ref 0–1)
IMM GRANULOCYTES NFR BLD: 0.2 %
LDLC SERPL CALC-MCNC: 64 MG/DL (ref ?–100)
LYMPHOCYTES # BLD AUTO: 2.65 X10(3) UL (ref 1–4)
LYMPHOCYTES NFR BLD AUTO: 42.5 %
MCH RBC QN AUTO: 26.8 PG (ref 26–34)
MCHC RBC AUTO-ENTMCNC: 33.6 G/DL (ref 31–37)
MCV RBC AUTO: 79.6 FL (ref 80–100)
MICROALBUMIN UR-MCNC: 0.8 MG/DL
MICROALBUMIN/CREAT 24H UR-RTO: 5.9 UG/MG (ref ?–30)
MONOCYTES # BLD AUTO: 0.51 X10(3) UL (ref 0.1–1)
MONOCYTES NFR BLD AUTO: 8.2 %
NEUTROPHILS # BLD AUTO: 2.87 X10 (3) UL (ref 1.5–7.7)
NEUTROPHILS # BLD AUTO: 2.87 X10(3) UL (ref 1.5–7.7)
NEUTROPHILS NFR BLD AUTO: 45.9 %
NONHDLC SERPL-MCNC: 75 MG/DL (ref ?–130)
OSMOLALITY SERPL CALC.SUM OF ELEC: 289 MOSM/KG (ref 275–295)
PLATELET # BLD AUTO: 357 10(3)UL (ref 150–450)
POTASSIUM SERPL-SCNC: 3.5 MMOL/L (ref 3.5–5.1)
PROT SERPL-MCNC: 7.8 G/DL (ref 5.7–8.2)
RBC # BLD AUTO: 5.38 X10(6)UL (ref 3.8–5.3)
SODIUM SERPL-SCNC: 140 MMOL/L (ref 136–145)
TRIGL SERPL-MCNC: 48 MG/DL (ref 30–149)
TSI SER-ACNC: 0.47 UIU/ML (ref 0.55–4.78)
VLDLC SERPL CALC-MCNC: 7 MG/DL (ref 0–30)
WBC # BLD AUTO: 6.2 X10(3) UL (ref 4–11)

## 2025-07-24 PROCEDURE — 84443 ASSAY THYROID STIM HORMONE: CPT

## 2025-07-24 PROCEDURE — 82570 ASSAY OF URINE CREATININE: CPT

## 2025-07-24 PROCEDURE — 36415 COLL VENOUS BLD VENIPUNCTURE: CPT

## 2025-07-24 PROCEDURE — 80053 COMPREHEN METABOLIC PANEL: CPT

## 2025-07-24 PROCEDURE — 80061 LIPID PANEL: CPT

## 2025-07-24 PROCEDURE — 85025 COMPLETE CBC W/AUTO DIFF WBC: CPT

## 2025-07-24 PROCEDURE — 82043 UR ALBUMIN QUANTITATIVE: CPT

## 2025-08-11 ENCOUNTER — LAB ENCOUNTER (OUTPATIENT)
Dept: LAB | Facility: HOSPITAL | Age: 50
End: 2025-08-11
Attending: NURSE PRACTITIONER

## 2025-08-11 DIAGNOSIS — Z79.4 TYPE 2 DIABETES MELLITUS WITH HYPERGLYCEMIA, WITH LONG-TERM CURRENT USE OF INSULIN (HCC): ICD-10-CM

## 2025-08-11 DIAGNOSIS — Z79.4 CONTROLLED TYPE 2 DIABETES MELLITUS WITHOUT COMPLICATION, WITH LONG-TERM CURRENT USE OF INSULIN (HCC): ICD-10-CM

## 2025-08-11 DIAGNOSIS — E11.9 CONTROLLED TYPE 2 DIABETES MELLITUS WITHOUT COMPLICATION, WITH LONG-TERM CURRENT USE OF INSULIN (HCC): ICD-10-CM

## 2025-08-11 DIAGNOSIS — E11.65 TYPE 2 DIABETES MELLITUS WITH HYPERGLYCEMIA, WITH LONG-TERM CURRENT USE OF INSULIN (HCC): ICD-10-CM

## 2025-08-11 LAB
EST. AVERAGE GLUCOSE BLD GHB EST-MCNC: 140 MG/DL (ref 68–126)
HBA1C MFR BLD: 6.5 % (ref ?–5.7)

## 2025-08-11 PROCEDURE — 36415 COLL VENOUS BLD VENIPUNCTURE: CPT

## 2025-08-11 PROCEDURE — 83036 HEMOGLOBIN GLYCOSYLATED A1C: CPT

## 2025-08-26 DIAGNOSIS — E11.9 CONTROLLED TYPE 2 DIABETES MELLITUS WITHOUT COMPLICATION, WITH LONG-TERM CURRENT USE OF INSULIN (HCC): Primary | ICD-10-CM

## 2025-08-26 DIAGNOSIS — Z79.4 CONTROLLED TYPE 2 DIABETES MELLITUS WITHOUT COMPLICATION, WITH LONG-TERM CURRENT USE OF INSULIN (HCC): Primary | ICD-10-CM

## 2025-08-26 RX ORDER — EMPAGLIFLOZIN 25 MG/1
25 TABLET, FILM COATED ORAL DAILY
Qty: 90 TABLET | Refills: 1 | Status: SHIPPED | OUTPATIENT
Start: 2025-08-26

## (undated) DEVICE — KIT ENDO ORCAPOD 160/180/190

## (undated) DEVICE — KIT CLEAN ENDOKIT 1.1OZ GOWNX2

## (undated) DEVICE — MEDI-VAC NON-CONDUCTIVE SUCTION TUBING 6MM X 1.8M (6FT.) L: Brand: CARDINAL HEALTH

## (undated) DEVICE — SNARE OPTMZ PLPCTM TRP

## (undated) DEVICE — SNARE ENDOSCOPIC 10MM ROUND

## (undated) DEVICE — 35 ML SYRINGE REGULAR TIP: Brand: MONOJECT

## (undated) DEVICE — LINE MNTR ADLT SET O2 INTMD

## (undated) DEVICE — 6 ML SYRINGE LUER-LOCK TIP: Brand: MONOJECT

## (undated) DEVICE — 3 ML SYRINGE LUER-LOCK TIP: Brand: MONOJECT

## (undated) NOTE — LETTER
[address] [address] [address] [place] [address]  [phone]  Authorization for Imaging Procedure  Date of Procedure:     1. I hereby authorize  _____________________, my physician and his/her assistants (if applicable), which may include medical students, residents, and/or fellows, to perform the following procedure and administer such anesthesia as may be determined necessary by my physician: ULTRASOUND GUIDED ASPIRATION BIOPSY BILATERAL THYROID [id number]) on Paschal Severs. 2.  I recognize that during the procedure, unforeseen conditions may necessitate additional or different procedures than those listed above. I, therefore, further authorize and request that the above-named physician, assistants, or designees perform such procedures as are, in their judgment, necessary and desirable. 3.  My physician has discussed prior to my procedure the potential benefits, risks and side effects of this procedure; the likelihood of achieving goals; and potential problems that might occur during recuperation. They also discussed reasonable alternatives to the procedure, including risks, benefits, and side effects related to the alternatives and risks related to not receiving this procedure. I have had all my questions answered and I acknowledge that no guarantee has been made as to the result that may be obtained. 4.  Should the need arise during my procedure, which includes change of level of care prior to discharge, I also consent to the administration of blood and/or blood products. Further, I understand that despite careful testing and screening of blood or blood products by collecting agencies, I may still be subject to ill effects as a result of receiving a blood transfusion and/or blood products.  The following are some, but not all, of the potential risks that can occur: fever and allergic reactions, hemolytic reactions, transmission of diseases such as Hepatitis, AIDS and Cytomegalovirus (CMV) and fluid overload. In the event that I wish to have an autologous transfusion of my own blood, or a directed donor transfusion, I will discuss this with my physician. Check only if Refusing Blood or Blood Products  I understand refusal of blood or blood products as deemed necessary by my physician may have serious consequences to my condition to include possible death. I hereby assume responsibility for my refusal and release the hospital, its personnel, and my physicians from any responsibility for the consequences of my refusal.   [  ] Patient Refuses Blood      5. I authorize the use of any specimen, organs, tissues, body parts or foreign objects that may be removed from my body during the procedure for diagnosis, research or teaching purposes and their subsequent disposal by hospital authorities. I also authorize the release of specimen test results and/or written reports to my treating physician on the hospital medical staff or other referring or consulting physicians involved in my care, at the discretion of the Pathologist or my treating physician. 6.  I consent to the photographing or videotaping of the procedures to be performed, including appropriate portions of my body for medical, scientific, or educational purposes, provided my identity is not revealed by the pictures or by descriptive texts accompanying them. If the procedure has been photographed/videotaped, the physician will obtain the original picture, image, videotape or CD. The hospital will not be responsible for storage, release or maintenance of the picture, image, tape or CD.   7.  I consent to the presence of a  or observers in the operating room as deemed necessary by my physician or their designees. 8.  I recognize that in the event my procedure results in extended X-Ray/fluoroscopy time, I may develop a skin reaction. 9.   If I have a Do Not Attempt Resuscitation (DNAR) order in place, that status will be suspended while in the operating room, procedural suite, and during the recovery period unless otherwise explicitly stated by me (or a person authorized to consent on my behalf). The performing physician or my attending physician will determine when the applicable recovery period ends for purposes of reinstating the DNAR order. 10.  I acknowledge that my physician has explained sedation/analgesia administration to me including the risk and benefits I consent to the administration of sedation/analgesia as may be necessary or desirable in the judgment of my physician. I CERTIFY THAT I HAVE READ AND FULLY UNDERSTAND THE ABOVE CONSENT FOR THE PROCEDURE.    Signature of Patient: _____________________________________________________________  Responsible person in case of minor, unconscious: ____________________________________  Relationship to patient:  __________________________________________________________    Signature of Witness: _______________________________Date: _________Time: __________    Meli Both OF PROVIDER: ____________________________________    Patient Name: Bettye Bassett : 1975  Printed: 2022   Medical Record #: [id number]

## (undated) NOTE — LETTER
20      Patient: Amna Dominguez  : 1975 Visit date: 3/9/2020    Dear Elodia Fields,      I examined your patient in consultation today. She has left hand numbness which is probably carpal tunnel syndrome.   She has a history of ri

## (undated) NOTE — LETTER
June 20, 2018    Jaron Santo, [facility]     Patient: Pedro Anguiano   YOB: 1975   Date of Visit: 6/20/2018       Dear Dr. Oriana Johns MD:    Thank you for referring Laura Champagne to me for evaluation.  Here Insulin Degludec (TRESIBA FLEXTOUCH) 200 UNIT/ML Subcutaneous Solution Pen-injector Inject 80 Units into the skin daily. Disp: 30 mL Rfl: 4   Liraglutide (VICTOZA) 18 MG/3ML Subcutaneous Solution Pen-injector Inject 1.8 mg into the skin daily.  Disp: 9 mL R Right Left    Disc Good rim Good rim    C/D Ratio 0.45 0.45    Macula Normal-no BDR 1 CWS above fovea    Vessels Normal Normal    Periphery Normal Normal            Refraction     Wearing Rx       Sphere Cylinder Axis    Right -2.25 +1.25 090    Left -1

## (undated) NOTE — LETTER
6/8/2018          To Whom It May Concern:    Isabelle Cheyenne     COLOR VISION TEST/ SCREEN DONE ONLINE IN OFFICE WAS NORMAL ON 05/24/2018. If you require additional information please contact our office.         Sincerely,    Michelle Bender MD

## (undated) NOTE — MR AVS SNAPSHOT
[facility] SPECIALTY [facility] [place] [address] [address] [address] [phone]               Thank you for choosing us for your health care visit with Gennette Jeans, PA.   We are glad to serve you and happy to provide you with this summary of Use with injections 2 times daily   Commonly known as:  RELION PEN NEEDLES           * Insulin Syringe 31G X 5/16\" 0.5 ML Misc   by Does not apply route.            * Insulin Syringe 30G X 5/16\" 1 ML Misc   Use with insulin 4 times per day           Sakshi Higuera NATE, Direct Screen [E]    Complete by:  Feb 10, 2017 (Approximate)    Assoc Dx:  Swelling of both hands [M79.89]           C-Reactive Protein [E]    Complete by:  Feb 10, 2017 (Approximate)    Assoc Dx:  Swelling of both hands [M79.89]           [facility] [address]  [address]  [address] [place] [address]    [address]  [address] [place], [place]    It is the patient's responsibility to check with and follow their insurance Water is best for hydration Fast food. Eat at home when possible     Tips for increasing your physical activity – Adults who are physically active are less likely to develop some chronic diseases than adults who are inactive.      HOW TO GET STARTED: HOW

## (undated) NOTE — Clinical Note
Hello, I am referring patient back to you for diabetes management- referral is in place from Dr. Lizzy Fleming. Patient is really struggling with weight.  She is unable to afford Victoza or tolerate tresiba in the past. It might not be covered, but do you think j

## (undated) NOTE — LETTER
[address]  Authorization for Invasive Procedure                                                                                           1. I hereby authorize Nichole Metcalf MD, my physician and his/her assistants (if applicable), which may include medical students, residents, and/or fellows, to perform the following surgical operation/ procedure and administer such anesthesia as may be determined necessary by my physician: Operation/Procedure name (s) COLONOSCOPY on Ethan Wilson   2. I recognize that during the surgical operation/procedure, unforeseen conditions may necessitate additional or different procedures than those listed above. I, therefore, further authorize and request that the above-named surgeon, assistants, or designees perform such procedures as are, in their judgment, necessary and desirable. 3.   My surgeon/physician has discussed prior to my surgery the potential benefits, risks and side effects of this procedure; the likelihood of achieving goals; and potential problems that might occur during recuperation. They also discussed reasonable alternatives to the procedure, including risks, benefits, and side effects related to the alternatives and risks related to not receiving this procedure. I have had all my questions answered and I acknowledge that no guarantee has been made as to the result that may be obtained. 4.   Should the need arise during my operation/procedure, which includes change of level of care prior to discharge, I also consent to the administration of blood and/or blood products. Further, I understand that despite careful testing and screening of blood or blood products by collecting agencies, I may still be subject to ill effects as a result of receiving a blood transfusion and/or blood products.   The following are some, but not all, of the potential risks that can occur: fever and allergic reactions, hemolytic reactions, transmission of diseases such as Hepatitis, AIDS and Cytomegalovirus (CMV) and fluid overload. In the event that I wish to have an autologous transfusion of my own blood, or a directed donor transfusion, I will discuss this with my physician. Check only if Refusing Blood or Blood Products  I understand refusal of blood or blood products as deemed necessary by my physician may have serious consequences to my condition to include possible death. I hereby assume responsibility for my refusal and release the hospital, its personnel, and my physicians from any responsibility for the consequences of my refusal.           ____ Refuse      5. I authorize the use of any specimen, organs, tissues, body parts or foreign objects that may be removed from my body during the operation/procedure for diagnosis, research or teaching purposes and their subsequent disposal by hospital authorities. I also authorize the release of specimen test results and/or written reports to my treating physician on the hospital medical staff or other referring or consulting physicians involved in my care, at the discretion of the Pathologist or my treating physician. 6.   I consent to the photographing or videotaping of the operations or procedures to be performed, including appropriate portions of my body for medical, scientific, or educational purposes, provided my identity is not revealed by the pictures or by descriptive texts accompanying them. If the procedure has been photographed/videotaped, the surgeon will obtain the original picture, image, videotape or CD. The hospital will not be responsible for storage, release or maintenance of the picture, image, tape or CD.    7.   I consent to the presence of a  or observers in the operating room as deemed necessary by my physician or their designees.     8.   I recognize that in the event my procedure results in extended X-Ray/fluoroscopy time, I may develop a skin reaction. 9. If I have a Do Not Attempt Resuscitation (DNAR) order in place, that status will be suspended while in the operating room, procedural suite, and during the recovery period unless otherwise explicitly stated by me (or a person authorized to consent on my behalf). The surgeon or my attending physician will determine when the applicable recovery period ends for purposes of reinstating the DNAR order. 10. Patients having a sterilization procedure: I understand that if the procedure is successful the results will be permanent and it will therefore be impossible for me to inseminate, conceive, or bear children. I also understand that the procedure is intended to result in sterility, although the result has not been guaranteed. 11. I acknowledge that my physician has explained sedation/analgesia administration to me including the risk and benefits I consent to the administration of sedation/analgesia as may be necessary or desirable in the judgment of my physician. I CERTIFY THAT I HAVE READ AND FULLY UNDERSTAND THE ABOVE CONSENT TO OPERATION and/or OTHER PROCEDURE.     _________________________________________ _________________________________     ___________________________________  Signature of Patient     Signature of Responsible Person                   Printed Name of Responsible Person                              _________________________________________ ______________________________        ___________________________________  Signature of Witness         Date  Time         Relationship to Patient    STATEMENT OF PHYSICIAN My signature below affirms that prior to the time of the procedure; I have explained to the patient and/or his/her legal representative, the risks and benefits involved in the proposed treatment and any reasonable alternative to the proposed treatment.  I have also explained the risks and benefits involved in refusal of the proposed treatment and alternatives to the proposed treatment and have answered the patient's questions.  If I have a significant financial interest in a co-management agreement or a significant financial interest in any product or implant, or other significant relationship used in this procedure/surgery, I have disclosed this and had a discussion with my patient.     _______________________________________________________________ _____________________________  Sara Keys Physician)                                                                                         (Date)                                   (Time)  Patient Name: Kelsey Chatman    : 1975   Printed: 2022      Medical Record #: [id number]                                              Page 1 of 1

## (undated) NOTE — LETTER
10/27/2023          To Whom It May Concern:    Pawan Jacobson is currently under my medical care and may not return to work at this time. Please excuse Jesus Kari for 1 days. She may return to work on 10/31/23. If you require additional information please contact our office.         Sincerely,    Catalina Carrera,           Document generated by:  Catalina Carrera, DO

## (undated) NOTE — LETTER
07/12/19        [address] [facility] [place]      Dear Anders Rubio,    [facility] records indicate that you have outstanding lab work and or testing that was ordered for you and has not yet been completed:  Orders Placed This Encounter      He

## (undated) NOTE — LETTER
2/15/2023              [facility]  [facility],      I am writing to appeal the denial of coverage for my patient Tavon Churchill  79, member ID [id number] Reference # [id number], for Tirzepatide [facility]) 12.5 MG/0.5ML Subcutaneous Solution Pen-injector. My patient has type 2 diabetes mellitus. She has previously tried and failed Victoza and Ozempic and is intolerant of Metformin. Patient has been stable on Mounjaro, and has had a reduction in A1c level since starting.  Please consider my appeal request.                                                               MD EL[facility]O [facility], [address] [place] [address]  [address], [place] [id number] [phone]

## (undated) NOTE — ED AVS SNAPSHOT
Debi Walker   MRN: [id number]    Department:  [facility] Emergency Department   Date of Visit:  6/21/2018           Disclosure     Insurance plans vary and the physician(s) referred by the ER may not be covered by your plan.  Please contac CARE PHYSICIAN AT ONCE OR RETURN IMMEDIATELY TO THE EMERGENCY DEPARTMENT. If you have been prescribed any medication(s), please fill your prescription right away and begin taking the medication(s) as directed.   If you believe that any of the medications

## (undated) NOTE — LETTER
10/13/2017              [address]         Dear Opal Souza,    [address] records indicate that the tests ordered for you by Ming Armstrong MD  have not been done.   If you have, in fact, already completed the te

## (undated) NOTE — LETTER
AUTHORIZATION FOR SURGICAL OPERATION OR OTHER PROCEDURE    1. I hereby authorize Dr. Daisy Lin, and [address] [place] [address] staff assigned to my case to perform the following operation and/or procedure at the [address] [place] [address]:    Endometrial Biopsy      2. Responsible person                          []  Spouse  In case of minor or                    [] Other  _____________   Incompetent name:  __________________________________________________                               (please print)      _____________

## (undated) NOTE — LETTER
07/12/19        [address] [facility] [place]      Dear Vahid Eldridge,    [facility] records indicate that you have outstanding lab work and or testing that was ordered for you and has not yet been completed:  Orders Placed This Encounter      He